# Patient Record
Sex: FEMALE | Race: BLACK OR AFRICAN AMERICAN | Employment: FULL TIME | URBAN - METROPOLITAN AREA
[De-identification: names, ages, dates, MRNs, and addresses within clinical notes are randomized per-mention and may not be internally consistent; named-entity substitution may affect disease eponyms.]

---

## 2017-05-08 ENCOUNTER — ANESTHESIA (OUTPATIENT)
Dept: ENDOSCOPY | Age: 32
End: 2017-05-08
Payer: COMMERCIAL

## 2017-05-08 ENCOUNTER — HOSPITAL ENCOUNTER (OUTPATIENT)
Age: 32
Setting detail: OUTPATIENT SURGERY
Discharge: HOME | End: 2017-05-08
Attending: SURGERY | Admitting: SURGERY
Payer: COMMERCIAL

## 2017-05-08 ENCOUNTER — ANESTHESIA EVENT (OUTPATIENT)
Dept: ENDOSCOPY | Age: 32
End: 2017-05-08
Payer: COMMERCIAL

## 2017-05-08 VITALS
HEART RATE: 78 BPM | TEMPERATURE: 97 F | RESPIRATION RATE: 14 BRPM | WEIGHT: 193 LBS | SYSTOLIC BLOOD PRESSURE: 124 MMHG | DIASTOLIC BLOOD PRESSURE: 70 MMHG | BODY MASS INDEX: 30.29 KG/M2 | HEIGHT: 67 IN | OXYGEN SATURATION: 99 %

## 2017-05-08 PROCEDURE — 88305 TISSUE EXAM BY PATHOLOGIST: CPT | Performed by: SURGERY

## 2017-05-08 PROCEDURE — 74011250636 HC RX REV CODE- 250/636

## 2017-05-08 PROCEDURE — 76060000032 HC ANESTHESIA 0.5 TO 1 HR: Performed by: SURGERY

## 2017-05-08 PROCEDURE — 74011000250 HC RX REV CODE- 250

## 2017-05-08 PROCEDURE — 74011250636 HC RX REV CODE- 250/636: Performed by: ANESTHESIOLOGY

## 2017-05-08 PROCEDURE — 77030009426 HC FCPS BIOP ENDOSC BSC -B: Performed by: SURGERY

## 2017-05-08 PROCEDURE — 76040000026: Performed by: SURGERY

## 2017-05-08 RX ORDER — HYDROMORPHONE HYDROCHLORIDE 2 MG/ML
0.5 INJECTION, SOLUTION INTRAMUSCULAR; INTRAVENOUS; SUBCUTANEOUS
Status: CANCELLED | OUTPATIENT
Start: 2017-05-08

## 2017-05-08 RX ORDER — ESMOLOL HYDROCHLORIDE 10 MG/ML
INJECTION INTRAVENOUS AS NEEDED
Status: DISCONTINUED | OUTPATIENT
Start: 2017-05-08 | End: 2017-05-08 | Stop reason: HOSPADM

## 2017-05-08 RX ORDER — PROPOFOL 10 MG/ML
INJECTION, EMULSION INTRAVENOUS
Status: DISCONTINUED | OUTPATIENT
Start: 2017-05-08 | End: 2017-05-08 | Stop reason: HOSPADM

## 2017-05-08 RX ORDER — LIDOCAINE HYDROCHLORIDE 20 MG/ML
INJECTION, SOLUTION EPIDURAL; INFILTRATION; INTRACAUDAL; PERINEURAL AS NEEDED
Status: DISCONTINUED | OUTPATIENT
Start: 2017-05-08 | End: 2017-05-08 | Stop reason: HOSPADM

## 2017-05-08 RX ORDER — MIDAZOLAM HYDROCHLORIDE 1 MG/ML
2 INJECTION, SOLUTION INTRAMUSCULAR; INTRAVENOUS ONCE
Status: COMPLETED | OUTPATIENT
Start: 2017-05-08 | End: 2017-05-08

## 2017-05-08 RX ORDER — OXYCODONE HYDROCHLORIDE 5 MG/1
5 TABLET ORAL
Status: CANCELLED | OUTPATIENT
Start: 2017-05-08

## 2017-05-08 RX ORDER — SODIUM CHLORIDE, SODIUM LACTATE, POTASSIUM CHLORIDE, CALCIUM CHLORIDE 600; 310; 30; 20 MG/100ML; MG/100ML; MG/100ML; MG/100ML
75 INJECTION, SOLUTION INTRAVENOUS CONTINUOUS
Status: DISCONTINUED | OUTPATIENT
Start: 2017-05-08 | End: 2017-05-08 | Stop reason: HOSPADM

## 2017-05-08 RX ORDER — SODIUM CHLORIDE 0.9 % (FLUSH) 0.9 %
5-10 SYRINGE (ML) INJECTION AS NEEDED
Status: CANCELLED | OUTPATIENT
Start: 2017-05-08

## 2017-05-08 RX ORDER — PROPOFOL 10 MG/ML
INJECTION, EMULSION INTRAVENOUS AS NEEDED
Status: DISCONTINUED | OUTPATIENT
Start: 2017-05-08 | End: 2017-05-08 | Stop reason: HOSPADM

## 2017-05-08 RX ORDER — OXYCODONE AND ACETAMINOPHEN 10; 325 MG/1; MG/1
1 TABLET ORAL AS NEEDED
Status: CANCELLED | OUTPATIENT
Start: 2017-05-08

## 2017-05-08 RX ADMIN — PROPOFOL 160 MCG/KG/MIN: 10 INJECTION, EMULSION INTRAVENOUS at 12:29

## 2017-05-08 RX ADMIN — PROPOFOL 50 MG: 10 INJECTION, EMULSION INTRAVENOUS at 12:29

## 2017-05-08 RX ADMIN — SODIUM CHLORIDE, SODIUM LACTATE, POTASSIUM CHLORIDE, AND CALCIUM CHLORIDE 75 ML/HR: 600; 310; 30; 20 INJECTION, SOLUTION INTRAVENOUS at 12:00

## 2017-05-08 RX ADMIN — PROPOFOL 50 MG: 10 INJECTION, EMULSION INTRAVENOUS at 12:35

## 2017-05-08 RX ADMIN — ESMOLOL HYDROCHLORIDE 30 MG: 10 INJECTION INTRAVENOUS at 12:44

## 2017-05-08 RX ADMIN — PROPOFOL 50 MG: 10 INJECTION, EMULSION INTRAVENOUS at 12:42

## 2017-05-08 RX ADMIN — LIDOCAINE HYDROCHLORIDE 50 MG: 20 INJECTION, SOLUTION EPIDURAL; INFILTRATION; INTRACAUDAL; PERINEURAL at 12:24

## 2017-05-08 RX ADMIN — MIDAZOLAM HYDROCHLORIDE 2 MG: 1 INJECTION, SOLUTION INTRAMUSCULAR; INTRAVENOUS at 12:06

## 2017-05-08 NOTE — ROUTINE PROCESS
Patient discharged via wheelchair. VSS on room air. Spoke with Dr. Job Brittle at bedside. Anesthesia aware of discharge. Discharge instructions given to patient and family.

## 2017-05-08 NOTE — PROGRESS NOTES
Pt reports 9/10 abdominal and rectal pain. Pt reports abd pain as cramping and rectal pain as a rip. Dr Potts Done contacted and made aware.  No orders received

## 2017-05-08 NOTE — DISCHARGE INSTRUCTIONS
Gastrointestinal Colonoscopy/Flexible Sigmoidoscopy - Lower Exam Discharge Instructions  1. Call Dr. Adriel Hernandez for any problems or questions. 2. Contact the doctors office for follow up appointment as directed  3. Medication may cause drowsiness for several hours, therefore, do not drive or operate machinery for remainder of the day. 4. No alcohol today. 5. Ordinarily, you may resume regular diet and activity after exam unless otherwise specified by your physician. 6. Because of air put into your colon during exam, you may experience some abdominal distension, relieved by the passage of gas, for several hours. 7. Contact your physician if you have any of the following:  a. Excessive amount of bleeding - large amount when having a bowel movement. Occasional specks of blood with bowel movement would not be unusual.  b. Severe abdominal pain  c. Fever or Chills    Any additional instructions:    Awaiting pathology report      Instructions given to Tamara Karime and other family members.   Instructions given by:  Luis Naidu

## 2017-05-08 NOTE — ANESTHESIA PREPROCEDURE EVALUATION
Anesthetic History   No history of anesthetic complications            Review of Systems / Medical History  Patient summary reviewed and pertinent labs reviewed    Pulmonary  Within defined limits                 Neuro/Psych   Within defined limits           Cardiovascular    Hypertension        Dysrhythmias : SVT      Exercise tolerance[de-identified] Relates marginal 4 mets only  Comments: S/p SVT ablation   GI/Hepatic/Renal  Within defined limits              Endo/Other  Within defined limits           Other Findings              Physical Exam    Airway  Mallampati: II  TM Distance: > 6 cm    Mouth opening: Normal     Cardiovascular    Rhythm: regular           Dental    Dentition: Lower braces and Upper braces     Pulmonary                 Abdominal  GI exam deferred       Other Findings            Anesthetic Plan    ASA: 2  Anesthesia type: total IV anesthesia          Induction: Intravenous  Anesthetic plan and risks discussed with: Patient

## 2017-05-08 NOTE — H&P (VIEW-ONLY)
Brionna Gonzalez MD, [facility], [address] [place] [address]  Cait Coats  Phone [phone]   Fax [phone]      Date of visit: 5/5/2017     Primary/Requesting provider: Xin Mc DO    Chief Complaint   Patient presents with    Rectal Bleeding          HISTORY OF PRESENT ILLNESS  Bridget Nielsen is a 27 y.o. female. HPI  Patient is a 27 y.o. female who presents for consideration of colonoscopy. She reports 1 month of symptoms including BRBPR, with antecedent lower abdominal/pelvic cramping which is not alleviated by the BM, typically lasting a few hours. She also noted \"ripping\" pain at the anus with bowel movements. She is not having diarrhea. Over this same time she has had significant anorexia, although symptoms are not directly exacerbated by eating, and has lost 17 pounds. She has been treated with antibiotics x 2 for presumed UTI due to symptom of urinary frequency; this seems to be controlled. She has not had fever, chills, nausea, vomiting. She has a strong family history of IBD. She reports rectal exam by PCP was (-), thus the concern about IBD. Medications:   Current Outpatient Prescriptions   Medication Sig    cyclobenzaprine (FLEXERIL) 10 mg tablet Take 1 Tab by mouth nightly. (Patient taking differently: Take 10 mg by mouth nightly as needed.)    losartan-hydroCHLOROthiazide (HYZAAR) 50-12.5 mg per tablet Take 1 Tab by mouth daily.  acetaminophen (TYLENOL EXTRA STRENGTH) 500 mg tablet Take 1,000 mg by mouth every eight (8) hours as needed for Pain.  ondansetron (ZOFRAN ODT) 4 mg disintegrating tablet Take 4 mg by mouth. No current facility-administered medications for this visit.          Allergies: No Known Allergies     Past History:  Past Medical History:   Diagnosis Date    Anemia     Chronic pain     back pain-- prn meds    Hypertension     manged with meds    Migraines     Palpitations     SV[facility]ra[facility] ([facility])      [facility] / ablation 2016      Past Surgical History:   Procedure Laterality Date    HX  SECTION      x 2    HX HERNIA REPAIR  10/2016    HX HYSTERECTOMY  10/2016    HX SVT ABLATION          Family and Social History:  Family History   Problem Relation Age of Onset    Ulcerative Colitis Mother      also Castleman's disease    No Known Problems Father     Crohn's Disease Sister      pt reports Crohn's and UC (?)    Crohn's Disease Maternal Grandmother     Colon Cancer Maternal Grandfather      Social History     Social History    Marital status:      Spouse name: N/A    Number of children: N/A    Years of education: N/A     Occupational History    Not on file. Social History Main Topics    Smoking status: Never Smoker    Smokeless tobacco: Never Used    Alcohol use Yes      Comment: Occ    Drug use: No    Sexual activity: Yes     Partners: Male     Birth control/ protection: None     Other Topics Concern    Not on file     Social History Narrative       Review of Systems   Constitutional: Positive for malaise/fatigue and weight loss. HENT: Negative. Eyes: Negative. Respiratory: Negative. Negative for cough, hemoptysis, sputum production and shortness of breath. Cardiovascular: Negative. Negative for chest pain, palpitations, orthopnea, claudication, leg swelling and PND. Gastrointestinal: Positive for abdominal pain, blood in stool, constipation, diarrhea, heartburn, nausea and vomiting. Genitourinary: Negative. Musculoskeletal: Negative. Skin: Negative. Neurological: Negative. Negative for headaches. Endo/Heme/Allergies: Negative. Psychiatric/Behavioral: Negative. Negative for depression, hallucinations, substance abuse and suicidal ideas. The patient is not nervous/anxious.         Physical Exam  Visit Vitals    /82    Pulse 81    Ht 5' 7\" (1.702 m)    Wt 193 lb (87.5 kg)    LMP 2013    BMI 30.23 kg/m2 General Appearance: In no acute distress   Ears/Nose/Mouth/Throat:   Hearing grossly normal.         Neck: Supple. Chest:   Lungs clear to auscultation bilaterally. Cardiovascular:  Regular rate and rhythm, S1, S2 normal, no murmur. Abdomen:   Soft. Rectal-negative per PCP; repeat deferred to indicated colonoscopy   Extremities: No gross deformity    Neuro: alert and oriented x 3              ASSESSMENT and PLAN  Encounter Diagnoses   Name Primary?  Rectal bleed Yes    Family history of Crohn's disease      Given symptoms and family history, and prior (-) anoscopy by PCP, colonoscopy is warranted. She is very concerned about a diagnosis of IBD. Will proceed expeditiously with Colonoscopy. Procedure and preparation reviewed. Risks reviewed including sedation risks, bleeding, perforation, incomplete exam, and missed pathology. Take prep the day prior to the procedure, as reviewed by the nurse. Call if any questions regarding the prep.

## 2017-05-08 NOTE — IP AVS SNAPSHOT
Ramses Quinn 
 
 
 [address] 
[phone] Patient: Nolvia Hamilton MRN: [id number] :1985 You are allergic to the following No active allergies Recent Documentation Height Weight Breastfeeding? BMI OB Status Smoking Status 1.702 m 87.5 kg No 30.23 kg/m2 Hysterectomy Never Smoker Emergency Contacts Name Discharge Info Relation Home Work Mobile Ariel Song DISCHARGE CAREGIVER [3] Spouse [3] [phone] CodyDeborah DISCHARGE CAREGIVER [3] Mother [14] [phone] About your hospitalization You were admitted on: May 8, 2017 You last received care in the:  [facility] You were discharged on: May 8, 2017 Unit phone number:  [phone] Why you were hospitalized Your primary diagnosis was:  Not on File Providers Seen During Your Hospitalizations Provider Role Specialty Primary office phone Janina Christensen MD Attending Provider General Surgery [phone] Your Primary Care Physician (PCP) Primary Care Physician Office Phone Office Fax Curt Davey [phone] Follow-up Information None Current Discharge Medication List  
  
ASK your doctor about these medications Dose & Instructions Dispensing Information Comments Morning Noon Evening Bedtime  
 cyclobenzaprine 10 mg tablet Commonly known as:  FLEXERIL Your last dose was: Your next dose is:    
   
   
 Dose:  10 mg Take 1 Tab by mouth nightly. Quantity:  30 Tab Refills:  0  
     
   
   
   
  
 losartan-hydroCHLOROthiazide 50-12.5 mg per tablet Commonly known as:  HYZAAR Your last dose was: Your next dose is:    
   
   
 Dose:  1 Tab Take 1 Tab by mouth daily. Quantity:  30 Tab Refills:  11  
     
   
   
   
  
 ondansetron 4 mg disintegrating tablet Commonly known as:  ZOFRAN ODT Your last dose was: Your next dose is:    
   
   
 Dose:  4 mg Take 4 mg by mouth. Refills:  0  
     
   
   
   
  
 TYLENOL EXTRA STRENGTH 500 mg tablet Generic drug:  acetaminophen Your last dose was: Your next dose is:    
   
   
 Dose:  1000 mg Take 1,000 mg by mouth every eight (8) hours as needed for Pain. Refills:  0 Discharge Instructions Gastrointestinal Colonoscopy/Flexible Sigmoidoscopy - Lower Exam Discharge Instructions 1. Call Dr. Padgett Monday for any problems or questions. 2. Contact the doctors office for follow up appointment as directed 3. Medication may cause drowsiness for several hours, therefore, do not drive or operate machinery for remainder of the day. 4. No alcohol today. 5. Ordinarily, you may resume regular diet and activity after exam unless otherwise specified by your physician. 6. Because of air put into your colon during exam, you may experience some abdominal distension, relieved by the passage of gas, for several hours. 7. Contact your physician if you have any of the following: 
a. Excessive amount of bleeding  large amount when having a bowel movement. Occasional specks of blood with bowel movement would not be unusual. 
b. Severe abdominal pain 
c. Fever or Chills Any additional instructions:   
Awaiting pathology report Instructions given to Ayanna Hall and other family members. Instructions given by:  Mora Graham Discharge Orders None Introducing [facility]! Dear Theresa Auguste: Thank you for requesting a [employer] account. Our records indicate that you already have an active [employer] account. You can access your account anytime at https://[employer]). [employer] Did you know that you can access your hospital and ER discharge instructions at any time in [employer]?   You can also review all of your test results from your hospital stay or ER visit. Additional Information If you have questions, please visit the Frequently Asked Questions section of the [employer] website at [email or web address] Remember, MyChart is NOT to be used for urgent needs. For medical emergencies, dial 911. Now available from your iPhone and Android! General Information Please provide this summary of care documentation to your next provider. Patient Signature:  ____________________________________________________________ Date:  ____________________________________________________________  
  
[facility] Provider Signature:  ____________________________________________________________ Date:  ____________________________________________________________

## 2017-05-08 NOTE — PROCEDURES
Procedure in Detail:  Informed consent was obtained for the procedure. The patient was placed in the left lateral decubitus position and sedation was induced by anesthesia. The [id number] was inserted into the rectum and advanced under direct vision to the terminal ileum. The quality of the colonic preparation was good. A careful inspection was made as the colonoscope was withdrawn, including a retroflexed view of the rectum; findings and interventions are described below. Appropriate photodocumentation was obtained. Findings:   Rectum:   - no fissue noted. minimal external hemorrhoids noted. normal anorectal mucosa  Sigmoid: Normal  Descending Colon: Normal  Transverse Colon: Normal  Ascending Colon: Normal  Cecum: Normal  Terminal Ileum: Normal    Specimens: random colon/TI biopsies obtained       Complications: None; patient tolerated the procedure well. \    EBL - minimal    Recommendations:   - Await pathology. - Follow up with primary care physician.      Signed By: Izzy Gracia MD                        May 8, 2017

## 2017-05-08 NOTE — INTERVAL H&P NOTE
H&P Update:  Elle Lockwood was seen and examined. History and physical has been reviewed. The patient has been examined.  There have been no significant clinical changes since the completion of the originally dated History and Physical.    Signed By: Radha Alba MD     May 8, 2017 12:07 PM

## 2017-05-08 NOTE — ANESTHESIA POSTPROCEDURE EVALUATION
Post-Anesthesia Evaluation and Assessment    Patient: Cody Garcia MRN: [id number]  SSN: [id number]    YOB: 1985  Age: 27 y.o. Sex: female       Cardiovascular Function/Vital Signs  Visit Vitals    BP (!) 161/92    Pulse 97    Temp 36.1 °C (97 °F)    Resp 12    Ht 5' 7\" (1.702 m)    Wt 87.5 kg (193 lb)    SpO2 96%    Breastfeeding No    BMI 30.23 kg/m2       Patient is status post total IV anesthesia anesthesia for Procedure(s):  COLONOSCOPY / POSS HEMORRHOID BANDING / BMI 30  COLON BIOPSY. Nausea/Vomiting: None    Postoperative hydration reviewed and adequate. Pain:  Pain Scale 1: Numeric (0 - 10) (05/08/17 1155)  Pain Intensity 1: 0 (05/08/17 1155)   Managed    Neurological Status: At baseline    Mental Status and Level of Consciousness: Arousable    Pulmonary Status:   O2 Device: Nasal cannula (05/08/17 1314)   Adequate oxygenation and airway patent    Complications related to anesthesia: None    Post-anesthesia assessment completed.  No concerns    Signed By: Vanessa Haji MD     May 8, 2017

## 2017-06-23 ENCOUNTER — HOSPITAL ENCOUNTER (OUTPATIENT)
Dept: LAB | Age: 32
Discharge: HOME | End: 2017-06-23
Payer: COMMERCIAL

## 2017-06-23 ENCOUNTER — HOSPITAL ENCOUNTER (OUTPATIENT)
Dept: GENERAL RADIOLOGY | Age: 32
Discharge: HOME | End: 2017-06-23
Payer: COMMERCIAL

## 2017-06-23 DIAGNOSIS — K58.2 MIXED IRRITABLE BOWEL SYNDROME: ICD-10-CM

## 2017-06-23 LAB
ALBUMIN SERPL BCP-MCNC: 3.8 G/DL (ref 3.5–5)
ALBUMIN/GLOB SERPL: 1 {RATIO} (ref 1.2–3.5)
ALP SERPL-CCNC: 86 U/L (ref 50–136)
ALT SERPL-CCNC: 17 U/L (ref 12–65)
AST SERPL W P-5'-P-CCNC: 9 U/L (ref 15–37)
BASOPHILS # BLD AUTO: 0.1 K/UL (ref 0–0.2)
BASOPHILS # BLD: 1 % (ref 0–2)
BILIRUB DIRECT SERPL-MCNC: 0.1 MG/DL
BILIRUB SERPL-MCNC: 0.4 MG/DL (ref 0.2–1.1)
CHOLEST SERPL-MCNC: 145 MG/DL
CRP SERPL-MCNC: 0.4 MG/DL (ref 0–0.9)
DIFFERENTIAL METHOD BLD: ABNORMAL
EOSINOPHIL # BLD: 0.2 K/UL (ref 0–0.8)
EOSINOPHIL NFR BLD: 4 % (ref 0.5–7.8)
ERYTHROCYTE [DISTWIDTH] IN BLOOD BY AUTOMATED COUNT: 13.3 % (ref 11.9–14.6)
GLOBULIN SER CALC-MCNC: 4 G/DL (ref 2.3–3.5)
HCT VFR BLD AUTO: 39.5 % (ref 35.8–46.3)
HDLC SERPL-MCNC: 43 MG/DL (ref 40–60)
HDLC SERPL: 3.4 {RATIO}
HGB BLD-MCNC: 13.7 G/DL (ref 11.7–15.4)
IMM GRANULOCYTES # BLD: 0 K/UL (ref 0–0.5)
IMM GRANULOCYTES NFR BLD AUTO: 0.2 % (ref 0–5)
LDLC SERPL CALC-MCNC: 89.6 MG/DL
LIPID PROFILE,FLP: NORMAL
LYMPHOCYTES # BLD AUTO: 51 % (ref 13–44)
LYMPHOCYTES # BLD: 2.6 K/UL (ref 0.5–4.6)
MCH RBC QN AUTO: 28.3 PG (ref 26.1–32.9)
MCHC RBC AUTO-ENTMCNC: 34.7 G/DL (ref 31.4–35)
MCV RBC AUTO: 81.6 FL (ref 79.6–97.8)
MONOCYTES # BLD: 0.4 K/UL (ref 0.1–1.3)
MONOCYTES NFR BLD AUTO: 8 % (ref 4–12)
NEUTS SEG # BLD: 1.9 K/UL (ref 1.7–8.2)
NEUTS SEG NFR BLD AUTO: 36 % (ref 43–78)
PLATELET # BLD AUTO: 269 K/UL (ref 150–450)
PMV BLD AUTO: 9.6 FL (ref 10.8–14.1)
PROT SERPL-MCNC: 7.8 G/DL (ref 6.3–8.2)
RBC # BLD AUTO: 4.84 M/UL (ref 4.05–5.25)
TRIGL SERPL-MCNC: 62 MG/DL (ref 35–150)
TSH SERPL DL<=0.005 MIU/L-ACNC: 0.65 UIU/ML (ref 0.36–3.74)
VLDLC SERPL CALC-MCNC: 12.4 MG/DL (ref 6–23)
WBC # BLD AUTO: 5.1 K/UL (ref 4.3–11.1)

## 2017-06-23 PROCEDURE — 80076 HEPATIC FUNCTION PANEL: CPT | Performed by: NURSE PRACTITIONER

## 2017-06-23 PROCEDURE — 74000 XR ABD (KUB): CPT

## 2017-06-23 PROCEDURE — 84443 ASSAY THYROID STIM HORMONE: CPT | Performed by: NURSE PRACTITIONER

## 2017-06-23 PROCEDURE — 80061 LIPID PANEL: CPT | Performed by: NURSE PRACTITIONER

## 2017-06-23 PROCEDURE — 85025 COMPLETE CBC W/AUTO DIFF WBC: CPT | Performed by: NURSE PRACTITIONER

## 2017-06-23 PROCEDURE — 36415 COLL VENOUS BLD VENIPUNCTURE: CPT | Performed by: NURSE PRACTITIONER

## 2017-06-23 PROCEDURE — 86140 C-REACTIVE PROTEIN: CPT | Performed by: NURSE PRACTITIONER

## 2017-09-19 ENCOUNTER — HOSPITAL ENCOUNTER (OUTPATIENT)
Dept: GENERAL RADIOLOGY | Age: 32
Discharge: HOME | End: 2017-09-19
Attending: NURSE PRACTITIONER
Payer: COMMERCIAL

## 2017-09-19 DIAGNOSIS — R19.8 ALTERNATING CONSTIPATION AND DIARRHEA: ICD-10-CM

## 2017-09-19 DIAGNOSIS — R10.31 RLQ ABDOMINAL PAIN: ICD-10-CM

## 2017-09-19 PROCEDURE — 74011000255 HC RX REV CODE- 255: Performed by: NURSE PRACTITIONER

## 2017-09-19 PROCEDURE — 74250 X-RAY XM SM INT 1CNTRST STD: CPT

## 2017-09-19 RX ADMIN — BARIUM SULFATE 600 ML: 240 SUSPENSION ORAL at 08:37

## 2017-10-24 ENCOUNTER — HOSPITAL ENCOUNTER (OUTPATIENT)
Dept: LAB | Age: 32
Discharge: HOME | End: 2017-10-24

## 2017-10-24 PROCEDURE — 88312 SPECIAL STAINS GROUP 1: CPT | Performed by: INTERNAL MEDICINE

## 2017-10-24 PROCEDURE — 88305 TISSUE EXAM BY PATHOLOGIST: CPT | Performed by: INTERNAL MEDICINE

## 2017-12-16 ENCOUNTER — HOSPITAL ENCOUNTER (EMERGENCY)
Age: 32
Discharge: HOME | End: 2017-12-16
Attending: EMERGENCY MEDICINE
Payer: COMMERCIAL

## 2017-12-16 ENCOUNTER — APPOINTMENT (OUTPATIENT)
Dept: CT IMAGING | Age: 32
End: 2017-12-16
Attending: EMERGENCY MEDICINE
Payer: COMMERCIAL

## 2017-12-16 VITALS
BODY MASS INDEX: 31.39 KG/M2 | HEART RATE: 73 BPM | WEIGHT: 200 LBS | DIASTOLIC BLOOD PRESSURE: 91 MMHG | RESPIRATION RATE: 16 BRPM | HEIGHT: 67 IN | TEMPERATURE: 98.4 F | OXYGEN SATURATION: 100 % | SYSTOLIC BLOOD PRESSURE: 176 MMHG

## 2017-12-16 DIAGNOSIS — R51.9 NONINTRACTABLE HEADACHE, UNSPECIFIED CHRONICITY PATTERN, UNSPECIFIED HEADACHE TYPE: Primary | ICD-10-CM

## 2017-12-16 LAB
ANION GAP SERPL CALC-SCNC: 7 MMOL/L (ref 7–16)
BASOPHILS # BLD: 0.1 K/UL (ref 0–0.2)
BASOPHILS NFR BLD: 1 % (ref 0–2)
BUN SERPL-MCNC: 11 MG/DL (ref 6–23)
CALCIUM SERPL-MCNC: 9 MG/DL (ref 8.3–10.4)
CHLORIDE SERPL-SCNC: 103 MMOL/L (ref 98–107)
CO2 SERPL-SCNC: 29 MMOL/L (ref 21–32)
CREAT SERPL-MCNC: 0.82 MG/DL (ref 0.6–1)
DIFFERENTIAL METHOD BLD: ABNORMAL
EOSINOPHIL # BLD: 0.1 K/UL (ref 0–0.8)
EOSINOPHIL NFR BLD: 1 % (ref 0.5–7.8)
ERYTHROCYTE [DISTWIDTH] IN BLOOD BY AUTOMATED COUNT: 12.4 % (ref 11.9–14.6)
GLUCOSE SERPL-MCNC: 97 MG/DL (ref 65–100)
HCT VFR BLD AUTO: 41.4 % (ref 35.8–46.3)
HGB BLD-MCNC: 14.1 G/DL (ref 11.7–15.4)
IMM GRANULOCYTES # BLD: 0 K/UL (ref 0–0.5)
IMM GRANULOCYTES NFR BLD AUTO: 0 % (ref 0–5)
LYMPHOCYTES # BLD: 3.2 K/UL (ref 0.5–4.6)
LYMPHOCYTES NFR BLD: 41 % (ref 13–44)
MCH RBC QN AUTO: 29.1 PG (ref 26.1–32.9)
MCHC RBC AUTO-ENTMCNC: 34.1 G/DL (ref 31.4–35)
MCV RBC AUTO: 85.5 FL (ref 79.6–97.8)
MONOCYTES # BLD: 0.6 K/UL (ref 0.1–1.3)
MONOCYTES NFR BLD: 7 % (ref 4–12)
NEUTS SEG # BLD: 3.9 K/UL (ref 1.7–8.2)
NEUTS SEG NFR BLD: 50 % (ref 43–78)
PLATELET # BLD AUTO: 302 K/UL (ref 150–450)
PMV BLD AUTO: 10.6 FL (ref 10.8–14.1)
POTASSIUM SERPL-SCNC: 4.2 MMOL/L (ref 3.5–5.1)
RBC # BLD AUTO: 4.84 M/UL (ref 4.05–5.25)
SODIUM SERPL-SCNC: 139 MMOL/L (ref 136–145)
WBC # BLD AUTO: 7.9 K/UL (ref 4.3–11.1)

## 2017-12-16 PROCEDURE — 85025 COMPLETE CBC W/AUTO DIFF WBC: CPT | Performed by: EMERGENCY MEDICINE

## 2017-12-16 PROCEDURE — 96361 HYDRATE IV INFUSION ADD-ON: CPT | Performed by: EMERGENCY MEDICINE

## 2017-12-16 PROCEDURE — 70450 CT HEAD/BRAIN W/O DYE: CPT

## 2017-12-16 PROCEDURE — 96374 THER/PROPH/DIAG INJ IV PUSH: CPT | Performed by: EMERGENCY MEDICINE

## 2017-12-16 PROCEDURE — 99283 EMERGENCY DEPT VISIT LOW MDM: CPT | Performed by: EMERGENCY MEDICINE

## 2017-12-16 PROCEDURE — 74011250636 HC RX REV CODE- 250/636: Performed by: EMERGENCY MEDICINE

## 2017-12-16 PROCEDURE — 80048 BASIC METABOLIC PNL TOTAL CA: CPT | Performed by: EMERGENCY MEDICINE

## 2017-12-16 PROCEDURE — 96375 TX/PRO/DX INJ NEW DRUG ADDON: CPT | Performed by: EMERGENCY MEDICINE

## 2017-12-16 RX ORDER — LINACLOTIDE 290 UG/1
290 CAPSULE, GELATIN COATED ORAL
COMMUNITY

## 2017-12-16 RX ORDER — PROMETHAZINE HYDROCHLORIDE 25 MG/1
25 SUPPOSITORY RECTAL
Qty: 12 SUPPOSITORY | Refills: 0 | Status: SHIPPED | OUTPATIENT
Start: 2017-12-16 | End: 2017-12-23

## 2017-12-16 RX ORDER — PROCHLORPERAZINE EDISYLATE 5 MG/ML
10 INJECTION INTRAMUSCULAR; INTRAVENOUS
Status: COMPLETED | OUTPATIENT
Start: 2017-12-16 | End: 2017-12-16

## 2017-12-16 RX ORDER — KETOROLAC TROMETHAMINE 30 MG/ML
30 INJECTION, SOLUTION INTRAMUSCULAR; INTRAVENOUS
Status: COMPLETED | OUTPATIENT
Start: 2017-12-16 | End: 2017-12-16

## 2017-12-16 RX ORDER — DIPHENHYDRAMINE HYDROCHLORIDE 50 MG/ML
12.5 INJECTION, SOLUTION INTRAMUSCULAR; INTRAVENOUS
Status: COMPLETED | OUTPATIENT
Start: 2017-12-16 | End: 2017-12-16

## 2017-12-16 RX ORDER — DEXAMETHASONE SODIUM PHOSPHATE 100 MG/10ML
10 INJECTION INTRAMUSCULAR; INTRAVENOUS
Status: COMPLETED | OUTPATIENT
Start: 2017-12-16 | End: 2017-12-16

## 2017-12-16 RX ADMIN — SODIUM CHLORIDE 1000 ML: 900 INJECTION, SOLUTION INTRAVENOUS at 20:26

## 2017-12-16 RX ADMIN — DEXAMETHASONE SODIUM PHOSPHATE 10 MG: 10 INJECTION INTRAMUSCULAR; INTRAVENOUS at 20:30

## 2017-12-16 RX ADMIN — DIPHENHYDRAMINE HYDROCHLORIDE 12.5 MG: 50 INJECTION, SOLUTION INTRAMUSCULAR; INTRAVENOUS at 20:30

## 2017-12-16 RX ADMIN — PROCHLORPERAZINE EDISYLATE 10 MG: 5 INJECTION INTRAMUSCULAR; INTRAVENOUS at 20:30

## 2017-12-16 RX ADMIN — KETOROLAC TROMETHAMINE 30 MG: 30 INJECTION, SOLUTION INTRAMUSCULAR at 20:29

## 2017-12-17 NOTE — DISCHARGE INSTRUCTIONS

## 2017-12-17 NOTE — ED PROVIDER NOTES
HPI Comments: Since Monday has headache mainly on right side. No fever or stiff neck. No focal motor or sensory changes. Denied migraines (flow chart has this is a baseline diagnosis). Denies any vision change. Denies any speech change. No weakness or numbness or balance/coordination issues. She has not had headaches similar to this in the past.  Has had nausea with vomiting with this. Patient is a 34 y.o. female presenting with headaches. The history is provided by the patient. Headache    This is a new problem. Episode onset: monday. The problem occurs constantly. The problem has been gradually worsening. The headache is aggravated by an unknown factor. The pain is located in the right unilateral region. The pain is at a severity of 10/10. The pain is severe. Associated symptoms include nausea. Pertinent negatives include no fever, no near-syncope, no weakness, no visual change and no vomiting.         Past Medical History:   Diagnosis Date    Anemia     Arrhythmia 10/2015    Supraventricular tachycardia    Chronic pain     back pain-- prn meds    Hypertension     manged with meds    IBS (irritable bowel syndrome)     Kidney stones     Meniere's disease     Migraine     Migraines     Palpitations     PONV (postoperative nausea and vomiting)     Pulmonary embolus (HCC)     [facility])     Dr Jose Javier Cardiology / ablation 2016    Varicella        Past Surgical History:   Procedure Laterality Date    COLONOSCOPY N/A 5/8/2017    COLONOSCOPY BMI 30 performed by Shlomo Duffy MD at [address]      x 2    HX HERNIA REPAIR  10/2016    HX HERNIA REPAIR  2009    Abdominal    HX HYSTERECTOMY  10/2016    HX MYOMECTOMY      HX ROTATOR CUFF REPAIR Right 2014    HX SVT ABLATION  2016    HX SVT ABLATION  02/15/2016    HX TUBAL LIGATION  2015    HI COLONOSCOPY W/BIOPSY SINGLE/MULTIPLE  5/8/2017              Family History: Problem Relation Age of Onset    Ulcerative Colitis Mother      also Castleman's disease    No Known Problems Father     Crohn's Disease Sister      pt reports Crohn's and UC (?)    Crohn's Disease Maternal Grandmother     Colon Cancer Maternal Grandfather        Social History     Social History    Marital status:      Spouse name: N/A    Number of children: N/A    Years of education: N/A     Occupational History    Not on file. Social History Main Topics    Smoking status: Never Smoker    Smokeless tobacco: Never Used    Alcohol use Yes      Comment: Occ    Drug use: No    Sexual activity: Yes     Partners: Male     Birth control/ protection: None     Other Topics Concern    Not on file     Social History Narrative         ALLERGIES: Review of patient's allergies indicates no known allergies. Review of Systems   Constitutional: Negative for fever. HENT: Negative for congestion, dental problem, ear discharge, ear pain, facial swelling, sinus pain and sinus pressure. Cardiovascular: Negative. Negative for near-syncope. Gastrointestinal: Positive for nausea. Negative for vomiting. Neurological: Positive for headaches. Negative for facial asymmetry, weakness and numbness. Vitals:    12/16/17 1857   BP: 183/82   Pulse: 78   Resp: 18   Temp: 98.4 °F (36.9 °C)   SpO2: 100%   Weight: 90.7 kg (200 lb)   Height: 5' 7\" (1.702 m)            Physical Exam   Constitutional: She appears well-developed and well-nourished. No distress. HENT:   Head: Atraumatic. Right Ear: External ear normal.   Left Ear: External ear normal.   Mouth/Throat: Oropharynx is clear and moist.   Eyes: Conjunctivae and EOM are normal. Pupils are equal, round, and reactive to light. No scleral icterus. Neck: Neck supple. No spinous process tenderness and no muscular tenderness present. Cardiovascular: Normal rate and intact distal pulses. Exam reveals no friction rub.     No murmur heard.  Pulmonary/Chest: Effort normal. No respiratory distress. Abdominal: Soft. Musculoskeletal: Normal range of motion. She exhibits no edema or deformity. Neurological: She is alert. Coordination normal.   Skin: Skin is warm and dry. Psychiatric: Her behavior is normal. Thought content normal.   Nursing note and vitals reviewed. MDM  Number of Diagnoses or Management Options  Nonintractable headache, unspecified chronicity pattern, unspecified headache type:   Diagnosis management comments: Persistent headache with right sided pain. No deficits with this. No fever or infectious changes. Really does not seem too volume depleted .        Amount and/or Complexity of Data Reviewed  Tests in the radiology section of CPT®: reviewed and ordered  Independent visualization of images, tracings, or specimens: yes    Risk of Complications, Morbidity, and/or Mortality  Presenting problems: high  Diagnostic procedures: low  Management options: moderate    Patient Progress  Patient progress: improved (Left before all meds given)    ED Course       Procedures

## 2017-12-17 NOTE — ED NOTES
I have reviewed discharge instructions with the patient. The patient verbalized understanding. Patient left ED via Discharge Method: ambulatory to Home with ( family, self). Opportunity for questions and clarification provided. Patient given 1 scripts. To continue your aftercare when you leave the hospital, you may receive an automated call from our care team to check in on how you are doing. This is a free service and part of our promise to provide the best care and service to meet your aftercare needs.  If you have questions, or wish to unsubscribe from this service please call [phone]. Thank you for Choosing our [facility] [employer] [facility] Emergency Department.

## 2018-01-31 PROCEDURE — 99284 EMERGENCY DEPT VISIT MOD MDM: CPT | Performed by: EMERGENCY MEDICINE

## 2018-01-31 PROCEDURE — 51701 INSERT BLADDER CATHETER: CPT | Performed by: EMERGENCY MEDICINE

## 2018-01-31 PROCEDURE — 96374 THER/PROPH/DIAG INJ IV PUSH: CPT | Performed by: EMERGENCY MEDICINE

## 2018-01-31 RX ORDER — HYOSCYAMINE SULFATE 0.12 MG/1
0.25 TABLET SUBLINGUAL
COMMUNITY
End: 2018-04-13 | Stop reason: SDUPTHER

## 2018-02-01 ENCOUNTER — HOSPITAL ENCOUNTER (EMERGENCY)
Age: 33
Discharge: HOME | End: 2018-02-01
Attending: EMERGENCY MEDICINE
Payer: COMMERCIAL

## 2018-02-01 VITALS
WEIGHT: 198 LBS | BODY MASS INDEX: 31.08 KG/M2 | HEIGHT: 67 IN | RESPIRATION RATE: 16 BRPM | OXYGEN SATURATION: 100 % | DIASTOLIC BLOOD PRESSURE: 89 MMHG | HEART RATE: 103 BPM | TEMPERATURE: 98.1 F | SYSTOLIC BLOOD PRESSURE: 154 MMHG

## 2018-02-01 DIAGNOSIS — R10.9 ACUTE ABDOMINAL PAIN: ICD-10-CM

## 2018-02-01 DIAGNOSIS — N93.9 VAGINAL BLEEDING: Primary | ICD-10-CM

## 2018-02-01 LAB
ANION GAP SERPL CALC-SCNC: 11 MMOL/L (ref 7–16)
BASOPHILS # BLD: 0.1 K/UL (ref 0–0.2)
BASOPHILS NFR BLD: 1 % (ref 0–2)
BUN SERPL-MCNC: 14 MG/DL (ref 6–23)
CALCIUM SERPL-MCNC: 9.2 MG/DL (ref 8.3–10.4)
CHLORIDE SERPL-SCNC: 102 MMOL/L (ref 98–107)
CO2 SERPL-SCNC: 29 MMOL/L (ref 21–32)
CREAT SERPL-MCNC: 0.9 MG/DL (ref 0.6–1)
DIFFERENTIAL METHOD BLD: ABNORMAL
EOSINOPHIL # BLD: 0.2 K/UL (ref 0–0.8)
EOSINOPHIL NFR BLD: 2 % (ref 0.5–7.8)
ERYTHROCYTE [DISTWIDTH] IN BLOOD BY AUTOMATED COUNT: 12.6 % (ref 11.9–14.6)
GLUCOSE SERPL-MCNC: 83 MG/DL (ref 65–100)
HCT VFR BLD AUTO: 40 % (ref 35.8–46.3)
HGB BLD-MCNC: 13.3 G/DL (ref 11.7–15.4)
IMM GRANULOCYTES # BLD: 0 K/UL (ref 0–0.5)
IMM GRANULOCYTES NFR BLD AUTO: 0 % (ref 0–5)
LYMPHOCYTES # BLD: 3.6 K/UL (ref 0.5–4.6)
LYMPHOCYTES NFR BLD: 44 % (ref 13–44)
MCH RBC QN AUTO: 28.4 PG (ref 26.1–32.9)
MCHC RBC AUTO-ENTMCNC: 33.3 G/DL (ref 31.4–35)
MCV RBC AUTO: 85.3 FL (ref 79.6–97.8)
MONOCYTES # BLD: 0.7 K/UL (ref 0.1–1.3)
MONOCYTES NFR BLD: 9 % (ref 4–12)
NEUTS SEG # BLD: 3.6 K/UL (ref 1.7–8.2)
NEUTS SEG NFR BLD: 44 % (ref 43–78)
PLATELET # BLD AUTO: 279 K/UL (ref 150–450)
PMV BLD AUTO: 9.9 FL (ref 10.8–14.1)
POTASSIUM SERPL-SCNC: 3.2 MMOL/L (ref 3.5–5.1)
RBC # BLD AUTO: 4.69 M/UL (ref 4.05–5.25)
SODIUM SERPL-SCNC: 142 MMOL/L (ref 136–145)
WBC # BLD AUTO: 8.1 K/UL (ref 4.3–11.1)

## 2018-02-01 PROCEDURE — 80048 BASIC METABOLIC PNL TOTAL CA: CPT | Performed by: EMERGENCY MEDICINE

## 2018-02-01 PROCEDURE — 74011250636 HC RX REV CODE- 250/636: Performed by: EMERGENCY MEDICINE

## 2018-02-01 PROCEDURE — 85025 COMPLETE CBC W/AUTO DIFF WBC: CPT | Performed by: EMERGENCY MEDICINE

## 2018-02-01 PROCEDURE — 77030011943

## 2018-02-01 RX ORDER — TRAMADOL HYDROCHLORIDE 50 MG/1
50 TABLET ORAL
Qty: 15 TAB | Refills: 0 | Status: SHIPPED | OUTPATIENT
Start: 2018-02-01 | End: 2018-05-08 | Stop reason: ALTCHOICE

## 2018-02-01 RX ORDER — SODIUM CHLORIDE 0.9 % (FLUSH) 0.9 %
5-10 SYRINGE (ML) INJECTION EVERY 8 HOURS
Status: DISCONTINUED | OUTPATIENT
Start: 2018-02-01 | End: 2018-02-01 | Stop reason: HOSPADM

## 2018-02-01 RX ORDER — KETOROLAC TROMETHAMINE 30 MG/ML
30 INJECTION, SOLUTION INTRAMUSCULAR; INTRAVENOUS
Status: COMPLETED | OUTPATIENT
Start: 2018-02-01 | End: 2018-02-01

## 2018-02-01 RX ORDER — SODIUM CHLORIDE 0.9 % (FLUSH) 0.9 %
5-10 SYRINGE (ML) INJECTION AS NEEDED
Status: DISCONTINUED | OUTPATIENT
Start: 2018-02-01 | End: 2018-02-01 | Stop reason: HOSPADM

## 2018-02-01 RX ADMIN — KETOROLAC TROMETHAMINE 30 MG: 30 INJECTION, SOLUTION INTRAMUSCULAR at 03:21

## 2018-02-01 NOTE — DISCHARGE INSTRUCTIONS
Abdominal Pain: Care Instructions  Your Care Instructions    Abdominal pain has many possible causes. Some aren't serious and get better on their own in a few days. Others need more testing and treatment. If your pain continues or gets worse, you need to be rechecked and may need more tests to find out what is wrong. You may need surgery to correct the problem. Don't ignore new symptoms, such as fever, nausea and vomiting, urination problems, pain that gets worse, and dizziness. These may be signs of a more serious problem. Your doctor may have recommended a follow-up visit in the next 8 to 12 hours. If you are not getting better, you may need more tests or treatment. The doctor has checked you carefully, but problems can develop later. If you notice any problems or new symptoms, get medical treatment right away. Follow-up care is a key part of your treatment and safety. Be sure to make and go to all appointments, and call your doctor if you are having problems. It's also a good idea to know your test results and keep a list of the medicines you take. How can you care for yourself at home? · Rest until you feel better. · To prevent dehydration, drink plenty of fluids, enough so that your urine is light yellow or clear like water. Choose water and other caffeine-free clear liquids until you feel better. If you have kidney, heart, or liver disease and have to limit fluids, talk with your doctor before you increase the amount of fluids you drink. · If your stomach is upset, eat mild foods, such as rice, dry toast or crackers, bananas, and applesauce. Try eating several small meals instead of two or three large ones. · Wait until 48 hours after all symptoms have gone away before you have spicy foods, alcohol, and drinks that contain caffeine. · Do not eat foods that are high in fat. · Avoid anti-inflammatory medicines such as aspirin, ibuprofen (Advil, Motrin), and naproxen (Aleve).  These can cause stomach upset. Talk to your doctor if you take daily aspirin for another health problem. When should you call for help? Call 911 anytime you think you may need emergency care. For example, call if:  ? · You passed out (lost consciousness). ? · You pass maroon or very bloody stools. ? · You vomit blood or what looks like coffee grounds. ? · You have new, severe belly pain. ?Call your doctor now or seek immediate medical care if:  ? · Your pain gets worse, especially if it becomes focused in one area of your belly. ? · You have a new or higher fever. ? · Your stools are black and look like tar, or they have streaks of blood. ? · You have unexpected vaginal bleeding. ? · You have symptoms of a urinary tract infection. These may include:  ¨ Pain when you urinate. ¨ Urinating more often than usual.  ¨ Blood in your urine. ? · You are dizzy or lightheaded, or you feel like you may faint. ? Watch closely for changes in your health, and be sure to contact your doctor if:  ? · You are not getting better after 1 day (24 hours). Where can you learn more? Go to http://joeTruly Wirelessaneesh.info/. Enter O107 in the search box to learn more about \"Abdominal Pain: Care Instructions. \"  Current as of: March 20, 2017  Content Version: 11.4  © 8188-8949 [employer]. Care instructions adapted under license by [employer] (which disclaims liability or warranty for this information). If you have questions about a medical condition or this instruction, always ask your healthcare professional. [place] [address] any warranty or liability for your use of this information. Vaginal Bleeding in Nonpregnant Women: Care Instructions  Your Care Instructions    Many women have bleeding or spotting between periods. Lots of things can cause it. You may bleed because of hormone problems, stress, or ovulation.  Fibroids and IUDs (intrauterine devices) can also cause bleeding. If your bleeding or spotting is caused by one of these things and is not heavy or doesn't happen often, you probably don't need to worry. But in rare cases, infection, cancer, or other serious conditions can cause bleeding. So you may need more tests to find the cause of your bleeding. The doctor has checked you carefully, but problems can develop later. If you notice any problems or new symptoms, get medical treatment right away. Follow-up care is a key part of your treatment and safety. Be sure to make and go to all appointments, and call your doctor if you are having problems. It's also a good idea to know your test results and keep a list of the medicines you take. How can you care for yourself at home? · Take pain medicines exactly as directed. ¨ If the doctor gave you a prescription medicine for pain, take it as prescribed. ¨ If you are not taking a prescription pain medicine, ask your doctor if you can take an over-the-counter medicine. Do not take aspirin, which may make bleeding worse. · If your doctor prescribed birth control pills for your bleeding, take them as directed. · Eat foods that are high in iron and vitamin C. Foods high in iron include red meat, shellfish, eggs, beans, and leafy green vegetables. Foods high in vitamin C include citrus fruits, tomatoes, and broccoli. Ask your doctor if you need to take iron pills or a multivitamin. · Ask your doctor when it is okay to have sex. When should you call for help? Call 911 anytime you think you may need emergency care. For example, call if:  ? · You passed out (lost consciousness). ?Call your doctor now or seek immediate medical care if:  ? · You have severe vaginal bleeding. ? · You are dizzy or lightheaded, or you feel like you may faint. ? · You have new or worse belly or pelvic pain. ? Watch closely for changes in your health, and be sure to contact your doctor if:  ? · Your bleeding gets worse.    ? · You think you might be pregnant. ? · You do not get better as expected. Where can you learn more? Go to http://joe-aneesh.info/. Enter R804 in the search box to learn more about \"Vaginal Bleeding in Nonpregnant Women: Care Instructions. \"  Current as of: October 13, 2016  Content Version: 11.4  © 5336-2671 [employer]. Care instructions adapted under license by [employer] (which disclaims liability or warranty for this information). If you have questions about a medical condition or this instruction, always ask your healthcare professional. [address] any warranty or liability for your use of this information.

## 2018-02-01 NOTE — ED NOTES
I have reviewed discharge instructions with the patient. The patient verbalized understanding. Patient left ED via Discharge Method: ambulatory to Home with self. Opportunity for questions and clarification provided. Patient given 1 script. To continue your aftercare when you leave the hospital, you may receive an automated call from our care team to check in on how you are doing. This is a free service and part of our promise to provide the best care and service to meet your aftercare needs.  If you have questions, or wish to unsubscribe from this service please call [phone]. Thank you for Choosing our [facility] Emergency Department.

## 2018-02-01 NOTE — ED PROVIDER NOTES
Patient is a 34 y.o. female presenting with vaginal bleeding. The history is provided by the patient. Vaginal Bleeding   This is a new problem. The current episode started yesterday. The problem occurs daily. The problem has not changed since onset. Associated symptoms include abdominal pain (chronic suprapubic over the last couple of months, given Hycotuss and mean by her gynecologist.  Patient had a hysterectomy approximately a year ago for fibroids. ). Pertinent negatives include no chest pain, no headaches and no shortness of breath. Nothing aggravates the symptoms. Nothing relieves the symptoms. She has tried nothing for the symptoms. The treatment provided no relief.         Past Medical History:   Diagnosis Date    Anemia     Arrhythmia 10/2015    Supraventricular tachycardia    Chronic pain     back pain-- prn meds    Hypertension     manged with meds    IBS (irritable bowel syndrome)     Kidney stones     Meniere's disease     Migraine     Migraines     Palpitations     PONV (postoperative nausea and vomiting)     Pulmonary embolus (HCC)     SVT (supraventricular tachycardia) [facility])     Dr Consuelo Mendez Cardiology / ablation 2016    Varicella        Past Surgical History:   Procedure Laterality Date    COLONOSCOPY N/A 5/8/2017    COLONOSCOPY BMI 30 performed by Destinee Daniel MD at [address]      x 2    HX HERNIA REPAIR  10/2016    HX HERNIA REPAIR  2009    Abdominal    HX HYSTERECTOMY  10/2016    HX MYOMECTOMY      HX ROTATOR CUFF REPAIR Right 2014    HX SVT ABLATION  2016    HX SVT ABLATION  02/15/2016    HX TUBAL LIGATION  2015    TN COLONOSCOPY W/BIOPSY SINGLE/MULTIPLE  5/8/2017              Family History:   Problem Relation Age of Onset    Ulcerative Colitis Mother      also Castleman's disease    No Known Problems Father     Crohn's Disease Sister      pt reports Crohn's and UC (?)    Crohn's Disease Maternal Grandmother     Colon Cancer Maternal Grandfather        Social History     Social History    Marital status:      Spouse name: N/A    Number of children: N/A    Years of education: N/A     Occupational History    Not on file. Social History Main Topics    Smoking status: Never Smoker    Smokeless tobacco: Never Used    Alcohol use Yes      Comment: Occ    Drug use: No    Sexual activity: Yes     Partners: Male     Birth control/ protection: None     Other Topics Concern    Not on file     Social History Narrative         ALLERGIES: Review of patient's allergies indicates no known allergies. Review of Systems   Constitutional: Negative for chills and fever. Respiratory: Negative for shortness of breath. Cardiovascular: Negative for chest pain. Gastrointestinal: Positive for abdominal pain (chronic suprapubic over the last couple of months, given Hycotuss and mean by her gynecologist.  Patient had a hysterectomy approximately a year ago for fibroids. ). Negative for constipation, diarrhea, nausea and vomiting. Genitourinary: Positive for vaginal bleeding. Neurological: Negative for headaches. All other systems reviewed and are negative. Vitals:    01/31/18 2259 01/31/18 2301   BP:  142/80   Pulse: (!) 103    Resp: 16    Temp: 98.1 °F (36.7 °C)    SpO2: 99%    Weight: 89.8 kg (198 lb)    Height: 5' 7\" (1.702 m)             Physical Exam   Constitutional: She is oriented to person, place, and time. She appears well-developed and well-nourished. No distress. HENT:   Head: Normocephalic and atraumatic. Right Ear: Tympanic membrane and external ear normal.   Left Ear: Tympanic membrane and external ear normal.   Mouth/Throat: Oropharynx is clear and moist.   Eyes: Conjunctivae and EOM are normal. Pupils are equal, round, and reactive to light. Neck: Normal range of motion. Neck supple. No tracheal deviation present.    Cardiovascular: Normal rate, regular rhythm, normal heart sounds and intact distal pulses. Exam reveals no gallop and no friction rub. No murmur heard. Pulmonary/Chest: Effort normal and breath sounds normal. No respiratory distress. She has no wheezes. Abdominal: Soft. Bowel sounds are normal. She exhibits no shifting dullness, no distension, no pulsatile liver, no fluid wave, no abdominal bruit, no pulsatile midline mass and no mass. There is no hepatosplenomegaly. There is tenderness in the suprapubic area. There is no rigidity, no rebound, no guarding, no CVA tenderness, no tenderness at McBurney's point and negative Barragan's sign. No hernia. Genitourinary: There is tenderness and bleeding (small amount of dark red blood in the vaginal vault, no active bleeding noted. Patient is very tender) in the vagina. No erythema in the vagina. No foreign body in the vagina. No signs of injury around the vagina. No vaginal discharge found. Musculoskeletal: Normal range of motion. She exhibits no edema. Lymphadenopathy:     She has no cervical adenopathy. Neurological: She is alert and oriented to person, place, and time. She displays normal reflexes. No cranial nerve deficit. Skin: Skin is warm and dry. No rash noted. She is not diaphoretic. No erythema. Psychiatric: She has a normal mood and affect. Nursing note and vitals reviewed. MDM  Number of Diagnoses or Management Options  Acute abdominal pain: new and requires workup  Vaginal bleeding: new and requires workup     Amount and/or Complexity of Data Reviewed  Clinical lab tests: ordered and reviewed  Review and summarize past medical records: yes    Risk of Complications, Morbidity, and/or Mortality  Presenting problems: moderate  Diagnostic procedures: minimal  Management options: moderate    Patient Progress  Patient progress: improved        ED Course       Procedures    The patient was observed in the ED.     Results Reviewed:      Recent Results (from the past 24 hour(s))   CBC WITH AUTOMATED DIFF    Collection Time: 02/01/18  3:15 AM   Result Value Ref Range    WBC 8.1 4.3 - 11.1 K/uL    RBC 4.69 4.05 - 5.25 M/uL    HGB 13.3 11.7 - 15.4 g/dL    HCT 40.0 35.8 - 46.3 %    MCV 85.3 79.6 - 97.8 FL    MCH 28.4 26.1 - 32.9 PG    MCHC 33.3 31.4 - 35.0 g/dL    RDW 12.6 11.9 - 14.6 %    PLATELET 086 756 - 694 K/uL    MPV 9.9 (L) 10.8 - 14.1 FL    DF AUTOMATED      NEUTROPHILS 44 43 - 78 %    LYMPHOCYTES 44 13 - 44 %    MONOCYTES 9 4.0 - 12.0 %    EOSINOPHILS 2 0.5 - 7.8 %    BASOPHILS 1 0.0 - 2.0 %    IMMATURE GRANULOCYTES 0 0.0 - 5.0 %    ABS. NEUTROPHILS 3.6 1.7 - 8.2 K/UL    ABS. LYMPHOCYTES 3.6 0.5 - 4.6 K/UL    ABS. MONOCYTES 0.7 0.1 - 1.3 K/UL    ABS. EOSINOPHILS 0.2 0.0 - 0.8 K/UL    ABS. BASOPHILS 0.1 0.0 - 0.2 K/UL    ABS. IMM. GRANS. 0.0 0.0 - 0.5 K/UL   METABOLIC PANEL, BASIC    Collection Time: 02/01/18  3:15 AM   Result Value Ref Range    Sodium 142 136 - 145 mmol/L    Potassium 3.2 (L) 3.5 - 5.1 mmol/L    Chloride 102 98 - 107 mmol/L    CO2 29 21 - 32 mmol/L    Anion gap 11 7 - 16 mmol/L    Glucose 83 65 - 100 mg/dL    BUN 14 6 - 23 MG/DL    Creatinine 0.90 0.6 - 1.0 MG/DL    GFR est AA >60 >60 ml/min/1.73m2    GFR est non-AA >60 >60 ml/min/1.73m2    Calcium 9.2 8.3 - 10.4 MG/DL       I discussed the results of all labs, procedures, radiographs, and treatments with the patient and available family. Treatment plan is agreed upon and the patient is ready for discharge. All voiced understanding of the discharge plan and medication instructions or changes as appropriate. Questions about treatment in the ED were answered. All were encouraged to return should symptoms worsen or new problems develop.

## 2018-02-01 NOTE — ED TRIAGE NOTES
Pt presents to ER for vaginal bleeding since 1800 that has increased since onset.   Pt has had a DAJUAN, denies that bleeding is rectal.

## 2018-06-12 PROBLEM — R20.2 PARESTHESIA AND PAIN OF RIGHT EXTREMITY: Status: ACTIVE | Noted: 2018-06-12

## 2018-06-12 PROBLEM — M79.609 PARESTHESIA AND PAIN OF RIGHT EXTREMITY: Status: ACTIVE | Noted: 2018-06-12

## 2018-06-26 ENCOUNTER — HOSPITAL ENCOUNTER (OUTPATIENT)
Dept: MRI IMAGING | Age: 33
Discharge: HOME | End: 2018-06-26
Attending: PSYCHIATRY & NEUROLOGY
Payer: COMMERCIAL

## 2018-06-26 DIAGNOSIS — R20.2 PARESTHESIA AND PAIN OF RIGHT EXTREMITY: ICD-10-CM

## 2018-06-26 DIAGNOSIS — M79.609 PARESTHESIA AND PAIN OF RIGHT EXTREMITY: ICD-10-CM

## 2018-06-26 DIAGNOSIS — G43.009 MIGRAINE WITHOUT AURA AND WITHOUT STATUS MIGRAINOSUS, NOT INTRACTABLE: ICD-10-CM

## 2018-06-26 PROCEDURE — 70551 MRI BRAIN STEM W/O DYE: CPT

## 2018-07-12 PROBLEM — G56.03 CARPAL TUNNEL SYNDROME, BILATERAL: Status: ACTIVE | Noted: 2018-07-12

## 2018-12-17 ENCOUNTER — ANESTHESIA EVENT (OUTPATIENT)
Dept: SURGERY | Age: 33
End: 2018-12-17
Payer: COMMERCIAL

## 2018-12-17 ENCOUNTER — HOSPITAL ENCOUNTER (OUTPATIENT)
Age: 33
Setting detail: OUTPATIENT SURGERY
Discharge: HOME | End: 2018-12-17
Attending: UROLOGY | Admitting: UROLOGY
Payer: COMMERCIAL

## 2018-12-17 ENCOUNTER — ANESTHESIA (OUTPATIENT)
Dept: SURGERY | Age: 33
End: 2018-12-17
Payer: COMMERCIAL

## 2018-12-17 VITALS
SYSTOLIC BLOOD PRESSURE: 138 MMHG | TEMPERATURE: 98.1 F | RESPIRATION RATE: 16 BRPM | HEART RATE: 77 BPM | OXYGEN SATURATION: 98 % | DIASTOLIC BLOOD PRESSURE: 82 MMHG

## 2018-12-17 DIAGNOSIS — N30.10 INTERSTITIAL CYSTITIS: Primary | ICD-10-CM

## 2018-12-17 LAB — POTASSIUM BLD-SCNC: 3.3 MMOL/L (ref 3.5–5.1)

## 2018-12-17 PROCEDURE — 77030032490 HC SLV COMPR SCD KNE COVD -B: Performed by: UROLOGY

## 2018-12-17 PROCEDURE — 74011250636 HC RX REV CODE- 250/636

## 2018-12-17 PROCEDURE — 74011250636 HC RX REV CODE- 250/636: Performed by: ANESTHESIOLOGY

## 2018-12-17 PROCEDURE — 77030018832 HC SOL IRR H20 ICUM -A: Performed by: UROLOGY

## 2018-12-17 PROCEDURE — 76060000032 HC ANESTHESIA 0.5 TO 1 HR: Performed by: UROLOGY

## 2018-12-17 PROCEDURE — 77030019927 HC TBNG IRR CYSTO BAXT -A: Performed by: UROLOGY

## 2018-12-17 PROCEDURE — 77030010509 HC AIRWY LMA MSK TELE -A: Performed by: ANESTHESIOLOGY

## 2018-12-17 PROCEDURE — 74011000250 HC RX REV CODE- 250: Performed by: UROLOGY

## 2018-12-17 PROCEDURE — 76210000006 HC OR PH I REC 0.5 TO 1 HR: Performed by: UROLOGY

## 2018-12-17 PROCEDURE — 76010000138 HC OR TIME 0.5 TO 1 HR: Performed by: UROLOGY

## 2018-12-17 PROCEDURE — 76210000021 HC REC RM PH II 0.5 TO 1 HR: Performed by: UROLOGY

## 2018-12-17 PROCEDURE — 84132 ASSAY OF SERUM POTASSIUM: CPT

## 2018-12-17 RX ORDER — SODIUM CHLORIDE 0.9 % (FLUSH) 0.9 %
5-10 SYRINGE (ML) INJECTION AS NEEDED
Status: DISCONTINUED | OUTPATIENT
Start: 2018-12-17 | End: 2018-12-17 | Stop reason: HOSPADM

## 2018-12-17 RX ORDER — BUPIVACAINE HYDROCHLORIDE 5 MG/ML
INJECTION, SOLUTION EPIDURAL; INTRACAUDAL AS NEEDED
Status: DISCONTINUED | OUTPATIENT
Start: 2018-12-17 | End: 2018-12-17 | Stop reason: HOSPADM

## 2018-12-17 RX ORDER — CEFAZOLIN SODIUM 1 G/3ML
INJECTION, POWDER, FOR SOLUTION INTRAMUSCULAR; INTRAVENOUS AS NEEDED
Status: DISCONTINUED | OUTPATIENT
Start: 2018-12-17 | End: 2018-12-17 | Stop reason: HOSPADM

## 2018-12-17 RX ORDER — SODIUM CHLORIDE, SODIUM LACTATE, POTASSIUM CHLORIDE, CALCIUM CHLORIDE 600; 310; 30; 20 MG/100ML; MG/100ML; MG/100ML; MG/100ML
75 INJECTION, SOLUTION INTRAVENOUS CONTINUOUS
Status: DISCONTINUED | OUTPATIENT
Start: 2018-12-17 | End: 2018-12-17 | Stop reason: HOSPADM

## 2018-12-17 RX ORDER — LIDOCAINE HYDROCHLORIDE 20 MG/ML
INJECTION, SOLUTION EPIDURAL; INFILTRATION; INTRACAUDAL; PERINEURAL AS NEEDED
Status: DISCONTINUED | OUTPATIENT
Start: 2018-12-17 | End: 2018-12-17 | Stop reason: HOSPADM

## 2018-12-17 RX ORDER — MIDAZOLAM HYDROCHLORIDE 1 MG/ML
2 INJECTION, SOLUTION INTRAMUSCULAR; INTRAVENOUS
Status: DISCONTINUED | OUTPATIENT
Start: 2018-12-17 | End: 2018-12-17 | Stop reason: HOSPADM

## 2018-12-17 RX ORDER — LIDOCAINE HYDROCHLORIDE 10 MG/ML
0.1 INJECTION INFILTRATION; PERINEURAL AS NEEDED
Status: DISCONTINUED | OUTPATIENT
Start: 2018-12-17 | End: 2018-12-17 | Stop reason: HOSPADM

## 2018-12-17 RX ORDER — ONDANSETRON 2 MG/ML
INJECTION INTRAMUSCULAR; INTRAVENOUS AS NEEDED
Status: DISCONTINUED | OUTPATIENT
Start: 2018-12-17 | End: 2018-12-17 | Stop reason: HOSPADM

## 2018-12-17 RX ORDER — SODIUM CHLORIDE, SODIUM LACTATE, POTASSIUM CHLORIDE, CALCIUM CHLORIDE 600; 310; 30; 20 MG/100ML; MG/100ML; MG/100ML; MG/100ML
INJECTION, SOLUTION INTRAVENOUS
Status: DISCONTINUED | OUTPATIENT
Start: 2018-12-17 | End: 2018-12-17 | Stop reason: HOSPADM

## 2018-12-17 RX ORDER — HYDROCODONE BITARTRATE AND ACETAMINOPHEN 5; 325 MG/1; MG/1
1 TABLET ORAL
Qty: 20 TAB | Refills: 0 | Status: SHIPPED | OUTPATIENT
Start: 2018-12-17 | End: 2019-02-04

## 2018-12-17 RX ORDER — CEFAZOLIN SODIUM/WATER 2 G/20 ML
2 SYRINGE (ML) INTRAVENOUS
Status: DISCONTINUED | OUTPATIENT
Start: 2018-12-17 | End: 2018-12-17 | Stop reason: HOSPADM

## 2018-12-17 RX ORDER — NALOXONE HYDROCHLORIDE 0.4 MG/ML
0.2 INJECTION, SOLUTION INTRAMUSCULAR; INTRAVENOUS; SUBCUTANEOUS AS NEEDED
Status: DISCONTINUED | OUTPATIENT
Start: 2018-12-17 | End: 2018-12-17 | Stop reason: HOSPADM

## 2018-12-17 RX ORDER — PROPOFOL 10 MG/ML
INJECTION, EMULSION INTRAVENOUS AS NEEDED
Status: DISCONTINUED | OUTPATIENT
Start: 2018-12-17 | End: 2018-12-17 | Stop reason: HOSPADM

## 2018-12-17 RX ORDER — IMIPRAMINE HYDROCHLORIDE 25 MG/1
25 TABLET ORAL
Qty: 30 TAB | Refills: 12 | Status: SHIPPED | OUTPATIENT
Start: 2018-12-17 | End: 2020-06-11 | Stop reason: CLARIF

## 2018-12-17 RX ORDER — OXYCODONE AND ACETAMINOPHEN 5; 325 MG/1; MG/1
1 TABLET ORAL AS NEEDED
Status: DISCONTINUED | OUTPATIENT
Start: 2018-12-17 | End: 2018-12-17 | Stop reason: HOSPADM

## 2018-12-17 RX ORDER — DEXAMETHASONE SODIUM PHOSPHATE 4 MG/ML
INJECTION, SOLUTION INTRA-ARTICULAR; INTRALESIONAL; INTRAMUSCULAR; INTRAVENOUS; SOFT TISSUE AS NEEDED
Status: DISCONTINUED | OUTPATIENT
Start: 2018-12-17 | End: 2018-12-17 | Stop reason: HOSPADM

## 2018-12-17 RX ORDER — OXYBUTYNIN CHLORIDE 15 MG/1
10 TABLET, EXTENDED RELEASE ORAL DAILY
Qty: 30 TAB | Refills: 12 | Status: SHIPPED | OUTPATIENT
Start: 2018-12-17 | End: 2019-02-06

## 2018-12-17 RX ORDER — SODIUM CHLORIDE 0.9 % (FLUSH) 0.9 %
5-10 SYRINGE (ML) INJECTION EVERY 8 HOURS
Status: DISCONTINUED | OUTPATIENT
Start: 2018-12-17 | End: 2018-12-17 | Stop reason: HOSPADM

## 2018-12-17 RX ORDER — HYDROMORPHONE HYDROCHLORIDE 2 MG/ML
0.5 INJECTION, SOLUTION INTRAMUSCULAR; INTRAVENOUS; SUBCUTANEOUS
Status: DISCONTINUED | OUTPATIENT
Start: 2018-12-17 | End: 2018-12-17 | Stop reason: HOSPADM

## 2018-12-17 RX ORDER — FENTANYL CITRATE 50 UG/ML
INJECTION, SOLUTION INTRAMUSCULAR; INTRAVENOUS AS NEEDED
Status: DISCONTINUED | OUTPATIENT
Start: 2018-12-17 | End: 2018-12-17 | Stop reason: HOSPADM

## 2018-12-17 RX ADMIN — SODIUM CHLORIDE, SODIUM LACTATE, POTASSIUM CHLORIDE, CALCIUM CHLORIDE: 600; 310; 30; 20 INJECTION, SOLUTION INTRAVENOUS at 08:05

## 2018-12-17 RX ADMIN — CEFAZOLIN SODIUM 2 G: 1 INJECTION, POWDER, FOR SOLUTION INTRAMUSCULAR; INTRAVENOUS at 07:50

## 2018-12-17 RX ADMIN — PROPOFOL 200 MG: 10 INJECTION, EMULSION INTRAVENOUS at 08:11

## 2018-12-17 RX ADMIN — FENTANYL CITRATE 50 MCG: 50 INJECTION, SOLUTION INTRAMUSCULAR; INTRAVENOUS at 08:20

## 2018-12-17 RX ADMIN — FENTANYL CITRATE 50 MCG: 50 INJECTION, SOLUTION INTRAMUSCULAR; INTRAVENOUS at 08:11

## 2018-12-17 RX ADMIN — SODIUM CHLORIDE, SODIUM LACTATE, POTASSIUM CHLORIDE, AND CALCIUM CHLORIDE 75 ML/HR: 600; 310; 30; 20 INJECTION, SOLUTION INTRAVENOUS at 06:40

## 2018-12-17 RX ADMIN — DEXAMETHASONE SODIUM PHOSPHATE 4 MG: 4 INJECTION, SOLUTION INTRA-ARTICULAR; INTRALESIONAL; INTRAMUSCULAR; INTRAVENOUS; SOFT TISSUE at 08:06

## 2018-12-17 RX ADMIN — CEFAZOLIN SODIUM 2 G: 1 INJECTION, POWDER, FOR SOLUTION INTRAMUSCULAR; INTRAVENOUS at 08:05

## 2018-12-17 RX ADMIN — LIDOCAINE HYDROCHLORIDE 60 MG: 20 INJECTION, SOLUTION EPIDURAL; INFILTRATION; INTRACAUDAL; PERINEURAL at 08:11

## 2018-12-17 RX ADMIN — ONDANSETRON 4 MG: 2 INJECTION INTRAMUSCULAR; INTRAVENOUS at 08:11

## 2018-12-17 NOTE — DISCHARGE INSTRUCTIONS
CYSTOSCOPY    ACTIVITY   · take it easy today . Resume normal activity tomorrow   · May shower tomorrow    DIET  · Clear liquids until no nausea or vomiting; then light diet for the first day. · Drink plenty of liquids. At least 8 glasses of water to help flush out bladder. Limit amount of caffeine. · Advance to regular diet on second day, unless your doctor orders otherwise. · If nausea and vomiting continues, call your doctor. PAIN  · Take pain medication as directed by your doctor. · Call your doctor if pain is NOT relieved by medication. CALL YOUR DOCTOR IF  · Expect blood-tinged urine. Call your doctor if it lasts more than 72 hours or if you cannot see through the urine. · Temperature of 101 degrees or above. · Unable to empty bladder. AFTER ANESTHESIA  · For the next 24 hours: DO NOT Drive, drink alcoholic beverages, or Make important decisions. · Be aware of dizziness following anesthesia and while taking pain medications    APPOINTMENT DATE/ TIME  4 to 6 weeks his office will call you    YOUR DOCTOR'S PHONE NUMBER [phone]      DISCHARGE SUMMARY from Nurse    PATIENT INSTRUCTIONS:    After general anesthesia or intravenous sedation, for 24 hours or while taking prescription Narcotics:  · Limit your activities  · Do not drive and operate hazardous machinery  · Do not make important personal or business decisions  · Do  not drink alcoholic beverages  · If you have not urinated within 8 hours after discharge, please contact your surgeon on call. *  Please give a list of your current medications to your Primary Care Provider. *  Please update this list whenever your medications are discontinued, doses are      changed, or new medications (including over-the-counter products) are added. *  Please carry medication information at all times in case of emergency situations.       These are general instructions for a healthy lifestyle:    No smoking/ No tobacco products/ Avoid exposure to second hand smoke    Surgeon General's Warning:  Quitting smoking now greatly reduces serious risk to your health. Obesity, smoking, and sedentary lifestyle greatly increases your risk for illness    A healthy diet, regular physical exercise & weight monitoring are important for maintaining a healthy lifestyle    You may be retaining fluid if you have a history of heart failure or if you experience any of the following symptoms:  Weight gain of 3 pounds or more overnight or 5 pounds in a week, increased swelling in our hands or feet or shortness of breath while lying flat in bed. Please call your doctor as soon as you notice any of these symptoms; do not wait until your next office visit. Recognize signs and symptoms of STROKE:    F-face looks uneven    A-arms unable to move or move unevenly    S-speech slurred or non-existent    T-time-call 911 as soon as signs and symptoms begin-DO NOT go       Back to bed or wait to see if you get better-TIME IS BRAIN.

## 2018-12-17 NOTE — ANESTHESIA PREPROCEDURE EVALUATION
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

## 2018-12-17 NOTE — H&P
Jayne Cruz  : 1985          Chief Complaint   Patient presents with    Interstitial Cystitis       new patient   Patient referred by Ms. Stephenie Mckoy NP. She is been evaluated for recurrent UTIs and pelvic pain she does have some degenerative disease of the spine. Comes in today for evaluation for possible hydrodistention of the bladder. She been evaluated in the past by [address] [place] [address] urology group        HPI      Jayne Cruz is a 28 y.o. female     Patient had a previous partial hysterectomy. Has some degenerative disease of the spine is had evaluation and is treated for that. She also has irritable bowels been on Linzess. Patient has urgency and frequency and nocturia. She is tried Elmiron she is tried Myrbetriq none of these of helped she has frequency and urgency and does not feel like she can empty completely she gets dysuria and she thinks she has an infection looked at the urine today there was a few red cells but no obvious infection. She had a CT scan in 2017 did not show any  pathology. Serum creatinine is 0.9 as of the beginning of this year.   Patient's on Linzess, Flexeril, Levsin sublingual, and Diovan/HCTZ.     Postvoid residual today shows that her bladder is empty.             Past Medical History:   Diagnosis Date    Anemia      Arrhythmia 10/2015     Supraventricular tachycardia    Chronic pain       back pain-- prn meds    Hypertension       manged with meds    IBS (irritable bowel syndrome)      Kidney stones      Meniere's disease      Migraine      Migraines      Palpitations      Paresthesia and pain of right extremity 2018    PONV (postoperative nausea and vomiting)      Pulmonary embolus (HCC)      SVT (supraventricular tachycardia) [facility])       Dr Girma Gallardo Cardiology / ablation 2016    Varicella              Past Surgical History:   Procedure Laterality Date    COLONOSCOPY N/A 2017     COLONOSCOPY BMI 30 performed by Luis Campos MD at [address]         x 2    HX HERNIA REPAIR   10/2016    HX HERNIA REPAIR   2009     Abdominal    HX HYSTERECTOMY   10/2016    HX MYOMECTOMY        HX ROTATOR CUFF REPAIR Right 2014    HX SVT ABLATION   2016    HX SVT ABLATION   02/15/2016    HX TUBAL LIGATION   2015    NM COLONOSCOPY W/BIOPSY SINGLE/MULTIPLE   5/8/2017                   Current Outpatient Medications   Medication Sig Dispense Refill    valsartan-hydroCHLOROthiazide (DIOVAN-HCT) 320-25 mg per tablet Take 1 Tab by mouth.        linaclotide (LINZESS) 290 mcg cap capsule Take 145 mcg by mouth Daily (before breakfast).        cyclobenzaprine (FLEXERIL) 10 mg tablet TAKE 1 TABLET BY MOUTH 3 TIMES A DAY AS NEEDED FOR SPASM   0    hyoscyamine SL (LEVSIN/SL) 0.125 mg SL tablet 2 Tabs by SubLINGual route every six (6) hours as needed for Cramping. 60 Tab 1      No Known Allergies  Social History            Socioeconomic History    Marital status:        Spouse name: Not on file    Number of children: Not on file    Years of education: Not on file    Highest education level: Not on file   Social Needs    Financial resource strain: Not on file    Food insecurity - worry: Not on file    Food insecurity - inability: Not on file    Transportation needs - medical: Not on file   [place] needs - non-medical: Not on file   Occupational History    Not on file   Tobacco Use    Smoking status: Never Smoker    Smokeless tobacco: Never Used   Substance and Sexual Activity    Alcohol use: Yes       Comment:  Occ    Drug use: No    Sexual activity: Yes       Partners: Male       Birth control/protection: None   Other Topics Concern    Not on file   Social History Narrative    Not on file            Family History   Problem Relation Age of Onset    Ulcerative Colitis Mother           also Castleman's disease    No Known Problems Father      Crohn's Disease Sister           pt reports Crohn's and UC (?)    Crohn's Disease Maternal Grandmother      Colon Cancer Maternal Grandfather           Review of Systems  Constitutional: Positive for fever, chills, appetite change, malaise/fatigue and headaches. Skin: Positive for rash. Eyes: Eyes negative  ENT: Positive for dry mouth. Respiratory: Respiratory negative  Cardiovascular: Positive for hypertension, irregular heartbeat, leg swelling and regular rate and rhythm. GI: Positive for nausea, vomiting, abdominal pain, blood in stool, constipation, diarrhea, indigestion and heartburn. Genitourinary: Positive for urinary burning, flank pain, history of urolithiasis, nocturia, slower stream, straining, urgency, frequent urination, incomplete emptying, menstrual problem, endometriosis, vaginal pain and hysterectomy. Number of pregnancies: 3. Number of births: 3. Musculoskeletal: Positive for back pain, bone pain, arthralgias, tenderness, muscle weakness and neck pain. Neurological: Positive for dizziness. Psychological: Neg psych ROS  Endocrine: Positive for thirst, excessive urination and fatigue.     Physical Exam   Constitutional: She appears well-developed and well-nourished. Cardiovascular: Normal rate. Pulmonary/Chest: Effort normal.   Abdominal: Soft. Skin: Skin is warm and dry. Psychiatric: She has a normal mood and affect.        Assessment and Plan      ICD-10-CM ICD-9-CM     1. IC (interstitial cystitis) N30.10 595.1 AMB POC URINALYSIS DIP STICK AUTO W/ MICRO (PGU)                Patient could possibly have interstitial cystitis. I recommend we do a cystoscopy and hydrodistention of the bladder.     Patient has a lot of issues. Patient may even have celiac disease [facility]. Has not responded anticholinergics are Elmiron. Elmiron made her lose her hair for a while.   We will do the hydrodistention procedure discussed the procedure with her including risk complications and will set this up sometime in the near future.     Patient may even have some problem with celiac disease and may need to be evaluated for that.

## 2018-12-17 NOTE — BRIEF OP NOTE
BRIEF OPERATIVE NOTE    Date of Procedure: 12/17/2018   Preoperative Diagnosis: IC (interstitial cystitis) [N30.10]  Postoperative Diagnosis: IC (interstitial cystitis) [N30.10]    Procedure(s):  CYSTOSCOPY HYDRODEITENTION OF BLADDER, urethral dilitaion   Surgeon(s) and Role:     Maranda Navarro MD - Primary         Surgical Assistant:     Surgical Staff:  Circ-1: Javier Topete RN  Scrub Tech-1: Ailyn Carr  Event Time In Time Out   Incision Start 0820    Incision Close 0828      Anesthesia: General   Estimated Blood Loss:   Specimens: * No specimens in log *   Findings:    Complications:   Implants: * No implants in log *

## 2018-12-17 NOTE — PROGRESS NOTES
's Pre-op visit requested by patient. Conveyed care and concern for patient and family. Offered prayer as requested for patient, family, and staff.     Dina Ahmadi MDiv, BS  Board Certified

## 2019-02-06 ENCOUNTER — HOSPITAL ENCOUNTER (OUTPATIENT)
Dept: LAB | Age: 34
Discharge: HOME | End: 2019-02-06
Payer: COMMERCIAL

## 2019-02-06 DIAGNOSIS — R06.02 SHORTNESS OF BREATH: ICD-10-CM

## 2019-02-06 PROBLEM — R53.82 CHRONIC FATIGUE: Status: ACTIVE | Noted: 2019-02-06

## 2019-02-06 PROBLEM — R07.2 PRECORDIAL PAIN: Status: ACTIVE | Noted: 2019-02-06

## 2019-02-06 LAB
ALBUMIN SERPL-MCNC: 3.5 G/DL (ref 3.5–5)
ALBUMIN/GLOB SERPL: 1 {RATIO}
ALP SERPL-CCNC: 78 U/L (ref 50–136)
ALT SERPL-CCNC: 20 U/L (ref 12–65)
ANION GAP SERPL CALC-SCNC: 6 MMOL/L
AST SERPL-CCNC: 11 U/L (ref 15–37)
BASOPHILS # BLD: 0.1 K/UL (ref 0–0.2)
BASOPHILS NFR BLD: 1 % (ref 0–2)
BILIRUB SERPL-MCNC: 0.2 MG/DL (ref 0.2–1.1)
BNP SERPL-MCNC: 79 PG/ML
BUN SERPL-MCNC: 16 MG/DL (ref 6–23)
CALCIUM SERPL-MCNC: 8.2 MG/DL (ref 8.3–10.4)
CHLORIDE SERPL-SCNC: 111 MMOL/L (ref 98–107)
CO2 SERPL-SCNC: 25 MMOL/L (ref 21–32)
CREAT SERPL-MCNC: 0.8 MG/DL (ref 0.6–1)
DIFFERENTIAL METHOD BLD: ABNORMAL
EOSINOPHIL # BLD: 0.1 K/UL (ref 0–0.8)
EOSINOPHIL NFR BLD: 3 % (ref 0.5–7.8)
ERYTHROCYTE [DISTWIDTH] IN BLOOD BY AUTOMATED COUNT: 12.9 % (ref 11.9–14.6)
GLOBULIN SER CALC-MCNC: 3.6 G/DL
GLUCOSE SERPL-MCNC: 87 MG/DL (ref 65–100)
HCT VFR BLD AUTO: 36.8 % (ref 35.8–46.3)
HGB BLD-MCNC: 12.2 G/DL (ref 11.7–15.4)
IMM GRANULOCYTES # BLD AUTO: 0 K/UL (ref 0–0.5)
IMM GRANULOCYTES NFR BLD AUTO: 0 % (ref 0–5)
LYMPHOCYTES # BLD: 2.5 K/UL (ref 0.5–4.6)
LYMPHOCYTES NFR BLD: 51 % (ref 13–44)
MAGNESIUM SERPL-MCNC: 2.2 MG/DL (ref 1.8–2.4)
MCH RBC QN AUTO: 29.2 PG (ref 26.1–32.9)
MCHC RBC AUTO-ENTMCNC: 33.2 G/DL (ref 31.4–35)
MCV RBC AUTO: 88 FL (ref 79.6–97.8)
MONOCYTES # BLD: 0.4 K/UL (ref 0.1–1.3)
MONOCYTES NFR BLD: 8 % (ref 4–12)
NEUTS SEG # BLD: 1.9 K/UL (ref 1.7–8.2)
NEUTS SEG NFR BLD: 37 % (ref 43–78)
NRBC # BLD: 0 K/UL (ref 0–0.2)
PLATELET # BLD AUTO: 263 K/UL (ref 150–450)
PMV BLD AUTO: 9.3 FL (ref 9.4–12.3)
POTASSIUM SERPL-SCNC: 3.6 MMOL/L (ref 3.5–5.1)
PROT SERPL-MCNC: 7.1 G/DL (ref 6.3–8.2)
RBC # BLD AUTO: 4.18 M/UL (ref 4.05–5.2)
SODIUM SERPL-SCNC: 142 MMOL/L (ref 136–145)
TSH SERPL DL<=0.005 MIU/L-ACNC: 0.91 UIU/ML (ref 0.36–3.74)
WBC # BLD AUTO: 5 K/UL (ref 4.3–11.1)

## 2019-02-06 PROCEDURE — 82306 VITAMIN D 25 HYDROXY: CPT

## 2019-02-06 PROCEDURE — 84443 ASSAY THYROID STIM HORMONE: CPT

## 2019-02-06 PROCEDURE — 36415 COLL VENOUS BLD VENIPUNCTURE: CPT

## 2019-02-06 PROCEDURE — 83735 ASSAY OF MAGNESIUM: CPT

## 2019-02-06 PROCEDURE — 80053 COMPREHEN METABOLIC PANEL: CPT

## 2019-02-06 PROCEDURE — 83880 ASSAY OF NATRIURETIC PEPTIDE: CPT

## 2019-02-06 PROCEDURE — 85025 COMPLETE CBC W/AUTO DIFF WBC: CPT

## 2019-02-06 NOTE — PROGRESS NOTES
Please call her, labs were very good. Normal TSH. Very mild anemia, we will need to follow, nothing severe. Still waiting on Vit D. Normal liver, kidneys. Continue with plan, see me after.   
Thanks Applied

## 2019-02-07 LAB — 25(OH)D3+25(OH)D2 SERPL-MCNC: 15.1 NG/ML (ref 30–100)

## 2019-02-07 NOTE — PROGRESS NOTES
Please call her, labs were good but Vit D was very low. This could cause some of her symptoms. So, we need to do the Vit D 50,000 PO per week for 8 weeks, then Vit D 3 2000 per day (this is the OTC Vit D). Can we move up her echo, stress echo and see me after as well.   
Thanks

## 2019-05-01 PROBLEM — N94.19 DYSPAREUNIA DUE TO MEDICAL CONDITION IN FEMALE PATIENT: Status: ACTIVE | Noted: 2019-05-01

## 2019-05-01 PROBLEM — Z87.42 HISTORY OF ENDOMETRIOSIS: Status: ACTIVE | Noted: 2019-05-01

## 2019-05-01 PROBLEM — M41.57 SCOLIOSIS OF LUMBOSACRAL REGION DUE TO DEGENERATIVE DISEASE OF SPINE IN ADULT: Status: ACTIVE | Noted: 2019-05-01

## 2019-05-01 PROBLEM — E28.39 ESTROGEN DEFICIENCY: Status: ACTIVE | Noted: 2019-05-01

## 2019-05-01 PROBLEM — K59.09 CHRONIC CONSTIPATION: Status: ACTIVE | Noted: 2019-05-01

## 2019-05-01 PROBLEM — N76.0: Status: ACTIVE | Noted: 2019-05-01

## 2019-10-22 ENCOUNTER — HOSPITAL ENCOUNTER (OUTPATIENT)
Dept: LAB | Age: 34
Discharge: HOME | End: 2019-10-22

## 2019-10-22 PROCEDURE — 88312 SPECIAL STAINS GROUP 1: CPT

## 2019-10-22 PROCEDURE — 88305 TISSUE EXAM BY PATHOLOGIST: CPT

## 2020-01-07 ENCOUNTER — HOSPITAL ENCOUNTER (OUTPATIENT)
Dept: GENERAL RADIOLOGY | Age: 35
Discharge: HOME | End: 2020-01-07
Payer: COMMERCIAL

## 2020-01-07 DIAGNOSIS — K59.00 CONSTIPATION: ICD-10-CM

## 2020-01-07 PROCEDURE — 74022 RADEX COMPL AQT ABD SERIES: CPT

## 2020-06-12 NOTE — PROGRESS NOTES
Confirmed arrival time with patient. No concerns noted at this time.  will be present. COVID precautions will be taken.

## 2020-06-14 ENCOUNTER — ANESTHESIA EVENT (OUTPATIENT)
Dept: ENDOSCOPY | Age: 35
End: 2020-06-14
Payer: COMMERCIAL

## 2020-06-15 ENCOUNTER — HOSPITAL ENCOUNTER (OUTPATIENT)
Age: 35
Setting detail: OUTPATIENT SURGERY
Discharge: HOME | End: 2020-06-15
Attending: INTERNAL MEDICINE | Admitting: INTERNAL MEDICINE
Payer: COMMERCIAL

## 2020-06-15 ENCOUNTER — ANESTHESIA (OUTPATIENT)
Dept: ENDOSCOPY | Age: 35
End: 2020-06-15
Payer: COMMERCIAL

## 2020-06-15 VITALS
RESPIRATION RATE: 16 BRPM | HEART RATE: 76 BPM | TEMPERATURE: 98.5 F | SYSTOLIC BLOOD PRESSURE: 121 MMHG | OXYGEN SATURATION: 99 % | DIASTOLIC BLOOD PRESSURE: 62 MMHG

## 2020-06-15 PROCEDURE — 74011250636 HC RX REV CODE- 250/636: Performed by: NURSE ANESTHETIST, CERTIFIED REGISTERED

## 2020-06-15 PROCEDURE — 74011250636 HC RX REV CODE- 250/636: Performed by: ANESTHESIOLOGY

## 2020-06-15 PROCEDURE — 76060000031 HC ANESTHESIA FIRST 0.5 HR: Performed by: INTERNAL MEDICINE

## 2020-06-15 PROCEDURE — 77030021593 HC FCPS BIOP ENDOSC BSC -A: Performed by: INTERNAL MEDICINE

## 2020-06-15 PROCEDURE — 74011250637 HC RX REV CODE- 250/637: Performed by: ANESTHESIOLOGY

## 2020-06-15 PROCEDURE — 74011000250 HC RX REV CODE- 250: Performed by: NURSE ANESTHETIST, CERTIFIED REGISTERED

## 2020-06-15 PROCEDURE — 88305 TISSUE EXAM BY PATHOLOGIST: CPT

## 2020-06-15 PROCEDURE — 76040000025: Performed by: INTERNAL MEDICINE

## 2020-06-15 RX ORDER — PROPOFOL 10 MG/ML
INJECTION, EMULSION INTRAVENOUS AS NEEDED
Status: DISCONTINUED | OUTPATIENT
Start: 2020-06-15 | End: 2020-06-15 | Stop reason: HOSPADM

## 2020-06-15 RX ORDER — SODIUM CHLORIDE, SODIUM LACTATE, POTASSIUM CHLORIDE, CALCIUM CHLORIDE 600; 310; 30; 20 MG/100ML; MG/100ML; MG/100ML; MG/100ML
100 INJECTION, SOLUTION INTRAVENOUS CONTINUOUS
Status: DISCONTINUED | OUTPATIENT
Start: 2020-06-15 | End: 2020-06-15 | Stop reason: HOSPADM

## 2020-06-15 RX ORDER — PROPOFOL 10 MG/ML
INJECTION, EMULSION INTRAVENOUS
Status: DISCONTINUED | OUTPATIENT
Start: 2020-06-15 | End: 2020-06-15 | Stop reason: HOSPADM

## 2020-06-15 RX ORDER — FAMOTIDINE 20 MG/1
20 TABLET, FILM COATED ORAL AS NEEDED
Status: COMPLETED | OUTPATIENT
Start: 2020-06-15 | End: 2020-06-15

## 2020-06-15 RX ORDER — LIDOCAINE HYDROCHLORIDE 20 MG/ML
INJECTION, SOLUTION EPIDURAL; INFILTRATION; INTRACAUDAL; PERINEURAL AS NEEDED
Status: DISCONTINUED | OUTPATIENT
Start: 2020-06-15 | End: 2020-06-15 | Stop reason: HOSPADM

## 2020-06-15 RX ADMIN — PROPOFOL 140 MCG/KG/MIN: 10 INJECTION, EMULSION INTRAVENOUS at 09:07

## 2020-06-15 RX ADMIN — PROPOFOL 50 MG: 10 INJECTION, EMULSION INTRAVENOUS at 09:07

## 2020-06-15 RX ADMIN — FAMOTIDINE 20 MG: 20 TABLET, FILM COATED ORAL at 08:37

## 2020-06-15 RX ADMIN — LIDOCAINE HYDROCHLORIDE 60 MG: 20 INJECTION, SOLUTION EPIDURAL; INFILTRATION; INTRACAUDAL; PERINEURAL at 09:07

## 2020-06-15 RX ADMIN — SODIUM CHLORIDE, SODIUM LACTATE, POTASSIUM CHLORIDE, AND CALCIUM CHLORIDE 100 ML/HR: 600; 310; 30; 20 INJECTION, SOLUTION INTRAVENOUS at 08:37

## 2020-06-15 NOTE — PROCEDURES
COLONOSCOPY    DATE of PROCEDURE: 6/15/2020    INDICATIONS:  1. Constipation  2. Diarrhea  3. Rectal bleed    MEDICATION:  MAC      INSTRUMENT: PCF    PROCEDURE: After obtaining informed consent, the patient was placed in the left lateral position and sedated. The endoscope was advanced to the cecum where the appendiceal orifice and ileocecal valve were identified. On withdrawal, the colon was carefully visualized in a 360 degree fashion. Retroflexion was performed in the rectum. The patient was taken to the recovery area in stable condition. FINDINGS:  Poor prep with retained thick liquid stool. Views limited. Other findings noted below. Random biopsies were taken of the right and left colon. Four biopsies were taken from each area. Anus: external hemorrhoids  Rectum: internal hemorrhoids  Sigmoid: normal  Descending Colon: normal  Transverse Colon: normal  Ascending Colon: normal.  Retroflexion performed with good visualization behind the colonic folds. Cecum: normal  Terminal ileum: not entered    Estimated blood loss: 0-minimal     ASSESSMENT/PLAN:  1. Continue Linzess, Miralax and Benefiber  2. Dulcolax PRN  3.  Follow with me in the office      Margarito Kim MD  [facility] [place]

## 2020-06-15 NOTE — H&P
[facility] H&P (Admission)        Date:  6/15/2020    Chief Complaint:  Chronic constipation    Subjective:     History of Present Illness:  Patient is a 27 y.o. being admitted for colonoscopy. PMH:  Past Medical History:   Diagnosis Date    Anemia     no complications after childbirth    Bladder pain     Chronic lower back pain     Chronic pain     back pain-- prn meds    History of kidney stones     with pregnancy- naturally pass    Hypertension     manged with meds    IBS (irritable bowel syndrome)     Meniere's disease     Migraines     pt denies    Palpitations     Paresthesia and pain of right extremity 2018    PONV (postoperative nausea and vomiting)     small amount, does well with antiemetic    Pulmonary embolus ([address]     3 days post-op -- delivery     SVT (supraventricular tachycardia) ([address]      ablation Dr Hasmukh Mart-- recent holter monitor results states no significant arrhythmia 10/2019 signed by Dr Sia Orta [facility]iology    Varicella        PSH:  Past Surgical History:   Procedure Laterality Date    COLONOSCOPY N/A 2017    COLONOSCOPY BMI 30 performed [facility] at [facility]COPY    HX [id number] [facility]   and     umbilical hernia X2    HX HYSTERECTOMY  10/2016    unilateral salping-ooph    HX MYOMECTOMY      HX ROTATOR CUFF REPAIR Right 2014    HX SVT ABLATION  2016    HX SVT ABLATION  02/15/2016    HX TUBAL LIGATION      HX UROLOGICAL  2018    CYSTOSCOPY HYDRODEITENTION OF BLADDER, urethral dilitaion    UT COLONOSCOPY W/BIOPSY SINGLE/MULTIPLE  2017            Allergies: Allergies   Allergen Reactions    Shellfish Derived Hives and Angioedema     Has Epi pen   Allergy began 2020-    Crab legs and shrimp       Home Medications:  Prior to Admission medications    Medication Sig Start Date End Date Taking?  Authorizing Provider   HYDROcodone-acetaminophen (NORCO) 7.5-325 mg per tablet Take 1 Tab by mouth every twelve (12) hours as needed. 5/7/20  Yes Provider, Historical   linaclotide (LINZESS) 290 mcg cap capsule Take 290 mcg by mouth Daily (before breakfast). Yes Other, MD Leandra   ondansetron hcl (ZOFRAN) 8 mg tablet Take 1 Tab by mouth every eight (8) hours as needed. 6/1/20   Provider, Historical   ergocalciferol (ERGOCALCIFEROL) 50,000 unit capsule Take 1 Cap by mouth every seven (7) days. 2/7/19   Erroll Moder, DO   OLMESARTAN-HYDROCHLOROTHIAZIDE PO Take 1 Tab by mouth daily. Unable to provide dosage- will bring on DOS    Provider, Historical   cyclobenzaprine (FLEXERIL) 10 mg tablet TAKE 1 TABLET BY MOUTH 3 TIMES A DAY AS NEEDED FOR SPASM 5/4/18   Joyce Romano, [facility] Medications:  Current Facility-Administered Medications   Medication Dose Route Frequency    lactated Ringers infusion  100 mL/hr IntraVENous CONTINUOUS    famotidine (PF) (PEPCID) 20 mg in 0.9% sodium chloride 10 mL injection  20 mg IntraVENous ONCE PRN    lactated Ringers infusion  100 mL/hr IntraVENous CONTINUOUS       Social History:  Social History     Tobacco Use    Smoking status: Never Smoker    Smokeless tobacco: Never Used   Substance Use Topics    Alcohol use: Yes     Comment: Occ         Family History:  Family History   Problem Relation Age of Onset    Ulcerative Colitis Mother         also Castleman's disease    No Known Problems Father     Crohn's Disease Sister         pt reports Crohn's and UC (?)    Crohn's Disease Maternal Grandmother     Colon Cancer Maternal Grandfather        Review of Systems:  A detailed 10 system ROS is obtained, with pertinent positives as listed above. All others are negative.     Objective:     Physical Exam:  Vitals:  Visit Vitals  /65   Pulse 75   Temp 98.5 °F (36.9 °C)   Resp 18   LMP 09/04/2013   SpO2 100%     Gen:  Pt is alert, cooperative, no acute distress  Skin: no large lesions  HEENT: MMM  Cardiovascular: Regular rate and rhythm. No murmurs, gallops, or rubs. Respiratory:  Comfortable breathing with no accessory muscle use. Clear breath sounds with no wheezes, rales, or rhonchi. GI:  Abdomen nondistended, soft, and nontender. Normal active bowel sounds. Neurological:  Gross memory appears intact. Patient is alert and oriented. Psychiatric:  Mood appears appropriate with judgement intact. Laboratory:    No results for input(s): WBC, HGB, HCT, PLT, MCV, NA, K, CL, CO2, BUN, CREA, CA, MG, GLU, AP, TBIL, CBIL, ALB, TP, AML, LPSE, PTP, INR, APTT, HGBEXT, HCTEXT, PLTEXT, INREXT in the last 72 hours. No lab exists for component: SGOT, GPT, DBIL    Assessment:       Active Problems:    * No active hospital problems. *      Plan:     Endoscopy and risks explained to the patient. Risks including reaction to sedation, cardiopulmonary events, infection, bleeding, perforation, requirement for surgery if complications should occur, death were explained to patient who expressed understanding and agreed to proceed with endoscopy.         Ronen Page MD  [facility], [place]

## 2020-06-15 NOTE — DISCHARGE INSTRUCTIONS
Gastrointestinal Colonoscopy/Flexible Sigmoidoscopy - Lower Exam Discharge Instructions  1. Call Dr. [facility] at [facility] for any problems or questions. 2. Contact the doctors office for follow up appointment as directed  3. Medication may cause drowsiness for several hours, therefore:  · Do not drive or operate machinery for reminder of the day. · No alcohol today. · Do not make any important or legal decisions for 24 hours. · Do not sign any legal documents for 24 hours. 4. Ordinarily, you may resume regular diet and activity after exam unless otherwise specified by your physician. 5. Because of air put into your colon during exam, you may experience some abdominal distension, relieved by the passage of gas, for several hours. 6. Contact your physician if you have any of the following:  · Excessive amount of bleeding - large amount when having a bowel movement. Occasional specks of blood with bowel movement would not be unusual.  · Severe abdominal pain  · Fever or Chills    Any additional instructions:  ASSESSMENT/PLAN:  1. Continue Linzess, Miralax and Benefiber  2. Dulcolax PRN  3. Follow with me in the office      Instructions given to Migueon Eric and other family members.   Instructions given by:

## 2020-06-15 NOTE — ANESTHESIA PREPROCEDURE EVALUATION
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)   GI/Hepatic/Renal         Renal disease: stones      Comments: IBS Endo/Other  Within defined limits           Other Findings              Physical Exam    Airway  Mallampati: II  TM Distance: 4 - 6 cm  Neck ROM: normal range of motion   Mouth opening: Normal     Cardiovascular  Regular rate and rhythm,  S1 and S2 normal,  no murmur, click, rub, or gallop  Rhythm: regular  Rate: normal         Dental  No notable dental hx       Pulmonary  Breath sounds clear to auscultation               Abdominal         Other Findings            Anesthetic Plan    ASA: 2  Anesthesia type: total IV anesthesia          Induction: Intravenous  Anesthetic plan and risks discussed with: Patient

## 2020-06-15 NOTE — ANESTHESIA POSTPROCEDURE EVALUATION
Procedure(s):  COLONOSCOPY/BMI 33  COLON BIOPSY. total IV anesthesia    Anesthesia Post Evaluation      Multimodal analgesia: multimodal analgesia not used between 6 hours prior to anesthesia start to PACU discharge  Patient location during evaluation: bedside  Patient participation: complete - patient participated  Level of consciousness: awake and alert  Pain management: adequate  Airway patency: patent  Anesthetic complications: no  Cardiovascular status: hemodynamically stable  Respiratory status: spontaneous ventilation  Hydration status: euvolemic  Comments: Patient stable and may discharge at this time. Post anesthesia nausea and vomiting:  none  Final Post Anesthesia Temperature Assessment:  Normothermia (36.0-37.5 degrees C)      INITIAL Post-op Vital signs:   Vitals Value Taken Time   /81 6/15/2020  9:49 AM   Temp     Pulse 74 6/15/2020  9:52 AM   Resp 16 6/15/2020  9:40 AM   SpO2 98 % 6/15/2020  9:52 AM   Vitals shown include unvalidated device data.

## 2020-11-10 ENCOUNTER — HOSPITAL ENCOUNTER (OUTPATIENT)
Dept: LAB | Age: 35
Discharge: HOME | End: 2020-11-10
Payer: COMMERCIAL

## 2020-11-10 DIAGNOSIS — R53.82 CHRONIC FATIGUE: ICD-10-CM

## 2020-11-10 LAB
ALBUMIN SERPL-MCNC: 3.8 G/DL (ref 3.5–5)
ALBUMIN/GLOB SERPL: 1 {RATIO} (ref 1.2–3.5)
ALP SERPL-CCNC: 88 U/L (ref 50–136)
ALT SERPL-CCNC: 18 U/L (ref 12–65)
ANION GAP SERPL CALC-SCNC: 2 MMOL/L (ref 7–16)
AST SERPL-CCNC: 8 U/L (ref 15–37)
BASOPHILS # BLD: 0 K/UL (ref 0–0.2)
BASOPHILS NFR BLD: 1 % (ref 0–2)
BILIRUB SERPL-MCNC: 0.2 MG/DL (ref 0.2–1.1)
BUN SERPL-MCNC: 14 MG/DL (ref 6–23)
CALCIUM SERPL-MCNC: 8.6 MG/DL (ref 8.3–10.4)
CHLORIDE SERPL-SCNC: 108 MMOL/L (ref 98–107)
CO2 SERPL-SCNC: 29 MMOL/L (ref 21–32)
CREAT SERPL-MCNC: 0.91 MG/DL (ref 0.6–1)
DIFFERENTIAL METHOD BLD: ABNORMAL
EOSINOPHIL # BLD: 0.1 K/UL (ref 0–0.8)
EOSINOPHIL NFR BLD: 2 % (ref 0.5–7.8)
ERYTHROCYTE [DISTWIDTH] IN BLOOD BY AUTOMATED COUNT: 12.5 % (ref 11.9–14.6)
GLOBULIN SER CALC-MCNC: 3.7 G/DL (ref 2.3–3.5)
GLUCOSE SERPL-MCNC: 83 MG/DL (ref 65–100)
HCT VFR BLD AUTO: 36.9 % (ref 35.8–46.3)
HGB BLD-MCNC: 12.5 G/DL (ref 11.7–15.4)
IMM GRANULOCYTES # BLD AUTO: 0 K/UL (ref 0–0.5)
IMM GRANULOCYTES NFR BLD AUTO: 0 % (ref 0–5)
LYMPHOCYTES # BLD: 3 K/UL (ref 0.5–4.6)
LYMPHOCYTES NFR BLD: 48 % (ref 13–44)
MAGNESIUM SERPL-MCNC: 2.3 MG/DL (ref 1.8–2.4)
MCH RBC QN AUTO: 30 PG (ref 26.1–32.9)
MCHC RBC AUTO-ENTMCNC: 33.9 G/DL (ref 31.4–35)
MCV RBC AUTO: 88.7 FL (ref 79.6–97.8)
MONOCYTES # BLD: 0.6 K/UL (ref 0.1–1.3)
MONOCYTES NFR BLD: 9 % (ref 4–12)
NEUTS SEG # BLD: 2.6 K/UL (ref 1.7–8.2)
NEUTS SEG NFR BLD: 41 % (ref 43–78)
NRBC # BLD: 0 K/UL (ref 0–0.2)
PLATELET # BLD AUTO: 263 K/UL (ref 150–450)
PMV BLD AUTO: 10.2 FL (ref 9.4–12.3)
POTASSIUM SERPL-SCNC: 3.6 MMOL/L (ref 3.5–5.1)
PROT SERPL-MCNC: 7.5 G/DL (ref 6.3–8.2)
RBC # BLD AUTO: 4.16 M/UL (ref 4.05–5.2)
SODIUM SERPL-SCNC: 139 MMOL/L (ref 138–145)
TSH SERPL DL<=0.005 MIU/L-ACNC: 1.18 UIU/ML (ref 0.36–3.74)
WBC # BLD AUTO: 6.2 K/UL (ref 4.3–11.1)

## 2020-11-10 PROCEDURE — 36415 COLL VENOUS BLD VENIPUNCTURE: CPT

## 2020-11-10 PROCEDURE — 80053 COMPREHEN METABOLIC PANEL: CPT

## 2020-11-10 PROCEDURE — 85025 COMPLETE CBC W/AUTO DIFF WBC: CPT

## 2020-11-10 PROCEDURE — 83735 ASSAY OF MAGNESIUM: CPT

## 2020-11-10 PROCEDURE — 84443 ASSAY THYROID STIM HORMONE: CPT

## 2020-11-11 NOTE — PROGRESS NOTES
Please call her, labs are very good. TSH normal, good news. Let's see how she feels on HCTZ. Need to add KCL 10 BID to the HCTZ as well. Call for issues on new med, see me as planned.   Thanks

## 2020-12-09 PROBLEM — Z86.711 HISTORY OF PULMONARY EMBOLUS (PE): Status: ACTIVE | Noted: 2020-12-09

## 2020-12-11 PROBLEM — R07.2 PRECORDIAL PAIN: Chronic | Status: ACTIVE | Noted: 2019-02-06

## 2020-12-11 PROBLEM — R06.02 SHORTNESS OF BREATH: Chronic | Status: ACTIVE | Noted: 2019-02-06

## 2020-12-18 ENCOUNTER — HOSPITAL ENCOUNTER (OUTPATIENT)
Dept: ULTRASOUND IMAGING | Age: 35
Discharge: HOME | End: 2020-12-18
Attending: INTERNAL MEDICINE
Payer: COMMERCIAL

## 2020-12-18 ENCOUNTER — HOSPITAL ENCOUNTER (OUTPATIENT)
Dept: CT IMAGING | Age: 35
Discharge: HOME | End: 2020-12-18
Attending: INTERNAL MEDICINE
Payer: COMMERCIAL

## 2020-12-18 VITALS — DIASTOLIC BLOOD PRESSURE: 79 MMHG | RESPIRATION RATE: 14 BRPM | SYSTOLIC BLOOD PRESSURE: 141 MMHG | HEART RATE: 81 BPM

## 2020-12-18 DIAGNOSIS — R07.2 PRECORDIAL PAIN: ICD-10-CM

## 2020-12-18 DIAGNOSIS — Z86.711 HISTORY OF PULMONARY EMBOLUS (PE): ICD-10-CM

## 2020-12-18 DIAGNOSIS — R06.02 SHORTNESS OF BREATH: ICD-10-CM

## 2020-12-18 LAB — CREAT BLD-MCNC: 0.9 MG/DL (ref 0.8–1.5)

## 2020-12-18 PROCEDURE — 74011250637 HC RX REV CODE- 250/637: Performed by: INTERNAL MEDICINE

## 2020-12-18 PROCEDURE — 74011000258 HC RX REV CODE- 258: Performed by: INTERNAL MEDICINE

## 2020-12-18 PROCEDURE — 74011000636 HC RX REV CODE- 636: Performed by: INTERNAL MEDICINE

## 2020-12-18 PROCEDURE — 82565 ASSAY OF CREATININE: CPT

## 2020-12-18 PROCEDURE — 75574 CT ANGIO HRT W/3D IMAGE: CPT

## 2020-12-18 PROCEDURE — 93970 EXTREMITY STUDY: CPT

## 2020-12-18 RX ORDER — SODIUM CHLORIDE 0.9 % (FLUSH) 0.9 %
10 SYRINGE (ML) INJECTION
Status: COMPLETED | OUTPATIENT
Start: 2020-12-18 | End: 2020-12-18

## 2020-12-18 RX ORDER — NITROGLYCERIN 0.4 MG/1
0.4 TABLET SUBLINGUAL ONCE
Status: COMPLETED | OUTPATIENT
Start: 2020-12-18 | End: 2020-12-18

## 2020-12-18 RX ADMIN — Medication 10 ML: at 08:06

## 2020-12-18 RX ADMIN — IOPAMIDOL 75 ML: 755 INJECTION, SOLUTION INTRAVENOUS at 08:06

## 2020-12-18 RX ADMIN — SODIUM CHLORIDE 100 ML: 900 INJECTION, SOLUTION INTRAVENOUS at 08:06

## 2020-12-18 RX ADMIN — NITROGLYCERIN 0.4 MG: 0.4 TABLET SUBLINGUAL at 08:16

## 2021-07-20 ENCOUNTER — HOSPITAL ENCOUNTER (OUTPATIENT)
Dept: CT IMAGING | Age: 36
Discharge: HOME | End: 2021-07-20
Attending: NURSE PRACTITIONER

## 2021-07-20 DIAGNOSIS — R31.0 GROSS HEMATURIA: ICD-10-CM

## 2021-07-20 DIAGNOSIS — N20.0 RENAL STONE: ICD-10-CM

## 2021-07-20 DIAGNOSIS — R39.89 BLADDER PAIN: ICD-10-CM

## 2021-07-20 RX ORDER — SODIUM CHLORIDE 0.9 % (FLUSH) 0.9 %
10 SYRINGE (ML) INJECTION
Status: COMPLETED | OUTPATIENT
Start: 2021-07-20 | End: 2021-07-20

## 2021-07-20 RX ADMIN — Medication 10 ML: at 13:59

## 2021-07-26 ENCOUNTER — ANESTHESIA EVENT (OUTPATIENT)
Dept: SURGERY | Age: 36
End: 2021-07-26
Payer: COMMERCIAL

## 2021-07-27 ENCOUNTER — HOSPITAL ENCOUNTER (OUTPATIENT)
Age: 36
Setting detail: OUTPATIENT SURGERY
Discharge: HOME | End: 2021-07-27
Attending: UROLOGY | Admitting: UROLOGY
Payer: COMMERCIAL

## 2021-07-27 ENCOUNTER — ANESTHESIA (OUTPATIENT)
Dept: SURGERY | Age: 36
End: 2021-07-27
Payer: COMMERCIAL

## 2021-07-27 VITALS
TEMPERATURE: 98 F | DIASTOLIC BLOOD PRESSURE: 69 MMHG | RESPIRATION RATE: 18 BRPM | HEART RATE: 85 BPM | SYSTOLIC BLOOD PRESSURE: 141 MMHG | OXYGEN SATURATION: 98 %

## 2021-07-27 DIAGNOSIS — N30.10 INTERSTITIAL CYSTITIS: ICD-10-CM

## 2021-07-27 LAB
ANION GAP SERPL CALC-SCNC: 6 MMOL/L (ref 7–16)
BUN SERPL-MCNC: 10 MG/DL (ref 6–23)
CALCIUM SERPL-MCNC: 9 MG/DL (ref 8.3–10.4)
CHLORIDE SERPL-SCNC: 109 MMOL/L (ref 98–107)
CO2 SERPL-SCNC: 27 MMOL/L (ref 21–32)
COVID-19 RAPID TEST, COVR: NOT DETECTED
CREAT SERPL-MCNC: 0.74 MG/DL (ref 0.6–1)
ERYTHROCYTE [DISTWIDTH] IN BLOOD BY AUTOMATED COUNT: 12.1 % (ref 11.9–14.6)
GLUCOSE SERPL-MCNC: 81 MG/DL (ref 65–100)
HCT VFR BLD AUTO: 40.9 % (ref 35.8–46.3)
HGB BLD-MCNC: 13.8 G/DL (ref 11.7–15.4)
MCH RBC QN AUTO: 28.8 PG (ref 26.1–32.9)
MCHC RBC AUTO-ENTMCNC: 33.7 G/DL (ref 31.4–35)
MCV RBC AUTO: 85.2 FL (ref 79.6–97.8)
NRBC # BLD: 0 K/UL (ref 0–0.2)
PLATELET # BLD AUTO: 274 K/UL (ref 150–450)
PMV BLD AUTO: 9.7 FL (ref 9.4–12.3)
POTASSIUM BLD-SCNC: 3.3 MMOL/L (ref 3.5–5.1)
POTASSIUM SERPL-SCNC: 3.2 MMOL/L (ref 3.5–5.1)
RBC # BLD AUTO: 4.8 M/UL (ref 4.05–5.2)
SODIUM SERPL-SCNC: 142 MMOL/L (ref 136–145)
SOURCE, COVRS: NORMAL
WBC # BLD AUTO: 4.9 K/UL (ref 4.3–11.1)

## 2021-07-27 PROCEDURE — 74011250636 HC RX REV CODE- 250/636: Performed by: REGISTERED NURSE

## 2021-07-27 PROCEDURE — 52260 CYSTOSCOPY AND TREATMENT: CPT | Performed by: UROLOGY

## 2021-07-27 PROCEDURE — 77030019927 HC TBNG IRR CYSTO BAXT -A: Performed by: UROLOGY

## 2021-07-27 PROCEDURE — 84132 ASSAY OF SERUM POTASSIUM: CPT

## 2021-07-27 PROCEDURE — 76010000138 HC OR TIME 0.5 TO 1 HR: Performed by: UROLOGY

## 2021-07-27 PROCEDURE — 2709999900 HC NON-CHARGEABLE SUPPLY: Performed by: UROLOGY

## 2021-07-27 PROCEDURE — 77030040361 HC SLV COMPR DVT MDII -B: Performed by: UROLOGY

## 2021-07-27 PROCEDURE — 74011250636 HC RX REV CODE- 250/636: Performed by: UROLOGY

## 2021-07-27 PROCEDURE — 74011250637 HC RX REV CODE- 250/637: Performed by: UROLOGY

## 2021-07-27 PROCEDURE — 74011000250 HC RX REV CODE- 250: Performed by: REGISTERED NURSE

## 2021-07-27 PROCEDURE — 74011000250 HC RX REV CODE- 250: Performed by: UROLOGY

## 2021-07-27 PROCEDURE — 76210000016 HC OR PH I REC 1 TO 1.5 HR: Performed by: UROLOGY

## 2021-07-27 PROCEDURE — 76060000032 HC ANESTHESIA 0.5 TO 1 HR: Performed by: UROLOGY

## 2021-07-27 PROCEDURE — 76210000021 HC REC RM PH II 0.5 TO 1 HR: Performed by: UROLOGY

## 2021-07-27 PROCEDURE — 80048 BASIC METABOLIC PNL TOTAL CA: CPT

## 2021-07-27 PROCEDURE — 77030019908 HC STETH ESOPH SIMS -A: Performed by: ANESTHESIOLOGY

## 2021-07-27 PROCEDURE — 87635 SARS-COV-2 COVID-19 AMP PRB: CPT

## 2021-07-27 PROCEDURE — 77030010509 HC AIRWY LMA MSK TELE -A: Performed by: ANESTHESIOLOGY

## 2021-07-27 PROCEDURE — 88112 CYTOPATH CELL ENHANCE TECH: CPT

## 2021-07-27 PROCEDURE — 74011250636 HC RX REV CODE- 250/636: Performed by: ANESTHESIOLOGY

## 2021-07-27 PROCEDURE — 85027 COMPLETE CBC AUTOMATED: CPT

## 2021-07-27 PROCEDURE — 74011000250 HC RX REV CODE- 250

## 2021-07-27 PROCEDURE — 77030040922 HC BLNKT HYPOTHRM STRY -A: Performed by: ANESTHESIOLOGY

## 2021-07-27 PROCEDURE — 74011250637 HC RX REV CODE- 250/637: Performed by: ANESTHESIOLOGY

## 2021-07-27 RX ORDER — LORAZEPAM 2 MG/ML
0.5 INJECTION INTRAMUSCULAR
Status: COMPLETED | OUTPATIENT
Start: 2021-07-27 | End: 2021-07-27

## 2021-07-27 RX ORDER — LIDOCAINE HYDROCHLORIDE 20 MG/ML
INJECTION, SOLUTION EPIDURAL; INFILTRATION; INTRACAUDAL; PERINEURAL AS NEEDED
Status: DISCONTINUED | OUTPATIENT
Start: 2021-07-27 | End: 2021-07-27 | Stop reason: HOSPADM

## 2021-07-27 RX ORDER — SODIUM CHLORIDE, SODIUM LACTATE, POTASSIUM CHLORIDE, CALCIUM CHLORIDE 600; 310; 30; 20 MG/100ML; MG/100ML; MG/100ML; MG/100ML
75 INJECTION, SOLUTION INTRAVENOUS CONTINUOUS
Status: DISCONTINUED | OUTPATIENT
Start: 2021-07-27 | End: 2021-07-27 | Stop reason: HOSPADM

## 2021-07-27 RX ORDER — MIDAZOLAM HYDROCHLORIDE 1 MG/ML
2 INJECTION, SOLUTION INTRAMUSCULAR; INTRAVENOUS
Status: COMPLETED | OUTPATIENT
Start: 2021-07-27 | End: 2021-07-27

## 2021-07-27 RX ORDER — ONDANSETRON 2 MG/ML
INJECTION INTRAMUSCULAR; INTRAVENOUS AS NEEDED
Status: DISCONTINUED | OUTPATIENT
Start: 2021-07-27 | End: 2021-07-27 | Stop reason: HOSPADM

## 2021-07-27 RX ORDER — FENTANYL CITRATE 50 UG/ML
INJECTION, SOLUTION INTRAMUSCULAR; INTRAVENOUS AS NEEDED
Status: DISCONTINUED | OUTPATIENT
Start: 2021-07-27 | End: 2021-07-27 | Stop reason: HOSPADM

## 2021-07-27 RX ORDER — HYDROCODONE BITARTRATE AND ACETAMINOPHEN 10; 325 MG/1; MG/1
1 TABLET ORAL
Qty: 15 TABLET | Refills: 0 | Status: SHIPPED | OUTPATIENT
Start: 2021-07-27 | End: 2021-08-01

## 2021-07-27 RX ORDER — PHENAZOPYRIDINE HYDROCHLORIDE 95 MG/1
190 TABLET ORAL
Status: COMPLETED | OUTPATIENT
Start: 2021-07-27 | End: 2021-07-27

## 2021-07-27 RX ORDER — PHENAZOPYRIDINE HYDROCHLORIDE 200 MG/1
200 TABLET, FILM COATED ORAL
Qty: 9 TABLET | Refills: 1 | Status: SHIPPED | OUTPATIENT
Start: 2021-07-27 | End: 2021-07-30

## 2021-07-27 RX ORDER — OXYCODONE HYDROCHLORIDE 5 MG/1
5 TABLET ORAL
Status: DISCONTINUED | OUTPATIENT
Start: 2021-07-27 | End: 2021-07-27 | Stop reason: HOSPADM

## 2021-07-27 RX ORDER — CEFAZOLIN SODIUM/WATER 2 G/20 ML
2 SYRINGE (ML) INTRAVENOUS ONCE
Status: COMPLETED | OUTPATIENT
Start: 2021-07-27 | End: 2021-07-27

## 2021-07-27 RX ORDER — HYDROMORPHONE HYDROCHLORIDE 2 MG/ML
0.5 INJECTION, SOLUTION INTRAMUSCULAR; INTRAVENOUS; SUBCUTANEOUS
Status: DISCONTINUED | OUTPATIENT
Start: 2021-07-27 | End: 2021-07-27 | Stop reason: HOSPADM

## 2021-07-27 RX ORDER — ACETAMINOPHEN 500 MG
1000 TABLET ORAL ONCE
Status: COMPLETED | OUTPATIENT
Start: 2021-07-27 | End: 2021-07-27

## 2021-07-27 RX ORDER — BUPIVACAINE HYDROCHLORIDE 2.5 MG/ML
INJECTION, SOLUTION EPIDURAL; INFILTRATION; INTRACAUDAL AS NEEDED
Status: DISCONTINUED | OUTPATIENT
Start: 2021-07-27 | End: 2021-07-27 | Stop reason: HOSPADM

## 2021-07-27 RX ORDER — ATROPA BELLADONNA AND OPIUM 16.2; 6 MG/1; MG/1
SUPPOSITORY RECTAL AS NEEDED
Status: DISCONTINUED | OUTPATIENT
Start: 2021-07-27 | End: 2021-07-27 | Stop reason: HOSPADM

## 2021-07-27 RX ORDER — SODIUM CHLORIDE 0.9 % (FLUSH) 0.9 %
5-40 SYRINGE (ML) INJECTION AS NEEDED
Status: DISCONTINUED | OUTPATIENT
Start: 2021-07-27 | End: 2021-07-27 | Stop reason: HOSPADM

## 2021-07-27 RX ORDER — DEXAMETHASONE SODIUM PHOSPHATE 4 MG/ML
INJECTION, SOLUTION INTRA-ARTICULAR; INTRALESIONAL; INTRAMUSCULAR; INTRAVENOUS; SOFT TISSUE AS NEEDED
Status: DISCONTINUED | OUTPATIENT
Start: 2021-07-27 | End: 2021-07-27 | Stop reason: HOSPADM

## 2021-07-27 RX ORDER — SODIUM CHLORIDE 0.9 % (FLUSH) 0.9 %
5-40 SYRINGE (ML) INJECTION EVERY 8 HOURS
Status: DISCONTINUED | OUTPATIENT
Start: 2021-07-27 | End: 2021-07-27 | Stop reason: HOSPADM

## 2021-07-27 RX ORDER — OXYCODONE AND ACETAMINOPHEN 10; 325 MG/1; MG/1
1 TABLET ORAL AS NEEDED
Status: DISCONTINUED | OUTPATIENT
Start: 2021-07-27 | End: 2021-07-27 | Stop reason: HOSPADM

## 2021-07-27 RX ORDER — HYOSCYAMINE SULFATE 0.12 MG/1
0.12 TABLET SUBLINGUAL
Qty: 30 TABLET | Refills: 1 | Status: SHIPPED | OUTPATIENT
Start: 2021-07-27

## 2021-07-27 RX ORDER — LIDOCAINE HYDROCHLORIDE 20 MG/ML
JELLY TOPICAL AS NEEDED
Status: DISCONTINUED | OUTPATIENT
Start: 2021-07-27 | End: 2021-07-27 | Stop reason: HOSPADM

## 2021-07-27 RX ORDER — PROPOFOL 10 MG/ML
INJECTION, EMULSION INTRAVENOUS AS NEEDED
Status: DISCONTINUED | OUTPATIENT
Start: 2021-07-27 | End: 2021-07-27 | Stop reason: HOSPADM

## 2021-07-27 RX ADMIN — ACETAMINOPHEN 1000 MG: 500 TABLET ORAL at 13:33

## 2021-07-27 RX ADMIN — OXYCODONE HYDROCHLORIDE AND ACETAMINOPHEN 1 TABLET: 10; 325 TABLET ORAL at 17:20

## 2021-07-27 RX ADMIN — FENTANYL CITRATE 50 MCG: 50 INJECTION INTRAMUSCULAR; INTRAVENOUS at 15:46

## 2021-07-27 RX ADMIN — ONDANSETRON 4 MG: 2 INJECTION INTRAMUSCULAR; INTRAVENOUS at 16:08

## 2021-07-27 RX ADMIN — LORAZEPAM 0.5 MG: 2 INJECTION INTRAMUSCULAR; INTRAVENOUS at 17:02

## 2021-07-27 RX ADMIN — FENTANYL CITRATE 50 MCG: 50 INJECTION INTRAMUSCULAR; INTRAVENOUS at 16:01

## 2021-07-27 RX ADMIN — LIDOCAINE HYDROCHLORIDE 100 MG: 20 INJECTION, SOLUTION EPIDURAL; INFILTRATION; INTRACAUDAL; PERINEURAL at 15:47

## 2021-07-27 RX ADMIN — HYDROMORPHONE HYDROCHLORIDE 0.5 MG: 2 INJECTION INTRAMUSCULAR; INTRAVENOUS; SUBCUTANEOUS at 16:22

## 2021-07-27 RX ADMIN — MIDAZOLAM 2 MG: 1 INJECTION INTRAMUSCULAR; INTRAVENOUS at 13:50

## 2021-07-27 RX ADMIN — CEFAZOLIN 2 G: 1 INJECTION, POWDER, FOR SOLUTION INTRAVENOUS at 15:55

## 2021-07-27 RX ADMIN — LORAZEPAM 0.5 MG: 2 INJECTION INTRAMUSCULAR; INTRAVENOUS at 16:46

## 2021-07-27 RX ADMIN — DEXAMETHASONE SODIUM PHOSPHATE 10 MG: 4 INJECTION, SOLUTION INTRAMUSCULAR; INTRAVENOUS at 15:54

## 2021-07-27 RX ADMIN — HYDROMORPHONE HYDROCHLORIDE 0.5 MG: 2 INJECTION INTRAMUSCULAR; INTRAVENOUS; SUBCUTANEOUS at 16:40

## 2021-07-27 RX ADMIN — URINARY PAIN RELIEF 190 MG: 95 TABLET ORAL at 18:03

## 2021-07-27 RX ADMIN — PROPOFOL 200 MG: 10 INJECTION, EMULSION INTRAVENOUS at 15:47

## 2021-07-27 RX ADMIN — SODIUM CHLORIDE, SODIUM LACTATE, POTASSIUM CHLORIDE, AND CALCIUM CHLORIDE 75 ML/HR: 600; 310; 30; 20 INJECTION, SOLUTION INTRAVENOUS at 13:29

## 2021-07-27 NOTE — ANESTHESIA PREPROCEDURE EVALUATION
Relevant Problems   ANESTHESIA   (+) PONV (postoperative nausea and vomiting)      RESPIRATORY SYSTEM   (+) Shortness of breath      CARDIOVASCULAR   (+) Arrhythmia   (+) Hypertension   (+) SVT (supraventricular tachycardia) (HCC)      RENAL FAILURE   (+) Kidney stones      HEMATOLOGY   (+) Anemia       Anesthetic History     PONV          Review of Systems / Medical History  Patient summary reviewed and pertinent labs reviewed    Pulmonary                Comments: Hx of PE after C/S   Neuro/Psych              Cardiovascular    Hypertension        Dysrhythmias : SVT      Exercise tolerance: >4 METS  Comments: S/p ablation 2019   GI/Hepatic/Renal         Renal disease: stones      Comments: IBS Endo/Other        Obesity     Other Findings              Physical Exam    Airway  Mallampati: II  TM Distance: 4 - 6 cm  Neck ROM: normal range of motion   Mouth opening: Normal     Cardiovascular    Rhythm: regular           Dental      Comments: retainer   Pulmonary                 Abdominal  GI exam deferred       Other Findings            Anesthetic Plan    ASA: 3  Anesthesia type: general          Induction: Intravenous  Anesthetic plan and risks discussed with: Patient

## 2021-07-27 NOTE — PERIOP NOTES
[id number]: Patient tearful, anxious, restless legs. States, \"I'm scared, but I don't know why. My chest feels tight. \" VSS. Lung fields clear, no EKG changes. Kandace Auguste MD called. New order received.

## 2021-07-27 NOTE — PERIOP NOTES
Discharge instructions reviewed with patient and her spouse, Huy Hansen. Both verbalized understanding. Questions answered, concerns addressed. Papers with her . Prescriptions send electronically to preferred pharmacy. Unable to use e-signature pad d/t malfunction.

## 2021-07-27 NOTE — PERIOP NOTES
Patient getting dressed with assistance from her , Ryan Hu. Transferred to wheelchair and taken to bathroom for urge to void.

## 2021-07-27 NOTE — PERIOP NOTES
2131-3327: Patient has calmed down with her  at bedside. Tears and restless leg movements have stopped. Verbalizes improvement with fear and anxiety.

## 2021-07-27 NOTE — PERIOP NOTES
Patient requesting her  at bedside. States, \"I still don't know why I'm scared. \" Tearful. Appears to be slightly less anxious after initial Ativan dose.

## 2021-07-27 NOTE — ANESTHESIA POSTPROCEDURE EVALUATION
Procedure(s):  CYSTOSCOPY W/ HYDRODISTENSION. general    Anesthesia Post Evaluation      Multimodal analgesia: multimodal analgesia used between 6 hours prior to anesthesia start to PACU discharge  Patient location during evaluation: PACU  Patient participation: complete - patient participated  Level of consciousness: awake  Pain management: adequate  Airway patency: patent  Anesthetic complications: no  Cardiovascular status: acceptable  Respiratory status: acceptable  Hydration status: acceptable  Post anesthesia nausea and vomiting:  none  Final Post Anesthesia Temperature Assessment:  Normothermia (36.0-37.5 degrees C)      INITIAL Post-op Vital signs:   Vitals Value Taken Time   /77 07/27/21 1703   Temp 36.2 °C (97.2 °F) 07/27/21 1620   Pulse 90 07/27/21 1725   Resp 20 07/27/21 1635   SpO2 99 % 07/27/21 1710   Vitals shown include unvalidated device data.

## 2021-07-27 NOTE — DISCHARGE INSTRUCTIONS
You will be called to schedule follow up in about 6 weeks. Please call my office at [phone] if you develop fever > 100.5, uncontrolled pain, nausea, vomiting, inability to urinate, other symptoms/concerns. ------------------------------------------------------    Bladder Augmentation: What to Expect at Home  Your Recovery  Bladder augmentation is surgery to make the bladder larger and improve its ability to stretch. After surgery, your bladder should be able to hold more urine. After surgery, you may feel weak and tired at first. You will probably feel some pain or cramping in your lower belly and need pain medicine for a week or two. Try to avoid heavy lifting and strenuous activities that might put extra pressure on your bladder while you recover. This care sheet gives you a general idea about how long it will take for you to recover. But each person recovers at a different pace. Follow the steps below to get better as quickly as possible. How can you care for yourself at home? Activity  · Rest when you feel tired. Getting enough sleep will help you recover. · Try to walk each day. Start by walking a little more than you did the day before. Bit by bit, increase the amount you walk. Walking boosts blood flow and helps prevent pneumonia and constipation. · Avoid strenuous activities, such as bicycle riding, jogging, weight lifting, or aerobic exercise, for 6 to 8 weeks or until your doctor says it is okay. · For 6 to 8 weeks or until your doctor says it is okay, avoid lifting anything that would make you strain. This may include a child, heavy grocery bags and milk containers, a heavy briefcase or backpack, cat litter or dog food bags, or a vacuum . · Ask your doctor when you can drive again. · You will probably need to take 72 hours off from work. It depends on the type of work you do and how you feel. · You may shower as usual. Pat the cut (incision) dry.  Do not take a bath until your doctor tells you it is okay. · Ask your doctor when it is okay for you to have sex. Diet  1. You can eat your normal diet. If your stomach is upset, try bland, low-fat foods like plain rice, broiled chicken, toast, and yogurt. 2. Drink plenty of fluids (unless your doctor tells you not to). 3. You may notice that your bowel movements are not regular right after your surgery. This is common. Try to avoid constipation and straining with bowel movements. You may want to take a fiber supplement every day. If you have not had a bowel movement after a couple of days, ask your doctor about taking a mild laxative. Medicines  1. Your doctor will tell you if and when you can restart your medicines. He or she will also give you instructions about taking any new medicines. 2. If you take blood thinners, such as warfarin (Coumadin), clopidogrel (Plavix), or aspirin, be sure to talk to your doctor. He or she will tell you if and when to start taking those medicines again. Make sure that you understand exactly what your doctor wants you to do. 3. Be safe with medicines. Take pain medicines exactly as directed. 1. If the doctor gave you a prescription medicine for pain, take it as prescribed. 2. If you are not taking a prescription pain medicine, ask your doctor if you can take an over-the-counter medicine. 4. If you think your pain medicine is making you sick to your stomach:  1. Take your medicine after meals (unless your doctor has told you not to). 2. Ask your doctor for a different pain medicine. 5. If your doctor prescribed antibiotics, take them as directed. Do not stop taking them just because you feel better. You need to take the full course of antibiotics. Medication Interaction:  During your procedure you potentially received a medication or medications which may reduce the effectiveness of oral contraceptives.  Please consider other forms of contraception for 1 month following your procedure if you are currently using oral contraceptives as your primary form of birth control. In addition to this, we recommend continuing your oral contraceptive as prescribed, unless otherwise instructed by your physician, during this time. Other instructions  · If your doctor has told you to flush your bladder, follow his or her instructions on how to do this. Follow-up care is a key part of your treatment and safety. Be sure to make and go to all appointments, and call your doctor if you are having problems. It's also a good idea to know your test results and keep a list of the medicines you take. When should you call for help? Call 911 anytime you think you may need emergency care. For example, call if:  · You passed out (lost consciousness). · You have severe trouble breathing. · You have sudden chest pain and shortness of breath, or you cough up blood. · You have severe pain in your belly. Call your doctor now or seek immediate medical care if:  · You have bright red vaginal bleeding that soaks one or more pads in an hour, or you have large clots. · You are sick to your stomach or cannot keep fluids down. · You have pain that does not get better after you take pain medicine. · You have loose stitches, or your incision comes open. · Your incision is bleeding. · You have vaginal discharge that has increased in amount or smells bad. · Your catheters come out. · Your catheters are not draining urine, even after you flush your bladder. · You have signs of infection, such as:  ¨ Increased pain, swelling, warmth, or redness. ¨ Red streaks leading from the incision. ¨ Pus draining from the incision. ¨ A fever. · You have clots of blood in your urine. · You have trouble passing urine or stool, especially if you have pain or swelling in your lower belly. · You have new or worse pain when you urinate. · You have pain in your back just below your rib cage. This is called flank pain.   Watch closely for changes in your health, and be sure to contact your doctor if:  You do not have a bowel movement after taking a laxative. After general anesthesia or intravenous sedation, for 24 hours or while taking prescription Narcotics:  · Limit your activities  · A responsible adult needs to be with you for the next 24 hours  · Do not drive and operate hazardous machinery  · Do not make important personal or business decisions  · Do not drink alcoholic beverages  · If you have not urinated within 8 hours after discharge, and you are experiencing discomfort from urinary retention, please go to the nearest Emergency Dept. · If you have sleep apnea and have a CPAP machine, please use it for all naps and sleeping. · Please use caution when taking narcotics and any of your home medications that may cause drowsiness. *  Please give a list of your current medications to your Primary Care Provider. *  Please update this list whenever your medications are discontinued, doses are      changed, or new medications (including over-the-counter products) are added. *  Please carry medication information at all times in case of emergency situations. These are general instructions for a healthy lifestyle:  No smoking/ No tobacco products/ Avoid exposure to second hand smoke  Surgeon General's Warning:  Quitting smoking now greatly reduces serious risk to your health. Obesity, smoking, and sedentary lifestyle greatly increases your risk for illness  A healthy diet, regular physical exercise & weight monitoring are important for maintaining a healthy lifestyle    You may be retaining fluid if you have a history of heart failure or if you experience any of the following symptoms:  Weight gain of 3 pounds or more overnight or 5 pounds in a week, increased swelling in our hands or feet or shortness of breath while lying flat in bed. Please call your doctor as soon as you notice any of these symptoms; do not wait until your next office visit.

## 2021-07-27 NOTE — H&P
Update History & Physical    The Patient's History and Physical was reviewed with the patient and I examined the patient. There was no change. The surgical site was confirmed by the patient and me. Plan:  The risk, benefits, expected outcome, and alternative to the recommended procedure have been discussed with the patient. Patient understands and wants to proceed with the procedure. Electronically signed by Julia Delgado MD on 2021 at 1:29 PM    [facility], [address]  [phone]              Eloisa Valera  : 1985         Chief Complaint   Patient presents with   Candace Binu Hematuria            HPI      Eloisa Valera is a 38 y.o. female     previously followed by Dr Judi Ibarra. Prior to this, she was followed by Shan[facility] Jimenez [facility]. H/O IC, recurrent UTI, and renal stones.     H/O partial hysterectomy.  Has some degenerative disease of the spine. She also has irritable bowels been on Linzess.  She previously tried Elmiron and Myrbetriq wo change in LUTS. She had a CT scan in 2017 did not show any  pathology.       S/P cystoscopy, hydrodistention of the bladder and urethral dilatation (). This helped her sx mildly. She admits to have spaghetti and spicy chicken which have worsened her pain.     She has previously had some relief w instills of the bladder. Gilbert Short has been most helpful in the past. She is out of this medication.     Today she returns with complaints of gross hematuria, rare frequency nocturia and feeling of incomplete emptying. Patient reports she saw gross blood on Monday. Since that time she has been voiding about twice an hour and she is getting up anywhere from 6-8 times a night to void. She only has a small amount of urine coming out. She was evaluated back in May by JESUS Hardy for pelvic/bladder pain. She says that since that appointment her symptoms have been on and off.  She is frustrating and wanting to proceed with further testing.             Past Medical History:   Diagnosis Date    Anemia       no complications after childbirth    Bladder pain      Chronic lower back pain      Chronic pain       back pain-- prn meds    History of kidney stones       with pregnancy- naturally pass    Hypertension       manged with meds    IBS (irritable bowel syndrome)      Meniere's disease      Migraines       pt denies    Palpitations      Paresthesia and pain of right extremity 2018    PONV (postoperative nausea and vomiting)       small amount, does well with antiemetic    Pulmonary embolus ([address]      3 days post-op -- delivery     SVT (supraventricular tachycardia) [facility])       2017 ablation Dr Jesse Schmid-- recent holter monitor results states no significant arrhythmia 10/2019 signed by Dr Wild [facility]    Varicella              Past Surgical History:   Procedure Laterality Date    COLONOSCOPY N/A 2017     COLONOSCOPY BMI 30 performed by Castillo Frederick MD at [facility] ENDOSCOPY    COLONOSCOPY N/A 6/15/2020     COLONOSCOPY/BMI 33 performed by Ale Basilio MD at [facility] ENDOSCOPY    HX  SECTION         X3    [address]    and      umbilical hernia X2    HX HYSTERECTOMY   10/2016     unilateral salping-ooph    HX MYOMECTOMY        HX ROTATOR CUFF REPAIR Right 2014    HX SVT ABLATION   2016    HX SVT ABLATION   02/15/2016    HX TUBAL LIGATION   2015    HX UROLOGICAL   2018     CYSTOSCOPY HYDRODEITENTION OF BLADDER, urethral dilitaion    [place] COLONOSCOPY W/BIOPSY SINGLE/MULTIPLE   2017                   Current Outpatient Medications   Medication Sig Dispense Refill    HYDROcodone-acetaminophen (NORCO)  mg tablet TAKE 1 TABLET BY MOUTH EVERY 8 HOURS        [facility] blue-sod phos-phsal-hyo (UribeL) 118-10-40.8-36 mg cap capsule Take 1 Cap by mouth four (4) times daily as needed for Pain.  4 Cap 5    hydrOXYzine pamoate (VISTARIL) 25 mg capsule Take 1 Cap by mouth nightly as needed (bladder pain). 90 Cap 1    phenazopyridine (Pyridium) 200 mg tablet Take 1 Tab by mouth three (3) times daily as needed for Pain. 30 Tab 0    amLODIPine (NORVASC) 5 mg tablet Take 1 Tab by mouth daily. 30 Tab 5    potassium chloride (KLOR-CON) 10 mEq tablet Take 1 Tab by mouth two (2) times a day. 180 Tab 0    hydroCHLOROthiazide (HYDRODIURIL) 25 mg tablet Take 1 Tab by mouth daily. 90 Tab 3    fluticasone propionate (FLOVENT HFA) 220 mcg/actuation inhaler Take 2 Puffs by inhalation two (2) times a day. 1 Inhaler 3    fluticasone propionate (Flonase Allergy Relief) 50 mcg/actuation nasal spray 2 sprays in each nostril once a day 1 Bottle 6    albuterol sulfate (PROAIR RESPICLICK) 90 mcg/actuation breath activated inhaler Take 2 Puffs by inhalation four (4) times daily. 2 puffs 4 times a day 1 Inhaler 6    ergocalciferol (ERGOCALCIFEROL) 50,000 unit capsule Take 1 Cap by mouth every seven (7) days. 10 Cap 0    linaclotide (LINZESS) 290 mcg cap capsule Take 290 mcg by mouth Daily (before breakfast).                Allergies   Allergen Reactions    Shellfish Derived Hives and Angioedema       Has Epi pen   Allergy began 4/2020-     Crab legs and shrimp      Social History            Socioeconomic History    Marital status:        Spouse name: Not on file    Number of children: Not on file    Years of education: Not on file    Highest education level: Not on file   Occupational History    Not on file   Tobacco Use    Smoking status: Never Smoker    Smokeless tobacco: Never Used   Vaping Use    Vaping Use: Never used   Substance and Sexual Activity    Alcohol use: Yes       Comment:  Occ    Drug use: No    Sexual activity: Yes       Partners: Male       Birth control/protection: None   Other Topics Concern    Not on file   Social History Narrative    Not on file      Social Determinants of Health     Financial Resource Strain:     Difficulty of Paying Living Expenses:    Food Insecurity:     Worried About Running Out of Food in the Last Year:     [address] in the Last Year:    Transportation Needs:     Lack of Transportation (Medical):  Lack of Transportation (Non-Medical):    Physical Activity:     Days of Exercise per Week:     Minutes of Exercise per Session:    Stress:     Feeling of Stress :    Social Connections:     Frequency of Communication with Friends and Family:     Frequency of Social Gatherings with Friends and Family:     Attends Yazidism Services:     Active Member of Clubs or Organizations:     Attends Club or Organization Meetings:     Marital Status:    Intimate Partner Violence:     Fear of Current or Ex-Partner:     Emotionally Abused:     Physically Abused:     Sexually Abused:             Family History   Problem Relation Age of Onset    Ulcerative Colitis Mother           also Castleman's disease    No Known Problems Father      Crohn's Disease Sister           pt reports Crohn's and UC (?)    Crohn's Disease Maternal Grandmother      Colon Cancer Maternal Grandfather           Review of Systems  Constitutional:   Negative for fever. GI:  Negative for nausea. Genitourinary: Positive for hematuria.   Musculoskeletal:  Negative for back pain.        Urinalysis  UA - Dipstick         Results for orders placed or performed in visit on 07/15/21   AMB POC URINALYSIS DIP STICK AUTO W/O MICRO (PGU)     Status: None   Result Value Ref Range Status     Glucose (UA POC) Negative Negative mg/dL Final     Bilirubin (UA POC) Negative Negative Final     Ketones (UA POC) Negative Negative Final     Specific gravity (UA POC) 1.025 1.001 - 1.035 Final     Blood (UA POC) Moderate Negative Final     pH (UA POC) 6.0 4.6 - 8.0 Final     Protein (UA POC) Negative Negative Final     Urobilinogen (POC) 0.2 mg/dL   Final     Nitrites (UA POC) Negative Negative Final     Leukocyte esterase (UA POC) Negative Negative Final   Results for orders placed or performed in visit on 05/01/19   AMB POC URINALYSIS DIP STICK AUTO W/ MICRO (PGU)     Status: None   Result Value Ref Range Status     Glucose (UA POC) Negative Negative mg/dL Final     Bilirubin (UA POC) Negative Negative Final     Ketones (UA POC) Negative Negative Final     Specific gravity (UA POC) 1.020 1.001 - 1.035 Final     Blood (UA POC) Trace Negative Final     pH (UA POC) 7.5 4.6 - 8.0 Final     Protein (UA POC) Negative Negative Final     Urobilinogen (POC) 0.2 mg/dL   Final     Nitrites (UA POC) Negative Negative Final     Leukocyte esterase (UA POC) Negative Negative Final         UA - Micro  WBC - 0  RBC - 0  Bacteria - 0  Epith - 0     PHYSICAL EXAM        GENERAL: No acute distress, Awake, Alert, Oriented X 3, Gait normal  CHEST AND LUNG: Easy work of breathing, clear to auscultation bilaterally, no cyanosis  CARDIAC: regular rate and rhythm  ABDOMEN: soft, non tender, non-distended, positive bowel sounds, no organomegaly, no palpable masses, no guarding, no rebound tenderness  SKIN: No rash, no erythema, no lacerations or abrasions, no ecchymosis  MUSCULOSKELETAL- normal gait and station.         Assessment and Plan      ICD-10-CM ICD-9-CM     1. Gross hematuria  R31.0 599.71 AMB POC URINALYSIS DIP STICK AUTO W/O MICRO (PGU)   2. Interstitial cystitis  N30.10 595. 1     3. Bladder pain  R39.89 788.99     4. Renal stone  N20.0 592.0        PLAN:  Due to the gross hematuria we will proceed with work-up. We'll do a CT abdomen pelvis with and without contrast first. We will then proceed with cystoscopy/bladder hydrodistention. I will call her CT results.     SANDOVAL Ashley Dr. is supervising physician today and he approves plan of care.     Follow-up and Dispositions    · Return for after testing.         Elements of this note have been dictated using speech recognition software.   Although reviewed, errors of speech recognition may have occurred.      ADDENDUM:  CT is normal  Patient would like to proceed with cystoscopy hydrodistention. Surgery scheduler will call to arrange time.     CYSTOSCOPY  With bladder hydrodistention. INFORMED CONSENT: The nature of the cystoscopy and its purpose were discussed with the patient. Alternative testing (or no testing) was reviewed. Risks include, but are not limited to: bleeding, infection, perforation or tear (urethra, bladder, or ureter), difficulty voiding and urinary retention requiring catheterization and side-effects from anesthesia. The benefits are judged to outweigh the risks and no guarantees of success or outcome were given or implied.   No further questions

## 2021-07-27 NOTE — BRIEF OP NOTE
Brief Postoperative Note    Patient: Candie Suazo  YOB: 1985  MRN: [id number]    Date of Procedure: 7/27/2021     Pre-Op Diagnosis: Interstitial cystitis [N30.10]    Post-Op Diagnosis: Same as preoperative diagnosis. Procedure(s):  CYSTOSCOPY W/ HYDRODISTENSION    Surgeon(s): Elaine Granados MD    Surgical Assistant: None    Anesthesia: General     Estimated Blood Loss (mL): Minimal    Complications: None    Specimens:   ID Type Source Tests Collected by Time Destination   1 : urine Fresh Urine, Cystoscopy  Elaine Granados MD 7/27/2021 1559 Cytology        Implants: * No implants in log *    Drains: * No LDAs found *    Findings: Multiple petechial hemorrhages throughout bladder consistent with IC diagnosis. Bladder capacities 500, 600, 600 respectively on hydro-distensions. Otherwise normal cystoscopy.      Electronically Signed by Ebonie Solomon MD on 7/27/2021 at 4:15 PM

## 2021-07-28 NOTE — PROGRESS NOTES
Isela Micaelatiffani, your urine cytology from your procedure shows no abnormal or cancer cells in it. This is good news.

## 2021-07-28 NOTE — OP NOTES
[facility]  OPERATIVE REPORT    Name:  Berkley Singh  MR#:  [id number]  :  1985  ACCOUNT #:  [de-identified]  DATE OF SERVICE:  2021    PREOPERATIVE DIAGNOSIS:  Interstitial cystitis. POSTOPERATIVE DIAGNOSIS:  Interstitial cystitis. PROCEDURE PERFORMED:  Cystoscopy with hydrodistention of the bladder. SURGEON:  Michelle Saini MD    ASSISTANT:  None. ANESTHESIA:  General.    COMPLICATIONS:  None. SPECIMENS REMOVED:  Urine for cytology. IMPLANTS:  None. ESTIMATED BLOOD LOSS:  Minimal.    DRAINS:  None. FINDINGS:  Multiple petechial hemorrhages throughout the bladder consistent with the diagnosis of IC. Bladder capacities of 500, 600, 600 mL respectively on hydrodistention, otherwise, normal cystoscopy. INDICATIONS FOR OPERATIVE PROCEDURE:  The patient  female with a history of IC relieved by hydrodistention. She has undergone these in the past.  She currently has an IC flare and opted to proceed with a repeat hydrodistention after risk-benefit discussion was had. PROCEDURE:  After informed consent was obtained, the correct patient was identified in the preoperative holding area. She was taken back to the operating suite and placed on the table in the supine position. Time out was performed confirming the correct patient and planned procedure. She received 2 g of IV Ancef prior to the smooth induction of general endotracheal anesthesia. She was moved into dorsal lithotomy position, prepped and draped in the usual sterile fashion. I began the case by inserting a 22-Indonesian rigid cystoscope with a 30-degree lens in the urethra and advanced in the patient's bladder. Pancystoscopy was performed which was unremarkable. The ureteral orifices were in the orthotopic positions bilaterally. I drained the bladder completely and sent the urine off for cytology.   I then inserted my first hydrodistention of the bladder under gravity fill to its maximum capacity. I held this for two minutes and then drained the bladder completely. Her bladder capacity was noted to be 500. I then performed this with additional times and obtained bladder capacities of 600 and 600 mL respectively. She had no Dominick's lesions but had multiple petechial hemorrhages throughout the bladder concerning for significant inflammation from an IC flare. After my hydrodistention were completed, I drained the bladder completely. I administered a B and O suppository. I then instilled 30 mL of Marcaine 0.25% into the bladder and allowed it to dwell for postoperative pain control. She was then awoken from anesthesia and transferred to the PACU in stable condition. She tolerated the procedure well. There were no complications. All counts were correct at the end of procedure.       Srikanth Breen MD      [id number]  D:  07/27/2021 16:23  T:  07/28/2021 2:29  JOB #:  [id number]

## 2021-11-23 ENCOUNTER — HOSPITAL ENCOUNTER (OUTPATIENT)
Dept: LAB | Age: 36
Discharge: HOME | End: 2021-11-23
Payer: COMMERCIAL

## 2021-11-23 DIAGNOSIS — I10 PRIMARY HYPERTENSION: ICD-10-CM

## 2021-11-23 LAB
ANION GAP SERPL CALC-SCNC: 5 MMOL/L (ref 7–16)
BUN SERPL-MCNC: 13 MG/DL (ref 6–23)
CALCIUM SERPL-MCNC: 8.9 MG/DL (ref 8.3–10.4)
CHLORIDE SERPL-SCNC: 110 MMOL/L (ref 98–107)
CO2 SERPL-SCNC: 24 MMOL/L (ref 21–32)
CREAT SERPL-MCNC: 0.98 MG/DL (ref 0.6–1)
GLUCOSE SERPL-MCNC: 119 MG/DL (ref 65–100)
MAGNESIUM SERPL-MCNC: 2.2 MG/DL (ref 1.8–2.4)
POTASSIUM SERPL-SCNC: 3.6 MMOL/L (ref 3.5–5.1)
SODIUM SERPL-SCNC: 139 MMOL/L (ref 136–145)
TSH SERPL DL<=0.005 MIU/L-ACNC: 1.01 UIU/ML (ref 0.36–3.74)

## 2021-11-23 PROCEDURE — 83735 ASSAY OF MAGNESIUM: CPT

## 2021-11-23 PROCEDURE — 36415 COLL VENOUS BLD VENIPUNCTURE: CPT

## 2021-11-23 PROCEDURE — 84443 ASSAY THYROID STIM HORMONE: CPT

## 2021-11-23 PROCEDURE — 80048 BASIC METABOLIC PNL TOTAL CA: CPT

## 2021-12-06 ENCOUNTER — HOSPITAL ENCOUNTER (EMERGENCY)
Age: 36
Discharge: HOME | End: 2021-12-06
Payer: COMMERCIAL

## 2021-12-06 VITALS
TEMPERATURE: 98.4 F | WEIGHT: 187 LBS | DIASTOLIC BLOOD PRESSURE: 87 MMHG | BODY MASS INDEX: 30.05 KG/M2 | SYSTOLIC BLOOD PRESSURE: 145 MMHG | RESPIRATION RATE: 18 BRPM | HEIGHT: 66 IN | OXYGEN SATURATION: 96 % | HEART RATE: 102 BPM

## 2021-12-06 DIAGNOSIS — T78.40XA ALLERGIC REACTION, INITIAL ENCOUNTER: Primary | ICD-10-CM

## 2021-12-06 PROCEDURE — 74011250636 HC RX REV CODE- 250/636

## 2021-12-06 PROCEDURE — 99283 EMERGENCY DEPT VISIT LOW MDM: CPT

## 2021-12-06 PROCEDURE — 74011250637 HC RX REV CODE- 250/637

## 2021-12-06 PROCEDURE — 96374 THER/PROPH/DIAG INJ IV PUSH: CPT

## 2021-12-06 RX ORDER — EPINEPHRINE 0.3 MG/.3ML
0.3 INJECTION SUBCUTANEOUS
Qty: 2 EACH | Refills: 0 | Status: SHIPPED | OUTPATIENT
Start: 2021-12-06 | End: 2021-12-06

## 2021-12-06 RX ORDER — HYDROXYZINE PAMOATE 25 MG/1
25 CAPSULE ORAL
Status: COMPLETED | OUTPATIENT
Start: 2021-12-06 | End: 2021-12-06

## 2021-12-06 RX ORDER — PREDNISONE 50 MG/1
50 TABLET ORAL DAILY
Qty: 3 TABLET | Refills: 0 | Status: SHIPPED | OUTPATIENT
Start: 2021-12-06 | End: 2021-12-09

## 2021-12-06 RX ORDER — FAMOTIDINE 20 MG/1
20 TABLET, FILM COATED ORAL 2 TIMES DAILY
Qty: 20 TABLET | Refills: 0 | Status: SHIPPED | OUTPATIENT
Start: 2021-12-06 | End: 2021-12-16

## 2021-12-06 RX ADMIN — HYDROXYZINE PAMOATE 25 MG: 25 CAPSULE ORAL at 03:18

## 2021-12-06 RX ADMIN — METHYLPREDNISOLONE SODIUM SUCCINATE 125 MG: 125 INJECTION, POWDER, FOR SOLUTION INTRAMUSCULAR; INTRAVENOUS at 03:19

## 2021-12-06 NOTE — ED PROVIDER NOTES
11-year-old female with anaphylactic reaction that at a restaurant. Patient has allergies to shellfish and she went to crab restaurant. She was eating nonshellfish however there may been some cross-contamination. Patient took Benadryl prednisone and give her self an epinephrine shot subcu    The history is provided by the patient. Allergic Reaction   This is a new problem. The current episode started less than 1 hour ago. The problem has been resolved. The ingested item was diphenhydramine. Pertinent negatives include no unusual behavior, no vomiting and no shortness of breath.         Past Medical History:   Diagnosis Date    Anemia     denies hx of blood transfusions    Bladder pain     Chronic lower back pain     Chronic pain     back pain-- prn meds    History of kidney stones     with pregnancy- naturally pass    Hypertension     manged with meds    IBS (irritable bowel syndrome)     Interstitial cystitis     Meniere's disease     Migraines     pt denies    Palpitations     Paresthesia and pain of right extremity 6/12/2018    PONV (postoperative nausea and vomiting)      does well with IV antiemetic    Pulmonary embolus ([address] 2015    3 days  s/p  C/S     SVT (supraventricular tachycardia) ([address]     2016 & 2017 - Now see's Dr Quinton Snyder @ [facility]    Varicella        Past Surgical History:   Procedure Laterality Date    COLONOSCOPY N/A 5/8/2017    COLONOSCOPY BMI 30 performed by Diana Young MD at [facility] N/A 6/15/2020    COLONOSCOPY/BMI 33 performed by Shaneka Lobato MD at [address]  2009 and 98/4372    umbilical hernia X2    HX HYSTERECTOMY  10/2016    unilateral salping-ooph    HX MYOMECTOMY      HX ROTATOR CUFF REPAIR Right 2014    HX SVT ABLATION  02/15/2016    HX SVT ABLATION  2017    HX TUBAL LIGATION  2015    HX UROLOGICAL  12/17/2018    CYSTOSCOPY HYDRODISTENTION OF BLADDER, urethral dilitaion    [place] COLONOSCOPY W/BIOPSY SINGLE/MULTIPLE  5/8/2017              Family History:   Problem Relation Age of Onset    Ulcerative Colitis Mother         also Castleman's disease    No Known Problems Father     Crohn's Disease Sister         pt reports Crohn's and UC (?)    Crohn's Disease Maternal Grandmother     Colon Cancer Maternal Grandfather        Social History     Socioeconomic History    Marital status:      Spouse name: Not on file    Number of children: Not on file    Years of education: Not on file    Highest education level: Not on file   Occupational History    Not on file   Tobacco Use    Smoking status: Never Smoker    Smokeless tobacco: Never Used   Vaping Use    Vaping Use: Never used   Substance and Sexual Activity    Alcohol use: Not Currently    Drug use: No    Sexual activity: Yes     Partners: Male     Birth control/protection: None   Other Topics Concern    Not on file   Social History Narrative    Not on file     Social Determinants of Health     Financial Resource Strain:     Difficulty of Paying Living Expenses: Not on file   Food Insecurity:     Worried About Running Out of Food in the Last Year: Not on file    Rony of Food in the Last Year: Not on file   Transportation Needs:     Lack of Transportation (Medical): Not on file    Lack of Transportation (Non-Medical):  Not on file   Physical Activity:     Days of Exercise per Week: Not on file    Minutes of Exercise per Session: Not on file   Stress:     Feeling of Stress : Not on file   Social Connections:     Frequency of Communication with Friends and Family: Not on file    Frequency of Social Gatherings with Friends and Family: Not on file    Attends Cheondoism Services: Not on file    Active Member of Clubs or Organizations: Not on file    Attends Club or Organization Meetings: Not on file    Marital Status: Not on file   Intimate Partner Violence:     Fear of Current or Ex-Partner: Not on file    Emotionally Abused: Not on file    Physically Abused: Not on file    Sexually Abused: Not on file   Housing Stability:     Unable to Pay for Housing in the Last Year: Not on file    Number of Places Lived in the Last Year: Not on file    Unstable Housing in the Last Year: Not on file         ALLERGIES: Shellfish derived    Review of Systems   Constitutional: Negative. Negative for activity change. HENT: Negative. Eyes: Negative. Respiratory: Negative. Negative for shortness of breath. Cardiovascular: Negative. Gastrointestinal: Negative. Negative for vomiting. Genitourinary: Negative. Musculoskeletal: Negative. Skin: Negative. Neurological: Negative. Psychiatric/Behavioral: Negative. All other systems reviewed and are negative. There were no vitals filed for this visit. Physical Exam  Vitals and nursing note reviewed. Constitutional:       General: She is not in acute distress. Appearance: She is well-developed. HENT:      Head: Normocephalic and atraumatic. Right Ear: External ear normal.      Left Ear: External ear normal.      Nose: Nose normal.   Eyes:      General: No scleral icterus. Right eye: No discharge. Left eye: No discharge. Conjunctiva/sclera: Conjunctivae normal.      Pupils: Pupils are equal, round, and reactive to light. Cardiovascular:      Rate and Rhythm: Regular rhythm. Pulmonary:      Effort: Pulmonary effort is normal. No respiratory distress. Breath sounds: Normal breath sounds. No stridor. No wheezing or rales. Abdominal:      General: Bowel sounds are normal. There is no distension. Palpations: Abdomen is soft. Tenderness: There is no abdominal tenderness. Musculoskeletal:         General: Normal range of motion. Cervical back: Normal range of motion. Skin:     General: Skin is warm and dry. Findings: No rash.    Neurological:      Mental Status: She is alert and oriented to person, place, and time. Motor: No abnormal muscle tone. Coordination: Coordination normal.   Psychiatric:         Behavior: Behavior normal.          MDM  Number of Diagnoses or Management Options  Allergic reaction, initial encounter  Diagnosis management comments: Patient on MS Contin initially evaluated. Having no distress no airway stridor no wheezing no rash. She been given Benadryl by EMS. We will observe her for several hours and this was does not rebound. Patient was watched for 3 hours did not have a rebound was asking to be discharged.        Amount and/or Complexity of Data Reviewed  Decide to obtain previous medical records or to obtain history from someone other than the patient: yes  Review and summarize past medical records: yes    Risk of Complications, Morbidity, and/or Mortality  Presenting problems: moderate  Diagnostic procedures: moderate  Management options: moderate    Patient Progress  Patient progress: stable         Procedures

## 2021-12-06 NOTE — ED NOTES
I have reviewed discharge instructions with the patient. The patient verbalized understanding. Patient left ED via Discharge Method: ambulatory to Home with visitor. Opportunity for questions and clarification provided. Patient given 3 scripts. To continue your aftercare when you leave the hospital, you may receive an automated call from our care team to check in on how you are doing. This is a free service and part of our promise to provide the best care and service to meet your aftercare needs.  If you have questions, or wish to unsubscribe from this service please call [phone]. Thank you for Choosing our [facility] Emergency Department.

## 2021-12-06 NOTE — ED TRIAGE NOTES
Patient arrives via EMS from home with an allergic reaction. Patient has shellfish allergy. States she ate something that may have had contact with shrimp around 2300. Patient took 50mg benadryl, 40mg prednisone, and used an epi pen. EMS obtained IV access. Patient complaining of mild tightness in throat and some itching.

## 2021-12-06 NOTE — Clinical Note
[facility] EMERGENCY DEPT  [address]  [place] [address] [phone]    Work/School Note    Date: 12/6/2021    To Whom It May concern:      Dat Stevens was seen and treated today in the emergency room by the following provider(s):  Attending Provider: Carmen Arechiga MD.      Dat Stevens is excused from work/school on 12/06/21. She is clear to return to work/school on 12/07/21.         Sincerely,          Jordan Lacey MD

## 2022-03-18 PROBLEM — K59.09 CHRONIC CONSTIPATION: Status: ACTIVE | Noted: 2019-05-01

## 2022-03-18 PROBLEM — N94.19 DYSPAREUNIA DUE TO MEDICAL CONDITION IN FEMALE PATIENT: Status: ACTIVE | Noted: 2019-05-01

## 2022-03-18 PROBLEM — R53.82 CHRONIC FATIGUE: Status: ACTIVE | Noted: 2019-02-06

## 2022-03-19 PROBLEM — R06.02 SHORTNESS OF BREATH: Status: ACTIVE | Noted: 2019-02-06

## 2022-03-19 PROBLEM — N30.10 IC (INTERSTITIAL CYSTITIS): Status: ACTIVE | Noted: 2018-12-17

## 2022-03-19 PROBLEM — G56.03 CARPAL TUNNEL SYNDROME, BILATERAL: Status: ACTIVE | Noted: 2018-07-12

## 2022-03-19 PROBLEM — R20.2 PARESTHESIA AND PAIN OF RIGHT EXTREMITY: Status: ACTIVE | Noted: 2018-06-12

## 2022-03-19 PROBLEM — E28.39 ESTROGEN DEFICIENCY: Status: ACTIVE | Noted: 2019-05-01

## 2022-03-19 PROBLEM — Z86.711 HISTORY OF PULMONARY EMBOLUS (PE): Status: ACTIVE | Noted: 2020-12-09

## 2022-03-19 PROBLEM — M79.609 PARESTHESIA AND PAIN OF RIGHT EXTREMITY: Status: ACTIVE | Noted: 2018-06-12

## 2022-03-19 PROBLEM — Z87.42 HISTORY OF ENDOMETRIOSIS: Status: ACTIVE | Noted: 2019-05-01

## 2022-03-19 PROBLEM — N76.0: Status: ACTIVE | Noted: 2019-05-01

## 2022-03-19 PROBLEM — R07.2 PRECORDIAL PAIN: Status: ACTIVE | Noted: 2019-02-06

## 2022-03-20 PROBLEM — M41.57 SCOLIOSIS OF LUMBOSACRAL REGION DUE TO DEGENERATIVE DISEASE OF SPINE IN ADULT: Status: ACTIVE | Noted: 2019-05-01

## 2022-04-04 ENCOUNTER — NURSE TRIAGE (OUTPATIENT)
Dept: OTHER | Facility: CLINIC | Age: 37
End: 2022-04-04

## 2022-04-04 ENCOUNTER — HOSPITAL ENCOUNTER (OUTPATIENT)
Dept: CT IMAGING | Age: 37
Discharge: HOME | End: 2022-04-04
Attending: NURSE PRACTITIONER
Payer: COMMERCIAL

## 2022-04-04 DIAGNOSIS — R40.20 LOC (LOSS OF CONSCIOUSNESS) (HCC): ICD-10-CM

## 2022-04-04 DIAGNOSIS — R51.9 HEADACHE DISORDER: ICD-10-CM

## 2022-04-04 LAB — CREAT BLD-MCNC: 0.9 MG/DL (ref 0.8–1.5)

## 2022-04-04 PROCEDURE — 74011000258 HC RX REV CODE- 258: Performed by: NURSE PRACTITIONER

## 2022-04-04 PROCEDURE — 82565 ASSAY OF CREATININE: CPT

## 2022-04-04 PROCEDURE — 74011000636 HC RX REV CODE- 636: Performed by: NURSE PRACTITIONER

## 2022-04-04 PROCEDURE — 70496 CT ANGIOGRAPHY HEAD: CPT

## 2022-04-04 RX ORDER — SODIUM CHLORIDE 0.9 % (FLUSH) 0.9 %
10 SYRINGE (ML) INJECTION
Status: COMPLETED | OUTPATIENT
Start: 2022-04-04 | End: 2022-04-04

## 2022-04-04 RX ADMIN — SODIUM CHLORIDE 100 ML: 9 INJECTION, SOLUTION INTRAVENOUS at 13:51

## 2022-04-04 RX ADMIN — Medication 10 ML: at 13:51

## 2022-04-04 RX ADMIN — IOPAMIDOL 50 ML: 755 INJECTION, SOLUTION INTRAVENOUS at 13:51

## 2022-04-04 NOTE — TELEPHONE ENCOUNTER
Received call from [facility] with Red Flag Complaint. Subjective: Caller states \"This weekend, I experienced the worst headache ever. My entire head hurt. It was worse than my normal migraines. It came on out of nowhere and felt like thunder and lightening. I had to put my head between my legs and hold my head to get relief. \"     Patient wanting to be evaluated to r/o an aneurysm. \"It just scared me so bad. \"     Pt reports lightheadedness, vision changes, and dizziness during episode. Current Symptoms: denies  Headache currently    Onset: 3 days ago; intermittent    Associated Symptoms: NA    Pain Severity: Denies   Temperature: Denies    What has been tried: migraine meds, Excedrin     LMP: NA Pregnant: No    Recommended disposition: See in Office Today. Pt advised to go to [address] [place], [place] [address]r [facility] if no available appointments. Care advice provided, patient verbalizes understanding; denies any other questions or concerns; instructed to call back for any new or worsening symptoms. Patient/Caller agrees with recommended disposition; writer provided warm transfer to Brenda  [facility] for appointment scheduling    Attention Provider: Thank you for allowing me to participate in the care of your patient. The patient was connected to triage in response to information provided to the [facility]. Please do not respond through this encounter as the response is not directed to a shared pool.       Reason for Disposition   Patient wants to be seen    Protocols used: HEADACHE-ADULT-[place]

## 2022-05-04 ENCOUNTER — HOSPITAL ENCOUNTER (EMERGENCY)
Age: 37
Discharge: HOME | End: 2022-05-04
Attending: EMERGENCY MEDICINE
Payer: COMMERCIAL

## 2022-05-04 ENCOUNTER — APPOINTMENT (OUTPATIENT)
Dept: GENERAL RADIOLOGY | Age: 37
End: 2022-05-04
Attending: EMERGENCY MEDICINE
Payer: COMMERCIAL

## 2022-05-04 VITALS
TEMPERATURE: 99.3 F | HEART RATE: 91 BPM | HEIGHT: 66 IN | RESPIRATION RATE: 16 BRPM | SYSTOLIC BLOOD PRESSURE: 148 MMHG | OXYGEN SATURATION: 99 % | BODY MASS INDEX: 31.34 KG/M2 | WEIGHT: 195 LBS | DIASTOLIC BLOOD PRESSURE: 90 MMHG

## 2022-05-04 DIAGNOSIS — R07.9 CHEST PAIN, UNSPECIFIED TYPE: ICD-10-CM

## 2022-05-04 DIAGNOSIS — R00.2 PALPITATIONS: Primary | ICD-10-CM

## 2022-05-04 LAB
ALBUMIN SERPL-MCNC: 3.8 G/DL (ref 3.5–5)
ALBUMIN/GLOB SERPL: 1 {RATIO} (ref 1.2–3.5)
ALP SERPL-CCNC: 77 U/L (ref 50–136)
ALT SERPL-CCNC: 17 U/L (ref 12–65)
ANION GAP SERPL CALC-SCNC: 9 MMOL/L (ref 7–16)
AST SERPL-CCNC: 11 U/L (ref 15–37)
ATRIAL RATE: 106 BPM
BASOPHILS # BLD: 0.1 K/UL (ref 0–0.2)
BASOPHILS NFR BLD: 1 % (ref 0–2)
BILIRUB SERPL-MCNC: 0.3 MG/DL (ref 0.2–1.1)
BUN SERPL-MCNC: 10 MG/DL (ref 6–23)
CALCIUM SERPL-MCNC: 8.8 MG/DL (ref 8.3–10.4)
CALCULATED P AXIS, ECG09: 73 DEGREES
CALCULATED R AXIS, ECG10: 7 DEGREES
CALCULATED T AXIS, ECG11: 67 DEGREES
CHLORIDE SERPL-SCNC: 109 MMOL/L (ref 98–107)
CO2 SERPL-SCNC: 23 MMOL/L (ref 21–32)
CREAT SERPL-MCNC: 1 MG/DL (ref 0.6–1)
D DIMER PPP FEU-MCNC: 0.28 UG/ML(FEU)
DIAGNOSIS, 93000: NORMAL
DIFFERENTIAL METHOD BLD: ABNORMAL
EOSINOPHIL # BLD: 0 K/UL (ref 0–0.8)
EOSINOPHIL NFR BLD: 0 % (ref 0.5–7.8)
ERYTHROCYTE [DISTWIDTH] IN BLOOD BY AUTOMATED COUNT: 12.6 % (ref 11.9–14.6)
GLOBULIN SER CALC-MCNC: 4 G/DL (ref 2.3–3.5)
GLUCOSE SERPL-MCNC: 134 MG/DL (ref 65–100)
HCT VFR BLD AUTO: 39.6 % (ref 35.8–46.3)
HGB BLD-MCNC: 13.1 G/DL (ref 11.7–15.4)
IMM GRANULOCYTES # BLD AUTO: 0 K/UL (ref 0–0.5)
IMM GRANULOCYTES NFR BLD AUTO: 0 % (ref 0–5)
LIPASE SERPL-CCNC: 48 U/L (ref 73–393)
LYMPHOCYTES # BLD: 1.8 K/UL (ref 0.5–4.6)
LYMPHOCYTES NFR BLD: 30 % (ref 13–44)
MAGNESIUM SERPL-MCNC: 2.2 MG/DL (ref 1.8–2.4)
MCH RBC QN AUTO: 28.9 PG (ref 26.1–32.9)
MCHC RBC AUTO-ENTMCNC: 33.1 G/DL (ref 31.4–35)
MCV RBC AUTO: 87.4 FL (ref 79.6–97.8)
MONOCYTES # BLD: 0.4 K/UL (ref 0.1–1.3)
MONOCYTES NFR BLD: 6 % (ref 4–12)
NEUTS SEG # BLD: 3.7 K/UL (ref 1.7–8.2)
NEUTS SEG NFR BLD: 63 % (ref 43–78)
NRBC # BLD: 0 K/UL (ref 0–0.2)
P-R INTERVAL, ECG05: 144 MS
PLATELET # BLD AUTO: 249 K/UL (ref 150–450)
PMV BLD AUTO: 9.7 FL (ref 9.4–12.3)
POTASSIUM SERPL-SCNC: 3.5 MMOL/L (ref 3.5–5.1)
PROT SERPL-MCNC: 7.8 G/DL (ref 6.3–8.2)
Q-T INTERVAL, ECG07: 358 MS
QRS DURATION, ECG06: 92 MS
QTC CALCULATION (BEZET), ECG08: 475 MS
RBC # BLD AUTO: 4.53 M/UL (ref 4.05–5.2)
SODIUM SERPL-SCNC: 141 MMOL/L (ref 136–145)
TROPONIN-HIGH SENSITIVITY: 38.6 PG/ML (ref 0–14)
TROPONIN-HIGH SENSITIVITY: 39.1 PG/ML (ref 0–14)
VENTRICULAR RATE, ECG03: 106 BPM
WBC # BLD AUTO: 5.9 K/UL (ref 4.3–11.1)

## 2022-05-04 PROCEDURE — 93005 ELECTROCARDIOGRAM TRACING: CPT | Performed by: EMERGENCY MEDICINE

## 2022-05-04 PROCEDURE — 80053 COMPREHEN METABOLIC PANEL: CPT

## 2022-05-04 PROCEDURE — 85379 FIBRIN DEGRADATION QUANT: CPT

## 2022-05-04 PROCEDURE — 71046 X-RAY EXAM CHEST 2 VIEWS: CPT

## 2022-05-04 PROCEDURE — 83690 ASSAY OF LIPASE: CPT

## 2022-05-04 PROCEDURE — 99285 EMERGENCY DEPT VISIT HI MDM: CPT

## 2022-05-04 PROCEDURE — 84484 ASSAY OF TROPONIN QUANT: CPT

## 2022-05-04 PROCEDURE — 85025 COMPLETE CBC W/AUTO DIFF WBC: CPT

## 2022-05-04 PROCEDURE — 94762 N-INVAS EAR/PLS OXIMTRY CONT: CPT

## 2022-05-04 PROCEDURE — 83735 ASSAY OF MAGNESIUM: CPT

## 2022-05-04 RX ORDER — SODIUM CHLORIDE 0.9 % (FLUSH) 0.9 %
5-10 SYRINGE (ML) INJECTION EVERY 8 HOURS
Status: DISCONTINUED | OUTPATIENT
Start: 2022-05-04 | End: 2022-05-04 | Stop reason: HOSPADM

## 2022-05-04 RX ORDER — SODIUM CHLORIDE 0.9 % (FLUSH) 0.9 %
5-10 SYRINGE (ML) INJECTION AS NEEDED
Status: DISCONTINUED | OUTPATIENT
Start: 2022-05-04 | End: 2022-05-04 | Stop reason: HOSPADM

## 2022-05-04 NOTE — DISCHARGE INSTRUCTIONS
Follow-up with Dr. Garth Valentin as scheduled. Return to the emergency department if your symptoms recur.

## 2022-05-04 NOTE — ED NOTES
I have reviewed discharge instructions with the patient. The patient verbalized understanding. Patient left ED via Discharge Method: ambulatory to Home with self. Opportunity for questions and clarification provided. Patient given 0 scripts. To continue your aftercare when you leave the hospital, you may receive an automated call from our care team to check in on how you are doing. This is a free service and part of our promise to provide the best care and service to meet your aftercare needs.  If you have questions, or wish to unsubscribe from this service please call [phone]. Thank you for Choosing our [facility] Emergency Department.

## 2022-05-04 NOTE — ED TRIAGE NOTES
Pt arrives ambulatory to triage. BIB by EMS. Chest tightness that radiates to left neck and left shoulder, burning sensation with some SOB. Pain increases with movement. Denies aggravating or alleviating factors. Given 324 asa and 3 nitro, not much decrease in pain. Hx of SVT and on metoprolol and states compliance. VS: /110 HR 130s sinus tach, initially, pt anxious with EMS. HR came down to 110 and /90. . 98.9 temp. NAD. Masked.

## 2022-05-04 NOTE — ED PROVIDER NOTES
Patient is a 62-year-old female with a history of SVT and hypertension who presents with palpitations and chest pain. She states she got up at 4 AM this morning and had very mild symptoms that resolved quickly. She went to work and then started having the chest pain with palpitations. She states it lasted for about 15 to 20 minutes. EMS was called, they gave her aspirin and nitroglycerin which immediately improved her symptoms. Per EMS documentation, her heart rate was initially 130 and BP was 220/110. She states she has taken her medications this morning for hypertension and SVT. Her last episode of SVT was 1 year ago, states her symptoms felt different. She currently sees Dr. Ayanna Roche of [facility] cardiology.            Past Medical History:   Diagnosis Date    Acute dysfunction of both eustachian tubes 03/2022    Anemia     denies hx of blood transfusions    Bladder pain     Chronic lower back pain     Chronic pain     back pain-- prn meds    History of kidney stones     with pregnancy- naturally pass    Hypertension     manged with meds    IBS (irritable bowel syndrome)     Interstitial cystitis     Meniere's disease     Migraines     pt denies    Palpitations     Paresthesia and pain of right extremity 6/12/2018    PONV (postoperative nausea and vomiting)      does well with IV antiemetic    Pulmonary embolus ([address] 2015    3 days  s/p  C/S     SVT (supraventricular tachycardia) ([address]     2016 & 2017 - Now see's Dr Ayanna Roche @ [facility]    Varicella        Past Surgical History:   Procedure Laterality Date    COLONOSCOPY N/A 5/8/2017    COLONOSCOPY BMI 30 performed by Chinedu Mills MD at [facility] N/A 6/15/2020    COLONOSCOPY/BMI 33 performed by Jennifer Salas MD at [facility]    HX HEENT Left 03/10/2022    L ear tube placement- Josiane Vicente [address] HERNIA REPAIR  2009 and 56/5492    umbilical hernia X2    HX HYSTERECTOMY 10/2016    unilateral salping-ooph    HX MYOMECTOMY      HX ROTATOR CUFF REPAIR Right 2014    HX SVT ABLATION  02/15/2016    HX SVT ABLATION  2017    HX TUBAL LIGATION  2015    HX UROLOGICAL  12/17/2018    CYSTOSCOPY HYDRODISTENTION OF BLADDER, urethral dilitaion    [place] COLONOSCOPY W/BIOPSY SINGLE/MULTIPLE  5/8/2017              Family History:   Problem Relation Age of Onset    Ulcerative Colitis Mother         also Castleman's disease    No Known Problems Father     Crohn's Disease Sister         pt reports Crohn's and UC (?)    Crohn's Disease Maternal Grandmother     Colon Cancer Maternal Grandfather        Social History     Socioeconomic History    Marital status:      Spouse name: Not on file    Number of children: Not on file    Years of education: Not on file    Highest education level: Not on file   Occupational History    Not on file   Tobacco Use    Smoking status: Never Smoker    Smokeless tobacco: Never Used   Vaping Use    Vaping Use: Never used   Substance and Sexual Activity    Alcohol use: Not Currently    Drug use: No    Sexual activity: Yes     Partners: Male     Birth control/protection: None   Other Topics Concern    Not on file   Social History Narrative    Not on file     Social Determinants of Health     Financial Resource Strain:     Difficulty of Paying Living Expenses: Not on file   Food Insecurity:     Worried About Running Out of Food in the Last Year: Not on file    Rony of Food in the Last Year: Not on file   Transportation Needs:     Lack of Transportation (Medical): Not on file    Lack of Transportation (Non-Medical):  Not on file   Physical Activity:     Days of Exercise per Week: Not on file    Minutes of Exercise per Session: Not on file   Stress:     Feeling of Stress : Not on file   Social Connections:     Frequency of Communication with Friends and Family: Not on file    Frequency of Social Gatherings with Friends and Family: Not on file    Attends Cheondoism Services: Not on file    Active Member of Clubs or Organizations: Not on file    Attends Club or Organization Meetings: Not on file    Marital Status: Not on file   Intimate Partner Violence:     Fear of Current or Ex-Partner: Not on file    Emotionally Abused: Not on file    Physically Abused: Not on file    Sexually Abused: Not on file   Housing Stability:     Unable to Pay for Housing in the Last Year: Not on file    Number of [place] in the Last Year: Not on file    Unstable Housing in the Last Year: Not on file         ALLERGIES: Shellfish derived    Review of Systems   Constitutional: Negative for chills and fever. Cardiovascular: Positive for chest pain and palpitations. Gastrointestinal: Negative for nausea and vomiting. All other systems reviewed and are negative. Vitals:    05/04/22 1112   BP: (!) 151/83   Pulse: 92   Resp: 16   Temp: 99.3 °F (37.4 °C)   SpO2: 99%   Weight: 88.5 kg (195 lb)   Height: 5' 6\" (1.676 m)            Physical Exam  Vitals and nursing note reviewed. Constitutional:       Appearance: Normal appearance. She is well-developed. HENT:      Head: Normocephalic and atraumatic. Eyes:      Conjunctiva/sclera: Conjunctivae normal.      Pupils: Pupils are equal, round, and reactive to light. Cardiovascular:      Rate and Rhythm: Regular rhythm. Tachycardia present. Heart sounds: Normal heart sounds. No murmur heard. Pulmonary:      Effort: Pulmonary effort is normal. No respiratory distress. Breath sounds: No wheezing. Musculoskeletal:         General: No tenderness. Cervical back: Normal range of motion and neck supple. Right lower leg: No edema. Left lower leg: No edema. Skin:     General: Skin is warm and dry. Neurological:      Mental Status: She is alert.    Psychiatric:         Mood and Affect: Mood normal.         Behavior: Behavior normal.          MDM  Number of Diagnoses or Management Options  Chest pain, unspecified type: new and requires workup  Palpitations  Diagnosis management comments: 12:45 PM EMS rhythm strip shows sinus tachycardia rate 110  3:00 PM discussed results with patient, unremarkable labs. Her heart rate is now in the 80s. She has an appointment with Dr. Nichole Giron coming up after her Holter monitor was done.        Amount and/or Complexity of Data Reviewed  Clinical lab tests: ordered and reviewed  Tests in the radiology section of CPT®: ordered and reviewed  Tests in the medicine section of CPT®: ordered and reviewed    Risk of Complications, Morbidity, and/or Mortality  Presenting problems: moderate  Diagnostic procedures: moderate  Management options: moderate    Patient Progress  Patient progress: stable         EKG    Date/Time: 5/4/2022 12:44 PM  Performed by: El López MD  Authorized by: El López MD     ECG reviewed by ED Physician in the absence of a cardiologist: yes    Previous ECG:     Previous ECG:  Unavailable  Interpretation:     Interpretation: normal    Rate:     ECG rate:  106    ECG rate assessment: tachycardic    Rhythm:     Rhythm: sinus tachycardia    Ectopy:     Ectopy: none    QRS:     QRS axis:  Normal    QRS intervals:  Normal  Conduction:     Conduction: normal    ST segments:     ST segments:  Normal  T waves:     T waves: normal

## 2022-06-07 ENCOUNTER — TELEMEDICINE (OUTPATIENT)
Dept: FAMILY MEDICINE CLINIC | Facility: CLINIC | Age: 37
End: 2022-06-07
Payer: MEDICARE

## 2022-06-07 DIAGNOSIS — R53.81 MALAISE: ICD-10-CM

## 2022-06-07 DIAGNOSIS — R50.9 FEVER AND CHILLS: ICD-10-CM

## 2022-06-07 DIAGNOSIS — R05.8 PRODUCTIVE COUGH: ICD-10-CM

## 2022-06-07 DIAGNOSIS — R09.82 POST-NASAL DRIP: ICD-10-CM

## 2022-06-07 DIAGNOSIS — J01.00 ACUTE MAXILLARY SINUSITIS, RECURRENCE NOT SPECIFIED: Primary | ICD-10-CM

## 2022-06-07 DIAGNOSIS — R09.81 NASAL CONGESTION: ICD-10-CM

## 2022-06-07 DIAGNOSIS — J34.89 SINUS PRESSURE: ICD-10-CM

## 2022-06-07 DIAGNOSIS — R52 BODY ACHES: ICD-10-CM

## 2022-06-07 PROCEDURE — 99213 OFFICE O/P EST LOW 20 MIN: CPT | Performed by: NURSE PRACTITIONER

## 2022-06-07 PROCEDURE — G8427 DOCREV CUR MEDS BY ELIG CLIN: HCPCS | Performed by: NURSE PRACTITIONER

## 2022-06-07 RX ORDER — BENZONATATE 100 MG/1
100 CAPSULE ORAL 3 TIMES DAILY PRN
Qty: 21 CAPSULE | Refills: 0 | Status: SHIPPED | OUTPATIENT
Start: 2022-06-07 | End: 2022-06-14

## 2022-06-07 RX ORDER — AMOXICILLIN AND CLAVULANATE POTASSIUM 875; 125 MG/1; MG/1
1 TABLET, FILM COATED ORAL 2 TIMES DAILY
Qty: 20 TABLET | Refills: 0 | Status: SHIPPED | OUTPATIENT
Start: 2022-06-07 | End: 2022-06-17

## 2022-06-07 RX ORDER — LORATADINE 10 MG/1
10 TABLET ORAL DAILY
Qty: 14 TABLET | Refills: 0 | Status: SHIPPED | OUTPATIENT
Start: 2022-06-07 | End: 2022-06-21

## 2022-06-07 RX ORDER — FLUTICASONE PROPIONATE 50 MCG
2 SPRAY, SUSPENSION (ML) NASAL DAILY
Qty: 1 EACH | Refills: 0 | Status: SHIPPED | OUTPATIENT
Start: 2022-06-07 | End: 2022-07-11 | Stop reason: SDUPTHER

## 2022-06-07 ASSESSMENT — ENCOUNTER SYMPTOMS
RECTAL PAIN: 0
SHORTNESS OF BREATH: 0
ABDOMINAL PAIN: 0
WHEEZING: 0
EYES NEGATIVE: 1
EYE DISCHARGE: 0
SORE THROAT: 1
BACK PAIN: 0
CONSTIPATION: 0
DIARRHEA: 0
VOICE CHANGE: 0
COUGH: 1
TROUBLE SWALLOWING: 0
VOMITING: 0
ABDOMINAL DISTENTION: 0
BLOOD IN STOOL: 0
GASTROINTESTINAL NEGATIVE: 1
RHINORRHEA: 1
CHEST TIGHTNESS: 0
ANAL BLEEDING: 0
NAUSEA: 0
SINUS PAIN: 1
ALLERGIC/IMMUNOLOGIC NEGATIVE: 1
EYE PAIN: 0
SINUS PRESSURE: 1
FACIAL SWELLING: 0
STRIDOR: 0

## 2022-06-07 NOTE — PATIENT INSTRUCTIONS
Patient Education        Sinusitis: Care Instructions  Your Care Instructions     Sinusitis is an infection of the lining of the sinus cavities in your head. Sinusitis often follows a cold. It causes pain and pressure in your head andface. In most cases, sinusitis gets better on its own in 1 to 2 weeks. But some mildsymptoms may last for several weeks. Sometimes antibiotics are needed. Follow-up care is a key part of your treatment and safety. Be sure to make and go to all appointments, and call your doctor if you are having problems. It's also a good idea to know your test results and keep alist of the medicines you take. How can you care for yourself at home?  Take an over-the-counter pain medicine, such as acetaminophen (Tylenol), ibuprofen (Advil, Motrin), or naproxen (Aleve). Read and follow all instructions on the label.  If the doctor prescribed antibiotics, take them as directed. Do not stop taking them just because you feel better. You need to take the full course of antibiotics.  Be careful when taking over-the-counter cold or flu medicines and Tylenol at the same time. Many of these medicines have acetaminophen, which is Tylenol. Read the labels to make sure that you are not taking more than the recommended dose. Too much acetaminophen (Tylenol) can be harmful.  Breathe warm, moist air from a steamy shower, a hot bath, or a sink filled with hot water. Avoid cold, dry air. Using a humidifier in your home may help. Follow the directions for cleaning the machine.  Use saline (saltwater) nasal washes. This can help keep your nasal passages open and wash out mucus and bacteria. You can buy saline nose drops at a grocery store or drugstore. Or you can make your own at home by adding 1 teaspoon of salt and 1 teaspoon of baking soda to 2 cups of distilled water. If you make your own, fill a bulb syringe with the solution, insert the tip into your nostril, and squeeze gently. Shanika Da Silvahire your nose.    Put a hot, wet towel or a warm gel pack on your face 3 or 4 times a day for 5 to 10 minutes each time.  Try a decongestant nasal spray like oxymetazoline (Afrin). Do not use it for more than 3 days in a row. Using it for more than 3 days can make your congestion worse. When should you call for help? Call your doctor now or seek immediate medical care if:     You have new or worse swelling or redness in your face or around your eyes.      You have a new or higher fever. Watch closely for changes in your health, and be sure to contact your doctor if:     You have new or worse facial pain.      The mucus from your nose becomes thicker (like pus) or has new blood in it.      You are not getting better as expected. Where can you learn more? Go to https://[employer]. org and sign in to your [employer] account. Enter K322 in the [employer] box to learn more about \"Sinusitis: Care Instructions. \"     If you do not have an account, please click on the \"Sign Up Now\" link. Current as of: September 8, 2021               Content Version: 13.2  © 2006-2022 Healthwise[employer] Inc[employer]. Care instructions adapted under license by [facility] ([facility]). If you have questions about a medical condition or this instruction, always ask your healthcare professional. [address] any warranty or liability for your use of this information.

## 2022-06-07 NOTE — PROGRESS NOTES
[facility], [facility]  Phone: [phone] Fax [phone]  Tiago MathisValerie Boyd MS, APRN, FNP-C  6/7/2022    Jhony Reyes is a 39 y.o. female who was seen by synchronous (real-time) audio-video technology on 6/7/2022 for Sinusitis (Pt reports starting Sat with nasal congestion with yellow d/c, bilat maxillary sinus pressure/pain L>R, scratchy sore throat-PND, productive cough with yellow sputum. Pt reports starting last PM with fever 101, malaise, body aches, and sinus headache. Pt denies any SOB, wheezing, CP, N/V/D/abd pain associated. Pt taking OTC Wendy [place] Cold without relief. Pt denies taking Covid test. Pt denies having Covid vax. LMP-hysterectomy)        Assessment & Plan:     1. Acute maxillary sinusitis, recurrence not specified  Due to pt's symptoms, pt advised to leave work and take at home Covid test. Pt agrees to send me the result via My Chart. If positive, pt will need to quarantine per CDC guideline below (soonest she could return to work would be Friday given her symptoms started Saturday). Will cover for acute maxillary sinusitis with abx. Checked allergies. Will cover with Augmentin 875 mg po bid with food for 10 days and treat other symptoms as below. Pt to f/u with me PRN. Agrees to call in a few days if no better or sooner for new/worsening symptoms. Agrees to go to ER for severe symptoms as discussed. Per CDC current guidelines for quarantine, patient was instructed to remain in quarantine for at least 5 days and patient can be off of quarantine with strict well fitting mask wearing for the following 5 to 10 days after quarantine provided that symptoms are improved after 5 days of quarantine and patient remains afebrile at least 24 hours without use of antipyretics. Patient was instructed to contact office with any worsening symptoms or go to the ED for any chest pain or shortness of breath.   - amoxicillin-clavulanate (AUGMENTIN) 875-125 MG per tablet; Take 1 tablet by mouth 2 times daily for 10 days  Dispense: 20 tablet; Refill: 0    2. Nasal congestion  3. Sinus pressure  4. Post-nasal drip  Will have pt stop OTC Wendy [place] Cold. Start PRN OTC Mucinex as directed. Will also give 14 days of Flonase and Claritin as directed to help with above symptoms. - fluticasone (FLONASE) 50 MCG/ACT nasal spray; 2 sprays by Each Nostril route daily for 14 days  Dispense: 1 each; Refill: 0  - loratadine (CLARITIN) 10 MG tablet; Take 1 tablet by mouth daily for 14 days  Dispense: 14 tablet; Refill: 0    5. Productive cough  Will have pt stop OTC Wendy [place] Cold. Start PRN OTC Mucinex as directed. Will also give PRN Tessalon as directed to help with productive cough. - benzonatate (TESSALON) 100 MG capsule; Take 1 capsule by mouth 3 times daily as needed for Cough  Dispense: 21 capsule; Refill: 0    6. Malaise  7. Body aches  8. Fever and chills  Recommend pt takes PRN OTC Tylenol as directed to help with malaise, body aches, sinus headache, fever. Recommend pt stay well hydrated and get plenty of rest over next few days. Return if symptoms worsen or fail to improve, for Call in a few days if no better or sooner for concerns. ER for severe symptoms. 712  Subjective:       Prior to Admission medications    Medication Sig Start Date End Date Taking?  Authorizing Provider   amoxicillin-clavulanate (AUGMENTIN) 875-125 MG per tablet Take 1 tablet by mouth 2 times daily for 10 days 6/7/22 6/17/22 Yes Hayden Ventura, APRN - NP   fluticasone [facility]) 50 MCG/ACT nasal spray 2 sprays by Each Nostril route daily for 14 days 6/7/22 6/21/22 Yes Hayden Gong Salt, APRN - NP   loratadine (CLARITIN) 10 MG tablet Take 1 tablet by mouth daily for 14 days 6/7/22 6/21/22 Yes Hayden Gong Salt, APRN - NP   benzonatate (TESSALON) 100 MG capsule Take 1 capsule by mouth 3 times daily as needed for Cough 6/7/22 6/14/22 Yes Jorge Man, APRN - NP   dilTIAZem (TIAZAC) 240 MG extended release capsule Take 240 mg by mouth daily 12/8/21  Yes Ar Automatic Reconciliation   ergocalciferol (ERGOCALCIFEROL) 1.25 MG (09627 UT) capsule Take 50,000 Units by mouth every 7 days 1/26/22  Yes Ar Automatic Reconciliation   fluticasone (FLOVENT HFA) 220 MCG/ACT inhaler Inhale 2 puffs into the lungs 2 times daily 8/24/20  Yes Ar Automatic Reconciliation   hydroCHLOROthiazide (HYDRODIURIL) 25 MG tablet Take 25 mg by mouth daily 11/11/21  Yes Ar Automatic Reconciliation   HYDROcodone-acetaminophen (NORCO)  MG per tablet 1 TABLET BY MOUTH ORALLY EVERY 8 HOURS 30 DAYS 9/15/21  Yes Ar Automatic Reconciliation   Hyoscyamine Sulfate SL 0.125 MG SUBL Place 0.125 mg under the tongue every 4 hours as needed 7/27/21  Yes Ar Automatic Reconciliation   linaclotide (LINZESS) 290 MCG CAPS capsule Take 290 mcg by mouth every morning (before breakfast)   Yes Ar Automatic Reconciliation   spironolactone (ALDACTONE) 25 MG tablet Take 25 mg by mouth daily 11/9/21  Yes Ar Automatic Reconciliation   SUMAtriptan (IMITREX) 100 MG tablet TAKE 1 TABLET BY MOUTH ONCE AS NEEDED FOR MIGRAINE FOR UP TO 1 DOSE. 2/1/22  Yes Ar Automatic Reconciliation       Allergies   Allergen Reactions    Shellfish Allergy Anaphylaxis and Hives    Shrimp Extract Allergy Skin Test Hives     facial hives and throat closing sensation   ,   Past Medical History:   Diagnosis Date    Acute dysfunction of both eustachian tubes 03/2022    Anemia     denies hx of blood transfusions    Bladder pain     Chronic lower back pain     Chronic pain     back pain-- prn meds    History of kidney stones     with pregnancy- naturally pass    Hypertension     manged with meds    IBS (irritable bowel syndrome)     Interstitial cystitis     Meniere's disease     Migraines     pt denies    Palpitations     Paresthesia and pain of right extremity 6/12/2018    PONV (postoperative nausea and vomiting)      does well with IV antiemetic    Pulmonary embolus ([address]     3 days  s/p  C/S     SVT (supraventricular tachycardia) ([address]     2016 & 2017 - Now see's Dr Lissette Solis @ [facility]    Varicella    ,   Past Surgical History:   Procedure Laterality Date    ABLATION OF DYSRHYTHMIC FOCUS  02/15/2016    ABLATION OF DYSRHYTHMIC FOCUS  2017     SECTION      X3    COLONOSCOPY N/A 2017    COLONOSCOPY BMI 30 performed by Tawnya Mejia MD at [facility] N/A 6/15/2020    COLONOSCOPY/BMI 33 performed by Vianca Ch MD at [address] [facility] SINGLE/MULTIPLE  2017         HEENT Left 03/10/2022    L ear tube placement- Lavada Fraction HERNIA REPAIR   and     umbilical hernia X2    HYSTERECTOMY  10/2016    unilateral salping-ooph    MYOMECTOMY      ROTATOR CUFF REPAIR Right 2014    TUBAL LIGATION  2015    UROLOGICAL SURGERY  2018    CYSTOSCOPY HYDRODISTENTION OF BLADDER, urethral dilitaion   ,   Social History     Tobacco Use    Smoking status: Never Smoker    Smokeless tobacco: Never Used   Substance Use Topics    Alcohol use: Not Currently    Drug use: No   ,   Family History   Problem Relation Age of Onset    No Known Problems Father     Crohn's Disease Sister         pt reports Crohn's and UC (?)    Crohn's Disease Maternal Grandmother     Colon Cancer Maternal Grandfather     Ulcerative Colitis Mother         also Castleman's disease         Review of Systems   Constitutional: Positive for chills, fatigue and fever. Negative for appetite change, diaphoresis and unexpected weight change. HENT: Positive for congestion (yellow d/c), postnasal drip (scratchy sore throat-PND), rhinorrhea (yellow), sinus pressure (bilat maxillary sinus pressure and pain L>R), sinus pain (bilat maxillary sinus pressure and pain L>R) and sore throat (scratchy sore throat-PND).  Negative for ear discharge, ear pain, facial swelling, hearing loss, mouth sores, nosebleeds, sneezing, tinnitus, trouble swallowing and voice change. Eyes: Negative. Negative for pain, discharge and visual disturbance. Respiratory: Positive for cough (with yellow sputum). Negative for chest tightness, shortness of breath, wheezing and stridor. Cardiovascular: Negative. Negative for chest pain, palpitations and leg swelling. Gastrointestinal: Negative. Negative for abdominal distention, abdominal pain, anal bleeding, blood in stool, constipation, diarrhea, nausea, rectal pain and vomiting. Endocrine: Negative. Genitourinary: Negative. Negative for decreased urine volume, difficulty urinating, dyspareunia, dysuria, flank pain, frequency, genital sores, hematuria, menstrual problem, pelvic pain, urgency, vaginal bleeding, vaginal discharge and vaginal pain. Musculoskeletal: Positive for myalgias (body aches). Negative for arthralgias, back pain, gait problem, joint swelling, neck pain and neck stiffness. Skin: Negative. Negative for pallor and rash. Allergic/Immunologic: Negative. Negative for environmental allergies. Neurological: Positive for headaches (sinus ). Negative for dizziness, tremors, seizures, syncope, facial asymmetry, speech difficulty, weakness, light-headedness and numbness. Hematological: Negative. Negative for adenopathy. Does not bruise/bleed easily. Physical Exam  Constitutional:       General: She is not in acute distress. Appearance: Normal appearance. She is not ill-appearing, toxic-appearing or diaphoretic. HENT:      Head: Normocephalic and atraumatic. Pulmonary:      Effort: Pulmonary effort is normal. No respiratory distress. Comments: No audible wheezing heard  Neurological:      Mental Status: She is alert and oriented to person, place, and time. Psychiatric:         Mood and Affect: Mood normal.         Behavior: Behavior normal.         Thought Content:  Thought content normal.         Judgment: Judgment normal. Janee Vieyra (: 1985) is a 39 y.o. female, Established patient patient, seen via A/V VV for evaluation of the following chief complaint(s):  Sinusitis (Pt reports starting Sat with nasal congestion with yellow d/c, bilat maxillary sinus pressure/pain L>R, scratchy sore throat-PND, productive cough with yellow sputum. Pt reports starting last PM with fever 101, malaise, body aches, and sinus headache. Pt denies any SOB, wheezing, CP, N/V/D/abd pain associated. Pt taking OTC Wendy [place] Cold without relief. Pt denies taking Covid test. Pt denies having Covid vax. LMP-hysterectomy)      We discussed the expected course, resolution and complications of the diagnosis(es) in detail. Medication risks, benefits, costs, interactions, and alternatives were discussed as indicated. I advised her to contact the office if her condition worsens, changes or fails to improve as anticipated. She expressed understanding with the diagnosis(es) and plan. Sri Landers, was evaluated through a synchronous (real-time) audio-video encounter. The patient (or guardian if applicable) is aware that this is a billable service. Verbal consent to proceed has been obtained within the past 12 months. The visit was conducted pursuant to the emergency declaration under the [address] [place] [address] authority and the [employer] Act. Patient identification was verified, and a caregiver was present when appropriate. The patient was located in a state where the provider was credentialed to provide care. Sri Landers, was evaluated through a synchronous (real-time) audio-video encounter. The patient (or guardian if applicable) is aware that this is a billable service, which includes applicable co-pays. This Virtual Visit was conducted with patient's (and/or legal guardian's) consent.  The visit was conducted pursuant to the emergency declaration under the [address] [facility], [facility] authority and the [employer] General Act. Patient identification was verified, and a caregiver was present when appropriate. The patient was located at Other: work. Provider was located at [facility] ([address] [place], [place] [address]t): [address] Prashant Marie 109,  [address].    SAADIA Palomo NP

## 2022-06-10 DIAGNOSIS — B37.31 VAGINAL CANDIDIASIS: Primary | ICD-10-CM

## 2022-06-10 RX ORDER — FLUCONAZOLE 150 MG/1
150 TABLET ORAL ONCE
Qty: 1 TABLET | Refills: 0 | Status: SHIPPED | OUTPATIENT
Start: 2022-06-10 | End: 2022-06-10

## 2022-07-11 ENCOUNTER — TELEMEDICINE (OUTPATIENT)
Dept: FAMILY MEDICINE CLINIC | Facility: CLINIC | Age: 37
End: 2022-07-11
Payer: MEDICARE

## 2022-07-11 DIAGNOSIS — G43.919 INTRACTABLE MIGRAINE WITHOUT STATUS MIGRAINOSUS, UNSPECIFIED MIGRAINE TYPE: Primary | ICD-10-CM

## 2022-07-11 DIAGNOSIS — I10 PRIMARY HYPERTENSION: ICD-10-CM

## 2022-07-11 DIAGNOSIS — R09.82 POST-NASAL DRIP: ICD-10-CM

## 2022-07-11 DIAGNOSIS — R09.81 NASAL CONGESTION: ICD-10-CM

## 2022-07-11 DIAGNOSIS — J34.89 SINUS PRESSURE: ICD-10-CM

## 2022-07-11 DIAGNOSIS — J32.9 RECURRENT SINUSITIS: ICD-10-CM

## 2022-07-11 PROBLEM — R10.33 PERIUMBILICAL ABDOMINAL PAIN: Status: ACTIVE | Noted: 2017-02-14

## 2022-07-11 PROBLEM — F41.9 ANXIETY: Status: ACTIVE | Noted: 2017-08-24

## 2022-07-11 PROCEDURE — G8427 DOCREV CUR MEDS BY ELIG CLIN: HCPCS | Performed by: NURSE PRACTITIONER

## 2022-07-11 PROCEDURE — 99214 OFFICE O/P EST MOD 30 MIN: CPT | Performed by: NURSE PRACTITIONER

## 2022-07-11 RX ORDER — EPINEPHRINE 0.3 MG/.3ML
0.3 INJECTION SUBCUTANEOUS
COMMUNITY
Start: 2019-07-28

## 2022-07-11 RX ORDER — DOXYCYCLINE 100 MG/1
100 TABLET ORAL 2 TIMES DAILY
Qty: 14 TABLET | Refills: 0 | Status: SHIPPED | OUTPATIENT
Start: 2022-07-11 | End: 2022-07-18

## 2022-07-11 RX ORDER — FLUTICASONE PROPIONATE 50 MCG
1 SPRAY, SUSPENSION (ML) NASAL 2 TIMES DAILY
Qty: 3 EACH | Refills: 3 | Status: SHIPPED | OUTPATIENT
Start: 2022-07-11

## 2022-07-11 RX ORDER — AZELASTINE 1 MG/ML
1 SPRAY, METERED NASAL 2 TIMES DAILY
Qty: 60 ML | Refills: 5 | Status: SHIPPED | OUTPATIENT
Start: 2022-07-11

## 2022-07-11 RX ORDER — FLUCONAZOLE 150 MG/1
TABLET ORAL
Qty: 2 TABLET | Refills: 0 | Status: SHIPPED | OUTPATIENT
Start: 2022-07-11 | End: 2022-10-13 | Stop reason: DRUGHIGH

## 2022-07-11 RX ORDER — CYCLOBENZAPRINE HCL 10 MG
TABLET ORAL
COMMUNITY
Start: 2022-06-24 | End: 2022-10-13 | Stop reason: SDUPTHER

## 2022-07-11 RX ORDER — UBROGEPANT 100 MG/1
100 TABLET ORAL PRN
Qty: 60 TABLET | Refills: 11 | Status: SHIPPED | OUTPATIENT
Start: 2022-07-11 | End: 2022-09-27 | Stop reason: SDUPTHER

## 2022-07-11 ASSESSMENT — ENCOUNTER SYMPTOMS
STRIDOR: 0
CHEST TIGHTNESS: 0
ABDOMINAL DISTENTION: 0
RECTAL PAIN: 0
FACIAL SWELLING: 0
WHEEZING: 0
DIARRHEA: 0
VOICE CHANGE: 0
ANAL BLEEDING: 0
SHORTNESS OF BREATH: 0
EYE ITCHING: 0
SINUS PAIN: 1
COLOR CHANGE: 0
NAUSEA: 0
APNEA: 0
ALLERGIC/IMMUNOLOGIC NEGATIVE: 1
CHOKING: 0
CONSTIPATION: 0
SINUS PRESSURE: 1
PHOTOPHOBIA: 0
COUGH: 0
EYES NEGATIVE: 1
RHINORRHEA: 1
VOMITING: 0
BACK PAIN: 0
EYE REDNESS: 0
TROUBLE SWALLOWING: 0
EYE DISCHARGE: 0
ABDOMINAL PAIN: 0
BLOOD IN STOOL: 0
RESPIRATORY NEGATIVE: 1
SORE THROAT: 1
EYE PAIN: 0
GASTROINTESTINAL NEGATIVE: 1

## 2022-07-11 NOTE — PROGRESS NOTES
PROGRESS NOTE        Consent: This patient and/or their healthcare decision maker is aware that this patient-initiated Telehealth encounter is a billable service, with coverage as determined by their insurance carrier. Patient is aware that they may receive a bill and has provided verbal consent to proceed: Yes    I was in the officewhile conducting this encounter. Patient was at home. This virtual visit was conducted via [employer]. Pursuant to the emergency declaration under the [facility], [facility] waiver authority and the [employer] Act, this Virtual  Visit was conducted to reduce the patient's risk of exposure to COVID-19 and provide continuity of care for an established patient. Services were provided through a video synchronous discussion virtually to substitute for in-person clinic visit. Due to this being a TeleHealth evaluation, many elements of the physical examination are unable to be assessed. On this date 7/11/2022 I have spent 30 minutes reviewing previous notes, test results and face to face (virtual) with the patient discussing the diagnosis and importance of compliance with the treatment plan as well as documenting on the day of the visit. . Greater than 50% of this visit was spent counseling the patient about test results, prognosis, importance of compliance, education about disease process, benefits of medications, instructions for management of acute symptoms, and follow up plans. Chief Complaint   Patient presents with    Migraine     patient has had migrane sicne wednesday, patient reports that yesterday it was the worst headache she has ever had. patient saw black spots in her vision, and her BP was elevated at 154/90 and pulse was 122.  patient took imatrex and it didnt help, patient took excedrin       SUBJECTIVE:     Chandler Schulte is a very pleasant 39 y.o. female , with past medical history significant for hypertension, SVT -currently following cardiology, migraine, chronic sinusitis-currently following ENT, PE after her second  pregnancy 6 years ago-now resolved, interstitial cystitis-currently following urology, seen virtually regarding complaints of sinus pressure, nasal congestion, and severe headache that started 5 days ago. She reports that her headache is getting worse and she is seeing \"black spots\" in her vision. She reported having taken Imitrex but no relief. She did have to take an Excedrin last night to get mild relief in order to sleep last night. Her blood pressure has been high and has been as high as 200/100 at home with severe pain. She reports that today she started noticing thick green sinus drainage when blowing her nose. She reports no fever or chills, no chest pain, no shortness of breath, no coughing, no abdominal pain, no nausea, no vomiting, no diarrhea, no unilateral or focal weakness, no edema, no orthopnea, no PND. Past Medical History, Past Surgical History, Family history, Social History, and Medications were all reviewed with the patient today and updated as necessary. Current Outpatient Medications   Medication Sig Dispense Refill    cyclobenzaprine (FLEXERIL) 10 MG tablet TAKE 1 AND 1/2 TABLET BY MOUTH 3 TIMES A DAY      EPINEPHrine (EPIPEN) 0.3 MG/0.3ML SOAJ injection Inject 0.3 mg into the muscle once as needed      Ubrogepant (UBRELVY) 100 MG TABS Take 100 mg by mouth as needed (for migraine headache, may repeat dose in 2 hours as needed.   Max 200mg/ day) 60 tablet 11    azelastine (ASTELIN) 0.1 % nasal spray 1 spray by Nasal route 2 times daily In addition to fluticasone nasal spray 60 mL 5    fluticasone (FLONASE) 50 MCG/ACT nasal spray 1 spray by Each Nostril route in the morning and at bedtime In addition to astelin nasal spray 3 each 3    doxycycline monohydrate (ADOXA) 100 MG tablet Take 1 tablet by mouth 2 times daily for 7 days (antibiotic) 14 tablet 0    fluconazole (DIFLUCAN) 150 MG tablet Take 1 tablet orally at end of antibiotic treatment for yeast infection, may repeat dose in 3 days if needed 2 tablet 0    dilTIAZem (TIAZAC) 240 MG extended release capsule Take 240 mg by mouth daily      ergocalciferol (ERGOCALCIFEROL) 1.25 MG (40410 UT) capsule Take 50,000 Units by mouth every 7 days      fluticasone (FLOVENT HFA) 220 MCG/ACT inhaler Inhale 2 puffs into the lungs 2 times daily      hydroCHLOROthiazide (HYDRODIURIL) 25 MG tablet Take 25 mg by mouth daily      HYDROcodone-acetaminophen (NORCO)  MG per tablet 1 TABLET BY MOUTH ORALLY EVERY 8 HOURS 30 DAYS      Hyoscyamine Sulfate SL 0.125 MG SUBL Place 0.125 mg under the tongue every 4 hours as needed      linaclotide (LINZESS) 290 MCG CAPS capsule Take 290 mcg by mouth every morning (before breakfast)      spironolactone (ALDACTONE) 25 MG tablet Take 25 mg by mouth daily      SUMAtriptan (IMITREX) 100 MG tablet TAKE 1 TABLET BY MOUTH ONCE AS NEEDED FOR MIGRAINE FOR UP TO 1 DOSE. No current facility-administered medications for this visit.      Allergies   Allergen Reactions    Shellfish Allergy Anaphylaxis and Hives    Shrimp Extract Allergy Skin Test Hives     facial hives and throat closing sensation     Patient Active Problem List   Diagnosis    Dyspareunia due to medical condition in female patient    Chronic constipation    Varicella    Migraine    Chronic fatigue    Palpitations    PONV (postoperative nausea and vomiting)    History of endometriosis    Meniere's disease    History of pulmonary embolus (PE)    Pulmonary embolus (HCC)    Estrogen deficiency    Arrhythmia    Hypertension    Estrogen deficient vulvovaginitis    Precordial pain    IC (interstitial cystitis)    SVT (supraventricular tachycardia) (HCC)    Paresthesia and pain of right extremity    Shortness of breath    Anemia    Kidney stones    Carpal tunnel syndrome, bilateral    IBS (irritable bowel syndrome)    Scoliosis of lumbosacral region due to degenerative disease of spine in adult    Anxiety    Edema    H/O hysterectomy for benign disease    Periumbilical abdominal pain    Primary stabbing headache    Right shoulder pain    Umbilical hernia without obstruction and without gangrene     Past Medical History:   Diagnosis Date    Acute dysfunction of both eustachian tubes 2022    Anemia     denies hx of blood transfusions    Bladder pain     Chronic lower back pain     Chronic pain     back pain-- prn meds    History of kidney stones     with pregnancy- naturally pass    Hypertension     manged with meds    IBS (irritable bowel syndrome)     Interstitial cystitis     Meniere's disease     Migraines     pt denies    Palpitations     Paresthesia and pain of right extremity 2018    PONV (postoperative nausea and vomiting)      does well with IV antiemetic    Pulmonary embolus ([address]     3 days  s/p  C/S     SVT (supraventricular tachycardia) ([address]      & 2017 - Now see's Dr Garrett Thomas @ [facility]    Varicella      Past Surgical History:   Procedure Laterality Date    ABLATION OF DYSRHYTHMIC FOCUS  02/15/2016    ABLATION OF DYSRHYTHMIC FOCUS  2017     SECTION      X3    COLONOSCOPY N/A 2017    COLONOSCOPY BMI 30 performed by Ari Billings MD at [address] [place] [address] N/A 6/15/2020    COLONOSCOPY/BMI 33 performed by Katy Bills MD at [address] [place] [address] W/BIOPSY SINGLE/MULTIPLE  2017         HEENT Left 03/10/2022    L ear tube placement- Trudy Overlie HERNIA REPAIR   and     umbilical hernia X2    HYSTERECTOMY (CERVIX STATUS UNKNOWN)  10/2016    unilateral salping-ooph    MYOMECTOMY      ROTATOR CUFF REPAIR Right 2014    TUBAL LIGATION  2015    UROLOGICAL SURGERY  2018    CYSTOSCOPY HYDRODISTENTION OF BLADDER, urethral dilitaion     Family History   Problem Relation Age of Onset    No Known Problems Father     Crohn's Disease Sister         pt reports Crohn's and UC (?)    Crohn's Disease Maternal Grandmother     Colon Cancer Maternal Grandfather     Ulcerative Colitis Mother         also Castleman's disease     Social History     Tobacco Use    Smoking status: Never Smoker    Smokeless tobacco: Never Used   Substance Use Topics    Alcohol use: Not Currently         Review of Systems   Constitutional: Positive for fatigue. Negative for activity change, appetite change, chills, diaphoresis, fever and unexpected weight change. HENT: Positive for congestion, ear pain (ear fullness), postnasal drip, rhinorrhea, sinus pressure, sinus pain and sore throat. Negative for dental problem, drooling, ear discharge, facial swelling, hearing loss, mouth sores, nosebleeds, sneezing, tinnitus, trouble swallowing and voice change. Eyes: Negative. Negative for photophobia, pain, discharge, redness, itching and visual disturbance. Respiratory: Negative. Negative for apnea, cough, choking, chest tightness, shortness of breath, wheezing and stridor. Cardiovascular: Negative. Negative for chest pain, palpitations and leg swelling. Gastrointestinal: Negative. Negative for abdominal distention, abdominal pain, anal bleeding, blood in stool, constipation, diarrhea, nausea, rectal pain and vomiting. Endocrine: Negative. Negative for cold intolerance, heat intolerance, polydipsia, polyphagia and polyuria. Genitourinary: Negative. Negative for decreased urine volume, difficulty urinating, dysuria, enuresis, flank pain, frequency, genital sores, hematuria and urgency. Musculoskeletal: Negative. Negative for arthralgias, back pain, gait problem, joint swelling, myalgias, neck pain and neck stiffness. Skin: Negative. Negative for color change, pallor, rash and wound. Allergic/Immunologic: Negative.   Negative for environmental allergies, food allergies and immunocompromised state. Neurological: Positive for headaches. Negative for dizziness, tremors, seizures, syncope, facial asymmetry, speech difficulty, weakness, light-headedness and numbness. Hematological: Negative. Negative for adenopathy. Does not bruise/bleed easily. Psychiatric/Behavioral: Negative. Negative for agitation, behavioral problems, confusion, decreased concentration, dysphoric mood, hallucinations, self-injury, sleep disturbance and suicidal ideas. The patient is not nervous/anxious and is not hyperactive. OBJECTIVE:    Patient's vital signs were not performed as encounter was performed virtually. Location of virtual visit was patient's home. Physical Exam  Constitutional:       General: She is not in acute distress. Appearance: Normal appearance. She is not toxic-appearing or diaphoretic. HENT:      Head: Normocephalic and atraumatic. Pulmonary:      Comments: Due to limitation nature of virtual visit, I was unable to auscultate patient. No audible wheezing noted via video. Neurological:      General: No focal deficit present. Mental Status: She is alert and oriented to person, place, and time. Cranial Nerves: No cranial nerve deficit. Psychiatric:         Mood and Affect: Mood normal.         Behavior: Behavior normal.         Thought Content: Thought content normal.         Judgment: Judgment normal.          Medical problems and test results were reviewed with the patient today.        CTA HEAD W CONTRAST [[id number]]    Status: Final result     Order Providers    Authorizing Billing   SAADIA Castro - CORETTA Ferreira MD            Signed by    Signed Date/Time Phone Pager   Aaron Price 4/04/2022  3:13 [phone]      Reading Providers    Read Date Phone Pager   Noah Lei Apr 4, 2022  3:13 [phone]      Radiation Dose Estimates    No radiation information found for this patient  Narrative   Title:  CT arteriogram of the neck and head.         Indication: Headaches. History of migraine. Facial numbness.       Technique: Axial images of the head were obtained after the uneventful    administration of intravenous iodinated contrast media. Contrast was used to   best identify the arterial structures.  Images were reviewed on a separate, free   standing, three-dimensional workstation as per the referring physicians request.           .        All CT scans at this facility are performed using dose reduction/dose modulation   techniques, as appropriate the performed exam, including the following:    Automated Exposure Control; Adjustment of the mA and/or kV according to patient   size (this includes techniques or standardized protocols for targeted exams   where dose is matched to indication/reason for exam); and Use of Iterative   Reconstruction Technique.       Comparison: None.       Findings:            Right ICA: Patent       Right MCA: Patent   Right BRANDON: Patent       Left ICA: Patent       Left MCA: Patent   Left BRANDON: Patent       Right Vertebral: Patent   Left Vertebral: Patent   Dominance: Codominant   Basilar: Patent   Right PCA: Patent   Left PCA: Patent       Other Vascular: Negative   Fluid-filled left lateral and mastoid air cells, possibly sinusitis           Impression   1. Left frontal and mastoid sinus disease, otherwise unremarkable study.               ASSESSMENT and PLAN    1. Intractable migraine without status migrainosus, unspecified migraine type  -     Ubrogepant (UBRELVY) 100 MG TABS; Take 100 mg by mouth as needed (for migraine headache, may repeat dose in 2 hours as needed. Max 200mg/ day), Disp-60 tablet, R-11Normal    Patient with a history of migraine. She had a similar episode occur in April where she came into the office and was sent for CTA of head for \"the worst headache ever\".   CTA at that time showed no acute abnormality aside for left frontal and mastoid sinus disease, possible sinusitis. Patient reports symptom recur similarly about 5 days ago. She has Imitrex as needed but medication provided no relief. She reported having samples of Ubrelvy for her headache but is out of medication. She is needing a refill. She also started noticing thick green sinus drainage today. We will refill Nikhil [facility] RX for prn migraine. Pt was advised to go online to [email or web address] to obtain a  saving coupon for medication for saving. We will treat for sinus infection as below. Patient advised to let us know symptoms do not improve or resolve. Advised pt to go immediately to the ED with any loss or changes in fields of vision, chest pain or SOB, unilateral weakness, loss of speech, changes in speech, confusion, facial drooping, syncope, seizure or incontinence of bladder or bowel functions. 2. Recurrent sinusitis  -     azelastine (ASTELIN) 0.1 % nasal spray; 1 spray by Nasal route 2 times daily In addition to fluticasone nasal spray, Disp-60 mL, R-5Normal  -     doxycycline monohydrate (ADOXA) 100 MG tablet; Take 1 tablet by mouth 2 times daily for 7 days (antibiotic), Disp-14 tablet, R-0Normal  -     fluconazole (DIFLUCAN) 150 MG tablet; Take 1 tablet orally at end of antibiotic treatment for yeast infection, may repeat dose in 3 days if needed, Disp-2 tablet, R-0Normal    As above. Patient is currently using loratadine 10 mg daily in addition to Flonase daily with minimal relief. We will add Astelin nasal spray to regimen for prevention. We will have started on doxycycline twice daily. Reviewed risk and side effects of medication including GI upset and increased risk for opportunistic infection such as yeast and C. difficile. Patient was advised to take medication with food and to increase probiotic supplementation (via yogurt, cottage cheese, cultured foods/drinks or OTC probiotic supplements) to avoid any opportunistic infection.   Patient with history of yeast infection that usually require Diflucan for treatment. She is requesting a prescription for Diflucan for yeast.  Rx sent. Advised patient let us know symptoms do not improve or resolve. She is to go to the ED if she has any chest pain or shortness of breath. Patient was also advised to contact her ENT specialist for a follow-up for persistent symptoms. Patient may also benefit with an evaluation with an allergist if she is currently not establish. 3. Nasal congestion  -     azelastine (ASTELIN) 0.1 % nasal spray; 1 spray by Nasal route 2 times daily In addition to fluticasone nasal spray, Disp-60 mL, R-5Normal  -     fluticasone (FLONASE) 50 MCG/ACT nasal spray; 1 spray by Each Nostril route in the morning and at bedtime In addition to astelin nasal spray, Disp-3 each, R-3Normal    As above. 4. Sinus pressure  -     azelastine (ASTELIN) 0.1 % nasal spray; 1 spray by Nasal route 2 times daily In addition to fluticasone nasal spray, Disp-60 mL, R-5Normal  -     fluticasone (FLONASE) 50 MCG/ACT nasal spray; 1 spray by Each Nostril route in the morning and at bedtime In addition to astelin nasal spray, Disp-3 each, R-3Normal    As above. 5. Post-nasal drip  -     azelastine (ASTELIN) 0.1 % nasal spray; 1 spray by Nasal route 2 times daily In addition to fluticasone nasal spray, Disp-60 mL, R-5Normal  -     fluticasone (FLONASE) 50 MCG/ACT nasal spray; 1 spray by Each Nostril route in the morning and at bedtime In addition to astelin nasal spray, Disp-3 each, R-3Normal    As above. 6.  Primary hypertension   Patient reports elevated blood pressure reading that varies between 078-408 for systolic reading and 90 to 798 for diastolic reading depending on pain level. Patient has been taking Excedrin OTC which may also increase her blood pressure reading. Her blood pressure was noted to be normal on 5/19 when she was in office with her cardiologist for follow-up. Patient to continue to monitor her blood pressure reading.

## 2022-07-31 DIAGNOSIS — R09.82 POST-NASAL DRIP: ICD-10-CM

## 2022-07-31 DIAGNOSIS — R09.81 NASAL CONGESTION: ICD-10-CM

## 2022-07-31 DIAGNOSIS — J34.89 SINUS PRESSURE: ICD-10-CM

## 2022-09-20 RX ORDER — FLUTICASONE PROPIONATE 50 MCG
SPRAY, SUSPENSION (ML) NASAL
OUTPATIENT
Start: 2022-09-20

## 2022-09-26 ENCOUNTER — NURSE TRIAGE (OUTPATIENT)
Dept: OTHER | Facility: CLINIC | Age: 37
End: 2022-09-26

## 2022-09-26 NOTE — TELEPHONE ENCOUNTER
Received call from [facility] with [facility]. Subjective: Caller states \"I've been having headaches since over the weekend and is pretty bad. \"     Current Symptoms: headache, blurred vision intermittent, mild tingling in left shoulder x2,     Onset: 3 days ago; gradual, worsening    Associated Symptoms: reduced activity    Pain Severity: 10/10; throbbing; constant    Temperature: Denies       What has been tried: amitrex, made it worse    LMP: NA Pregnant: NA    Recommended disposition: Go to ED/UCC Now (Or to Office with PCP Approval)    Care advice provided, patient verbalizes understanding; denies any other questions or concerns; instructed to call back for any new or worsening symptoms. Writer provided warm transfer to [facility] for second level triage. Attention Provider: Thank you for allowing me to participate in the care of your patient. The patient was connected to triage in response to information provided to the ECC/PSC. Please do not respond through this encounter as the response is not directed to a shared pool.       Reason for Disposition   [1] SEVERE headache (e.g., excruciating) AND [2] \"worst headache\" of life    Protocols used: [id number]

## 2022-09-27 ENCOUNTER — TELEPHONE (OUTPATIENT)
Dept: CARDIOLOGY CLINIC | Age: 37
End: 2022-09-27

## 2022-09-27 ENCOUNTER — OFFICE VISIT (OUTPATIENT)
Dept: FAMILY MEDICINE CLINIC | Facility: CLINIC | Age: 37
End: 2022-09-27
Payer: MEDICARE

## 2022-09-27 VITALS
BODY MASS INDEX: 33.57 KG/M2 | HEART RATE: 96 BPM | WEIGHT: 208 LBS | SYSTOLIC BLOOD PRESSURE: 178 MMHG | OXYGEN SATURATION: 99 % | RESPIRATION RATE: 16 BRPM | DIASTOLIC BLOOD PRESSURE: 112 MMHG

## 2022-09-27 DIAGNOSIS — J30.9 ALLERGIC RHINITIS, UNSPECIFIED SEASONALITY, UNSPECIFIED TRIGGER: ICD-10-CM

## 2022-09-27 DIAGNOSIS — R23.3 EASY BRUISING: ICD-10-CM

## 2022-09-27 DIAGNOSIS — I47.1 SVT (SUPRAVENTRICULAR TACHYCARDIA) (HCC): ICD-10-CM

## 2022-09-27 DIAGNOSIS — I10 UNCONTROLLED HYPERTENSION: ICD-10-CM

## 2022-09-27 DIAGNOSIS — G43.919 INTRACTABLE MIGRAINE WITHOUT STATUS MIGRAINOSUS, UNSPECIFIED MIGRAINE TYPE: Primary | ICD-10-CM

## 2022-09-27 LAB
ALBUMIN SERPL-MCNC: 4 G/DL (ref 3.5–5)
ALBUMIN/GLOB SERPL: 1.1 {RATIO} (ref 1.2–3.5)
ALP SERPL-CCNC: 93 U/L (ref 50–136)
ALT SERPL-CCNC: 27 U/L (ref 12–65)
ANION GAP SERPL CALC-SCNC: 1 MMOL/L (ref 4–13)
AST SERPL-CCNC: 15 U/L (ref 15–37)
BASOPHILS # BLD: 0.1 K/UL (ref 0–0.2)
BASOPHILS NFR BLD: 1 % (ref 0–2)
BILIRUB SERPL-MCNC: 0.3 MG/DL (ref 0.2–1.1)
BUN SERPL-MCNC: 10 MG/DL (ref 6–23)
CALCIUM SERPL-MCNC: 9.4 MG/DL (ref 8.3–10.4)
CHLORIDE SERPL-SCNC: 105 MMOL/L (ref 101–110)
CO2 SERPL-SCNC: 30 MMOL/L (ref 21–32)
CREAT SERPL-MCNC: 1 MG/DL (ref 0.6–1)
DIFFERENTIAL METHOD BLD: ABNORMAL
EOSINOPHIL # BLD: 0.1 K/UL (ref 0–0.8)
EOSINOPHIL NFR BLD: 2 % (ref 0.5–7.8)
ERYTHROCYTE [DISTWIDTH] IN BLOOD BY AUTOMATED COUNT: 12.6 % (ref 11.9–14.6)
ERYTHROCYTE [SEDIMENTATION RATE] IN BLOOD: 9 MM/HR (ref 0–20)
GLOBULIN SER CALC-MCNC: 3.8 G/DL (ref 2.3–3.5)
GLUCOSE SERPL-MCNC: 85 MG/DL (ref 65–100)
HCT VFR BLD AUTO: 42.3 % (ref 35.8–46.3)
HGB BLD-MCNC: 14.1 G/DL (ref 11.7–15.4)
IMM GRANULOCYTES # BLD AUTO: 0 K/UL (ref 0–0.5)
IMM GRANULOCYTES NFR BLD AUTO: 0 % (ref 0–5)
LYMPHOCYTES # BLD: 2.6 K/UL (ref 0.5–4.6)
LYMPHOCYTES NFR BLD: 51 % (ref 13–44)
MCH RBC QN AUTO: 29.2 PG (ref 26.1–32.9)
MCHC RBC AUTO-ENTMCNC: 33.3 G/DL (ref 31.4–35)
MCV RBC AUTO: 87.6 FL (ref 79.6–97.8)
MONOCYTES # BLD: 0.5 K/UL (ref 0.1–1.3)
MONOCYTES NFR BLD: 9 % (ref 4–12)
NEUTS SEG # BLD: 1.9 K/UL (ref 1.7–8.2)
NEUTS SEG NFR BLD: 37 % (ref 43–78)
NRBC # BLD: 0 K/UL (ref 0–0.2)
PLATELET # BLD AUTO: 302 K/UL (ref 150–450)
PMV BLD AUTO: 10.1 FL (ref 9.4–12.3)
POTASSIUM SERPL-SCNC: 3.2 MMOL/L (ref 3.5–5.1)
PROT SERPL-MCNC: 7.8 G/DL (ref 6.3–8.2)
RBC # BLD AUTO: 4.83 M/UL (ref 4.05–5.2)
SODIUM SERPL-SCNC: 136 MMOL/L (ref 136–145)
WBC # BLD AUTO: 5.1 K/UL (ref 4.3–11.1)

## 2022-09-27 PROCEDURE — 96372 THER/PROPH/DIAG INJ SC/IM: CPT | Performed by: FAMILY MEDICINE

## 2022-09-27 PROCEDURE — 1036F TOBACCO NON-USER: CPT | Performed by: FAMILY MEDICINE

## 2022-09-27 PROCEDURE — G8427 DOCREV CUR MEDS BY ELIG CLIN: HCPCS | Performed by: FAMILY MEDICINE

## 2022-09-27 PROCEDURE — 99214 OFFICE O/P EST MOD 30 MIN: CPT | Performed by: FAMILY MEDICINE

## 2022-09-27 PROCEDURE — G8417 CALC BMI ABV UP PARAM F/U: HCPCS | Performed by: FAMILY MEDICINE

## 2022-09-27 RX ORDER — UBROGEPANT 100 MG/1
100 TABLET ORAL PRN
Qty: 60 TABLET | Refills: 2 | Status: SHIPPED | OUTPATIENT
Start: 2022-09-27 | End: 2022-09-27 | Stop reason: SDUPTHER

## 2022-09-27 RX ORDER — UBROGEPANT 100 MG/1
100 TABLET ORAL PRN
Qty: 10 TABLET | Refills: 2 | Status: SHIPPED | OUTPATIENT
Start: 2022-09-27 | End: 2022-10-13 | Stop reason: SDUPTHER

## 2022-09-27 RX ORDER — KETOROLAC TROMETHAMINE 30 MG/ML
60 INJECTION, SOLUTION INTRAMUSCULAR; INTRAVENOUS ONCE
Status: COMPLETED | OUTPATIENT
Start: 2022-09-27 | End: 2022-09-27

## 2022-09-27 RX ORDER — KETOROLAC TROMETHAMINE 15 MG/ML
15 INJECTION, SOLUTION INTRAMUSCULAR; INTRAVENOUS
Status: SHIPPED | OUTPATIENT
Start: 2022-09-27 | End: 2022-09-28

## 2022-09-27 RX ORDER — SPIRONOLACTONE 50 MG/1
50 TABLET, FILM COATED ORAL DAILY
Qty: 30 TABLET | Refills: 2 | Status: SHIPPED | OUTPATIENT
Start: 2022-09-27

## 2022-09-27 RX ORDER — KETOROLAC TROMETHAMINE 15 MG/ML
15 INJECTION, SOLUTION INTRAMUSCULAR; INTRAVENOUS EVERY 6 HOURS PRN
Status: DISCONTINUED | OUTPATIENT
Start: 2022-09-27 | End: 2022-09-27

## 2022-09-27 RX ADMIN — KETOROLAC TROMETHAMINE 15 MG: 30 INJECTION, SOLUTION INTRAMUSCULAR; INTRAVENOUS at 11:39

## 2022-09-27 ASSESSMENT — ENCOUNTER SYMPTOMS
EYE DISCHARGE: 0
SORE THROAT: 0
CHOKING: 0
VOMITING: 0
FACIAL SWELLING: 0
CONSTIPATION: 0
EYE ITCHING: 0
ABDOMINAL DISTENTION: 0
CHEST TIGHTNESS: 0
COUGH: 0
SHORTNESS OF BREATH: 0
TROUBLE SWALLOWING: 0
EYE REDNESS: 0
PHOTOPHOBIA: 1
ANAL BLEEDING: 0
RHINORRHEA: 0
DIARRHEA: 0
SINUS PAIN: 0
BLOOD IN STOOL: 0
SINUS PRESSURE: 0
RECTAL PAIN: 0
COLOR CHANGE: 0
STRIDOR: 0
ABDOMINAL PAIN: 0
APNEA: 0
NAUSEA: 0
WHEEZING: 0
RESPIRATORY NEGATIVE: 1
GASTROINTESTINAL NEGATIVE: 1
EYE PAIN: 0
BACK PAIN: 0
VOICE CHANGE: 0

## 2022-09-27 NOTE — TELEPHONE ENCOUNTER
Saw PCP today for Migraine. BP was noted to be 178/112,She was  given injection for her headache and told to fu in next 2 weeks w/. She came up to make an appt. but could not be scheduled in 2 weeks. CAN WE WORK HER IN? Current Outpatient Medications on File Prior to Visit   Medication Sig Dispense Refill    spironolactone (ALDACTONE) 50 MG tablet Take 1 tablet by mouth daily 30 tablet 2    Ubrogepant (UBRELVY) 100 MG TABS Take 100 mg by mouth as needed (for migraine headache, may repeat dose in 2 hours as needed.   Max 200mg/ day) 10 tablet 2    cyclobenzaprine (FLEXERIL) 10 MG tablet TAKE 1 AND 1/2 TABLET BY MOUTH 3 TIMES A DAY      EPINEPHrine (EPIPEN) 0.3 MG/0.3ML SOAJ injection Inject 0.3 mg into the muscle once as needed      azelastine (ASTELIN) 0.1 % nasal spray 1 spray by Nasal route 2 times daily In addition to fluticasone nasal spray 60 mL 5    fluticasone (FLONASE) 50 MCG/ACT nasal spray 1 spray by Each Nostril route in the morning and at bedtime In addition to astelin nasal spray 3 each 3    fluconazole (DIFLUCAN) 150 MG tablet Take 1 tablet orally at end of antibiotic treatment for yeast infection, may repeat dose in 3 days if needed 2 tablet 0    dilTIAZem (TIAZAC) 240 MG extended release capsule Take 240 mg by mouth daily      ergocalciferol (ERGOCALCIFEROL) 1.25 MG (17224 UT) capsule Take 50,000 Units by mouth every 7 days      fluticasone (FLOVENT HFA) 220 MCG/ACT inhaler Inhale 2 puffs into the lungs 2 times daily      hydroCHLOROthiazide (HYDRODIURIL) 25 MG tablet Take 25 mg by mouth daily      HYDROcodone-acetaminophen (NORCO)  MG per tablet 1 TABLET BY MOUTH ORALLY EVERY 8 HOURS 30 DAYS      Hyoscyamine Sulfate SL 0.125 MG SUBL Place 0.125 mg under the tongue every 4 hours as needed      linaclotide (LINZESS) 290 MCG CAPS capsule Take 290 mcg by mouth every morning (before breakfast)       Current Facility-Administered Medications on File Prior to Visit   Medication Dose Route Frequency Provider Last Rate Last Admin    ketorolac (TORADOL) injection 15 mg  15 mg IntraMUSCular Once PRN SAADIA Conteh NP

## 2022-09-28 DIAGNOSIS — E87.6 HYPOKALEMIA: Primary | ICD-10-CM

## 2022-09-28 RX ORDER — POTASSIUM CHLORIDE 20 MEQ/1
20 TABLET, EXTENDED RELEASE ORAL DAILY
Qty: 2 TABLET | Refills: 0 | Status: SHIPPED | OUTPATIENT
Start: 2022-09-28 | End: 2022-09-30

## 2022-09-30 ENCOUNTER — NURSE ONLY (OUTPATIENT)
Dept: FAMILY MEDICINE CLINIC | Facility: CLINIC | Age: 37
End: 2022-09-30
Payer: MEDICARE

## 2022-09-30 ENCOUNTER — TELEPHONE (OUTPATIENT)
Dept: FAMILY MEDICINE CLINIC | Facility: CLINIC | Age: 37
End: 2022-09-30

## 2022-09-30 DIAGNOSIS — E87.6 HYPOKALEMIA: ICD-10-CM

## 2022-09-30 LAB
ANION GAP SERPL CALC-SCNC: 4 MMOL/L (ref 4–13)
BUN SERPL-MCNC: 13 MG/DL (ref 6–23)
CALCIUM SERPL-MCNC: 9.2 MG/DL (ref 8.3–10.4)
CHLORIDE SERPL-SCNC: 106 MMOL/L (ref 101–110)
CO2 SERPL-SCNC: 30 MMOL/L (ref 21–32)
CREAT SERPL-MCNC: 0.9 MG/DL (ref 0.6–1)
GLUCOSE SERPL-MCNC: 85 MG/DL (ref 65–100)
POTASSIUM SERPL-SCNC: 3.4 MMOL/L (ref 3.5–5.1)
SODIUM SERPL-SCNC: 140 MMOL/L (ref 136–145)

## 2022-09-30 PROCEDURE — 36415 COLL VENOUS BLD VENIPUNCTURE: CPT | Performed by: FAMILY MEDICINE

## 2022-10-01 PROBLEM — E87.6 HYPOKALEMIA: Status: ACTIVE | Noted: 2022-10-01

## 2022-10-04 ENCOUNTER — NURSE ONLY (OUTPATIENT)
Dept: FAMILY MEDICINE CLINIC | Facility: CLINIC | Age: 37
End: 2022-10-04

## 2022-10-04 DIAGNOSIS — E87.6 HYPOKALEMIA: Primary | ICD-10-CM

## 2022-10-04 LAB
ANION GAP SERPL CALC-SCNC: 3 MMOL/L (ref 4–13)
BUN SERPL-MCNC: 9 MG/DL (ref 6–23)
CALCIUM SERPL-MCNC: 8.9 MG/DL (ref 8.3–10.4)
CHLORIDE SERPL-SCNC: 109 MMOL/L (ref 101–110)
CO2 SERPL-SCNC: 29 MMOL/L (ref 21–32)
CREAT SERPL-MCNC: 0.9 MG/DL (ref 0.6–1)
GLUCOSE SERPL-MCNC: 81 MG/DL (ref 65–100)
POTASSIUM SERPL-SCNC: 3.5 MMOL/L (ref 3.5–5.1)
SODIUM SERPL-SCNC: 141 MMOL/L (ref 136–145)

## 2022-10-07 ENCOUNTER — TELEPHONE (OUTPATIENT)
Dept: FAMILY MEDICINE CLINIC | Facility: CLINIC | Age: 37
End: 2022-10-07

## 2022-10-07 NOTE — TELEPHONE ENCOUNTER
I called patient to schedule appointment for headaches per her request. Patient stated she couldn't come in today and will see how she does over the weekend and call our office Monday if needed, otherwise she will keep her follow up on 10/13/22.

## 2022-10-13 ENCOUNTER — OFFICE VISIT (OUTPATIENT)
Dept: FAMILY MEDICINE CLINIC | Facility: CLINIC | Age: 37
End: 2022-10-13
Payer: MEDICARE

## 2022-10-13 VITALS
SYSTOLIC BLOOD PRESSURE: 140 MMHG | DIASTOLIC BLOOD PRESSURE: 90 MMHG | BODY MASS INDEX: 33.43 KG/M2 | WEIGHT: 208 LBS | HEIGHT: 66 IN

## 2022-10-13 DIAGNOSIS — G43.919 INTRACTABLE MIGRAINE WITHOUT STATUS MIGRAINOSUS, UNSPECIFIED MIGRAINE TYPE: Primary | ICD-10-CM

## 2022-10-13 DIAGNOSIS — I10 PRIMARY HYPERTENSION: ICD-10-CM

## 2022-10-13 PROCEDURE — 1036F TOBACCO NON-USER: CPT | Performed by: FAMILY MEDICINE

## 2022-10-13 PROCEDURE — G8417 CALC BMI ABV UP PARAM F/U: HCPCS | Performed by: FAMILY MEDICINE

## 2022-10-13 PROCEDURE — 99214 OFFICE O/P EST MOD 30 MIN: CPT | Performed by: FAMILY MEDICINE

## 2022-10-13 PROCEDURE — G8427 DOCREV CUR MEDS BY ELIG CLIN: HCPCS | Performed by: FAMILY MEDICINE

## 2022-10-13 PROCEDURE — G8484 FLU IMMUNIZE NO ADMIN: HCPCS | Performed by: FAMILY MEDICINE

## 2022-10-13 RX ORDER — PROPRANOLOL HCL 60 MG
60 CAPSULE, EXTENDED RELEASE 24HR ORAL DAILY
Qty: 90 CAPSULE | Refills: 1 | Status: SHIPPED | OUTPATIENT
Start: 2022-10-13

## 2022-10-13 RX ORDER — UBROGEPANT 100 MG/1
100 TABLET ORAL PRN
Qty: 10 TABLET | Refills: 2 | Status: SHIPPED | OUTPATIENT
Start: 2022-10-13

## 2022-10-13 ASSESSMENT — PATIENT HEALTH QUESTIONNAIRE - PHQ9
2. FEELING DOWN, DEPRESSED OR HOPELESS: 0
SUM OF ALL RESPONSES TO PHQ QUESTIONS 1-9: 0
SUM OF ALL RESPONSES TO PHQ QUESTIONS 1-9: 0
SUM OF ALL RESPONSES TO PHQ9 QUESTIONS 1 & 2: 0
SUM OF ALL RESPONSES TO PHQ QUESTIONS 1-9: 0
SUM OF ALL RESPONSES TO PHQ QUESTIONS 1-9: 0
1. LITTLE INTEREST OR PLEASURE IN DOING THINGS: 0

## 2022-10-13 ASSESSMENT — ENCOUNTER SYMPTOMS
SHORTNESS OF BREATH: 0
COUGH: 0
SINUS PAIN: 0
DIARRHEA: 0
RHINORRHEA: 0
ABDOMINAL PAIN: 0

## 2022-10-13 NOTE — PROGRESS NOTES
PROGRESS NOTE    SUBJECTIVE:   Rosemary Olvera is a 39 y.o. female seen for a follow up visit regarding the following chief complaint:     Chief Complaint   Patient presents with    Discuss Labs    Migraine     Frequent migraines, pt comes for a F/U           HPI patient is doing a follow-up of lab work/work-up that was done prior to coming here by the nurse practitioner in the office complaining of frequent migraines and elevated blood pressure      Past Medical History, Past Surgical History, Family history, Social History, and Medications were all reviewed with the patient today and updated as necessary. Current Outpatient Medications   Medication Sig Dispense Refill    propranolol (INDERAL LA) 60 MG extended release capsule Take 1 capsule by mouth daily 90 capsule 1    Ubrogepant (UBRELVY) 100 MG TABS Take 100 mg by mouth as needed (for migraine headache, may repeat dose in 2 hours as needed.   Max 200mg/ day) 10 tablet 2    spironolactone (ALDACTONE) 50 MG tablet Take 1 tablet by mouth daily 30 tablet 2    EPINEPHrine (EPIPEN) 0.3 MG/0.3ML SOAJ injection Inject 0.3 mg into the muscle once as needed      azelastine (ASTELIN) 0.1 % nasal spray 1 spray by Nasal route 2 times daily In addition to fluticasone nasal spray 60 mL 5    fluticasone (FLONASE) 50 MCG/ACT nasal spray 1 spray by Each Nostril route in the morning and at bedtime In addition to astelin nasal spray 3 each 3    dilTIAZem (TIAZAC) 240 MG extended release capsule Take 240 mg by mouth daily      fluticasone (FLOVENT HFA) 220 MCG/ACT inhaler Inhale 2 puffs into the lungs 2 times daily      hydroCHLOROthiazide (HYDRODIURIL) 25 MG tablet Take 25 mg by mouth daily      HYDROcodone-acetaminophen (NORCO)  MG per tablet 1 TABLET BY MOUTH ORALLY EVERY 8 HOURS 30 DAYS      Hyoscyamine Sulfate SL 0.125 MG SUBL Place 0.125 mg under the tongue every 4 hours as needed      linaclotide (LINZESS) 290 MCG CAPS capsule Take 290 mcg by mouth every morning (before breakfast)      potassium chloride (KLOR-CON M) 20 MEQ extended release tablet Take 1 tablet by mouth daily for 2 days 2 tablet 0     No current facility-administered medications for this visit.      Allergies   Allergen Reactions    Shellfish Allergy Anaphylaxis and Hives    Shrimp Extract Allergy Skin Test Hives     facial hives and throat closing sensation     Patient Active Problem List   Diagnosis    Dyspareunia due to medical condition in female patient    Chronic constipation    Varicella    Migraine    Chronic fatigue    Palpitations    PONV (postoperative nausea and vomiting)    History of endometriosis    Meniere's disease    History of pulmonary embolus (PE)    Pulmonary embolus (HCC)    Estrogen deficiency    Arrhythmia    Hypertension    Estrogen deficient vulvovaginitis    Precordial pain    IC (interstitial cystitis)    SVT (supraventricular tachycardia) (HCC)    Paresthesia and pain of right extremity    Shortness of breath    Anemia    Kidney stones    Carpal tunnel syndrome, bilateral    IBS (irritable bowel syndrome)    Scoliosis of lumbosacral region due to degenerative disease of spine in adult    Anxiety    Edema    H/O hysterectomy for benign disease    Periumbilical abdominal pain    Primary stabbing headache    Right shoulder pain    Umbilical hernia without obstruction and without gangrene    Hypokalemia     Past Medical History:   Diagnosis Date    Acute dysfunction of both eustachian tubes 03/2022    Anemia     denies hx of blood transfusions    Bladder pain     Chronic lower back pain     Chronic pain     back pain-- prn meds    History of kidney stones     with pregnancy- naturally pass    Hypertension     manged with meds    IBS (irritable bowel syndrome)     Interstitial cystitis     Meniere's disease     Migraines     pt denies    Palpitations     Paresthesia and pain of right extremity 6/12/2018    PONV (postoperative nausea and vomiting)      does well with IV antiemetic    Pulmonary embolus ([address]     3 days  s/p  C/S     SVT (supraventricular tachycardia) [address] [address]     2016 & 2017 - Now see's Dr Jewel Samuel @ [facility]    Varicella      Past Surgical History:   Procedure Laterality Date    ABLATION OF DYSRHYTHMIC FOCUS  02/15/2016    ABLATION OF DYSRHYTHMIC FOCUS  2017     SECTION      X3    COLONOSCOPY N/A 2017    COLONOSCOPY BMI 30 performed by Aidan Vo MD at [facility] N/A 6/15/2020    COLONOSCOPY/BMI 33 performed by Kathi Méndez MD at [facility] ENDOSCOPY    COLONOSCOPY W/BIOPSY SINGLE/MULTIPLE  2017         HEENT Left 03/10/2022    L ear tube placement- [facility] [facility]   and     umbilical hernia X2    HYSTERECTOMY (CERVIX STATUS UNKNOWN)  10/2016    unilateral salping-ooph    MYOMECTOMY      ROTATOR CUFF REPAIR Right 2014    TUBAL LIGATION  2015    UROLOGICAL SURGERY  2018    CYSTOSCOPY HYDRODISTENTION OF BLADDER, urethral dilitaion     Family History   Problem Relation Age of Onset    No Known Problems Father     Crohn's Disease Sister         pt reports Crohn's and UC (?)    Crohn's Disease Maternal Grandmother     Colon Cancer Maternal Grandfather     Ulcerative Colitis Mother         also Castleman's disease     Social History     Tobacco Use    Smoking status: Never     Passive exposure: Never    Smokeless tobacco: Never   Substance Use Topics    Alcohol use: Not Currently         Review of Systems   Constitutional:  Negative for chills, fatigue and fever. HENT:  Negative for congestion, rhinorrhea and sinus pain. Eyes:  Negative for visual disturbance. Respiratory:  Negative for cough and shortness of breath. Cardiovascular:  Negative for chest pain. Gastrointestinal:  Negative for abdominal pain and diarrhea. Genitourinary:  Negative for dysuria. Musculoskeletal:  Negative for arthralgias and myalgias. Neurological:  Positive for headaches.  Negative for dizziness and weakness. Psychiatric/Behavioral: Negative. OBJECTIVE:  BP (!) 140/90 (Site: Left Upper Arm, Position: Sitting, Cuff Size: Large Adult)   Ht 5' 6\" (1.676 m)   Wt 208 lb (94.3 kg)   BMI 33.57 kg/m²      Physical Exam  Vitals and nursing note reviewed. Constitutional:       Appearance: Normal appearance. HENT:      Head: Normocephalic and atraumatic. Right Ear: Tympanic membrane normal.      Left Ear: Tympanic membrane normal.      Nose: Nose normal.      Mouth/Throat:      Mouth: Mucous membranes are moist.   Eyes:      Conjunctiva/sclera: Conjunctivae normal.      Pupils: Pupils are equal, round, and reactive to light. Cardiovascular:      Rate and Rhythm: Normal rate and regular rhythm. Pulses: Normal pulses. Heart sounds: Normal heart sounds. Pulmonary:      Effort: Pulmonary effort is normal.      Breath sounds: Normal breath sounds. Abdominal:      General: Abdomen is flat. Bowel sounds are normal.      Palpations: Abdomen is soft. Musculoskeletal:         General: Normal range of motion. Cervical back: Normal range of motion and neck supple. Skin:     General: Skin is warm and dry. Neurological:      General: No focal deficit present. Mental Status: She is alert and oriented to person, place, and time. Psychiatric:         Behavior: Behavior normal.        Medical problems and test results were reviewed with the patient today.      Recent Results (from the past 672 hour(s))   Sedimentation Rate    Collection Time: 09/27/22 11:44 AM   Result Value Ref Range    Sed Rate, Automated 9 0 - 20 mm/hr   Comprehensive Metabolic Panel    Collection Time: 09/27/22 11:44 AM   Result Value Ref Range    Sodium 136 136 - 145 mmol/L    Potassium 3.2 (L) 3.5 - 5.1 mmol/L    Chloride 105 101 - 110 mmol/L    CO2 30 21 - 32 mmol/L    Anion Gap 1 (L) 4 - 13 mmol/L    Glucose 85 65 - 100 mg/dL    BUN 10 6 - 23 MG/DL    Creatinine 1.00 0.6 - 1.0 MG/DL    GFR  >60 >60 ml/min/1.73m2    GFR Non- >60 >60 ml/min/1.73m2    Calcium 9.4 8.3 - 10.4 MG/DL    Total Bilirubin 0.3 0.2 - 1.1 MG/DL    ALT 27 12 - 65 U/L    AST 15 15 - 37 U/L    Alk Phosphatase 93 50 - 136 U/L    Total Protein 7.8 6.3 - 8.2 g/dL    Albumin 4.0 3.5 - 5.0 g/dL    Globulin 3.8 (H) 2.3 - 3.5 g/dL    Albumin/Globulin Ratio 1.1 (L) 1.2 - 3.5     CBC with Auto Differential    Collection Time: 09/27/22 11:44 AM   Result Value Ref Range    WBC 5.1 4.3 - 11.1 K/uL    RBC 4.83 4.05 - 5.2 M/uL    Hemoglobin 14.1 11.7 - 15.4 g/dL    Hematocrit 42.3 35.8 - 46.3 %    MCV 87.6 79.6 - 97.8 FL    MCH 29.2 26.1 - 32.9 PG    MCHC 33.3 31.4 - 35.0 g/dL    RDW 12.6 11.9 - 14.6 %    Platelets 075 829 - 402 K/uL    MPV 10.1 9.4 - 12.3 FL    nRBC 0.00 0.0 - 0.2 K/uL    Differential Type AUTOMATED      Seg Neutrophils 37 (L) 43 - 78 %    Lymphocytes 51 (H) 13 - 44 %    Monocytes 9 4.0 - 12.0 %    Eosinophils % 2 0.5 - 7.8 %    Basophils 1 0.0 - 2.0 %    Immature Granulocytes 0 0.0 - 5.0 %    Segs Absolute 1.9 1.7 - 8.2 K/UL    Absolute Lymph # 2.6 0.5 - 4.6 K/UL    Absolute Mono # 0.5 0.1 - 1.3 K/UL    Absolute Eos # 0.1 0.0 - 0.8 K/UL    Basophils Absolute 0.1 0.0 - 0.2 K/UL    Absolute Immature Granulocyte 0.0 0.0 - 0.5 K/UL   Basic Metabolic Panel    Collection Time: 09/30/22 10:36 AM   Result Value Ref Range    Sodium 140 136 - 145 mmol/L    Potassium 3.4 (L) 3.5 - 5.1 mmol/L    Chloride 106 101 - 110 mmol/L    CO2 30 21 - 32 mmol/L    Anion Gap 4 4 - 13 mmol/L    Glucose 85 65 - 100 mg/dL    BUN 13 6 - 23 MG/DL    Creatinine 0.90 0.6 - 1.0 MG/DL    GFR African American >60 >60 ml/min/1.73m2    GFR Non- >60 >60 ml/min/1.73m2    Calcium 9.2 8.3 - 10.4 MG/DL   Basic Metabolic Panel    Collection Time: 10/04/22  9:13 AM   Result Value Ref Range    Sodium 141 136 - 145 mmol/L    Potassium 3.5 3.5 - 5.1 mmol/L    Chloride 109 101 - 110 mmol/L    CO2 29 21 - 32 mmol/L    Anion Gap 3 (L) 4 - 13 mmol/L Glucose 81 65 - 100 mg/dL    BUN 9 6 - 23 MG/DL    Creatinine 0.90 0.6 - 1.0 MG/DL    Est, Glom Filt Rate >60 >60 ml/min/1.73m2    Calcium 8.9 8.3 - 10.4 MG/DL       ASSESSMENT and PLAN    Visit Diagnoses and Associated Orders       Intractable migraine without status migrainosus, unspecified migraine type    -  Primary    propranolol (INDERAL LA) 60 MG extended release capsule [[id number]]      Ubrogepant (UBRELVY) 100 MG TABS [[id number]]           Primary hypertension        propranolol (INDERAL LA) 60 MG extended release capsule [[id number]]                       Diagnosis Orders   1. Intractable migraine without status migrainosus, unspecified migraine type  propranolol (INDERAL LA) 60 MG extended release capsule    Ubrogepant (UBRELVY) 100 MG TABS      2. Primary hypertension  propranolol (INDERAL LA) 60 MG extended release capsule      , Sri was seen today for discuss labs and migraine. Diagnoses and all orders for this visit:    Intractable migraine without status migrainosus, unspecified migraine type  -     propranolol (INDERAL LA) 60 MG extended release capsule; Take 1 capsule by mouth daily  -     Ubrogepant (UBRELVY) 100 MG TABS; Take 100 mg by mouth as needed (for migraine headache, may repeat dose in 2 hours as needed. Max 200mg/ day)    Primary hypertension  -     propranolol (INDERAL LA) 60 MG extended release capsule;  Take 1 capsule by mouth daily  , We will start her on propranolol LA which will help for both her migraine prevention and this will hopefully help for her blood pressure we will see her back in 2 to 3 weeks in the meantime when she does have migraines to continue using Ubrelvy since it works better and Imitrex seems to make it worse reminded her the importance of keeping her cardiology appointment

## 2022-10-21 DIAGNOSIS — R09.82 POST-NASAL DRIP: ICD-10-CM

## 2022-10-21 DIAGNOSIS — I10 UNCONTROLLED HYPERTENSION: ICD-10-CM

## 2022-10-21 DIAGNOSIS — J34.89 SINUS PRESSURE: ICD-10-CM

## 2022-10-21 DIAGNOSIS — R09.81 NASAL CONGESTION: ICD-10-CM

## 2022-10-24 RX ORDER — FLUTICASONE PROPIONATE 50 MCG
SPRAY, SUSPENSION (ML) NASAL
OUTPATIENT
Start: 2022-10-24

## 2022-10-24 RX ORDER — SPIRONOLACTONE 50 MG/1
TABLET, FILM COATED ORAL
Qty: 90 TABLET | OUTPATIENT
Start: 2022-10-24

## 2022-11-10 ENCOUNTER — OFFICE VISIT (OUTPATIENT)
Dept: CARDIOLOGY CLINIC | Age: 37
End: 2022-11-10
Payer: COMMERCIAL

## 2022-11-10 VITALS
DIASTOLIC BLOOD PRESSURE: 120 MMHG | SYSTOLIC BLOOD PRESSURE: 160 MMHG | BODY MASS INDEX: 33.75 KG/M2 | HEART RATE: 82 BPM | HEIGHT: 66 IN | WEIGHT: 210 LBS

## 2022-11-10 DIAGNOSIS — I47.1 SVT (SUPRAVENTRICULAR TACHYCARDIA) (HCC): Primary | ICD-10-CM

## 2022-11-10 DIAGNOSIS — I10 PRIMARY HYPERTENSION: ICD-10-CM

## 2022-11-10 DIAGNOSIS — R00.2 PALPITATIONS: ICD-10-CM

## 2022-11-10 DIAGNOSIS — G43.919 INTRACTABLE MIGRAINE WITHOUT STATUS MIGRAINOSUS, UNSPECIFIED MIGRAINE TYPE: ICD-10-CM

## 2022-11-10 PROCEDURE — 1036F TOBACCO NON-USER: CPT | Performed by: INTERNAL MEDICINE

## 2022-11-10 PROCEDURE — 99214 OFFICE O/P EST MOD 30 MIN: CPT | Performed by: INTERNAL MEDICINE

## 2022-11-10 PROCEDURE — 3074F SYST BP LT 130 MM HG: CPT | Performed by: INTERNAL MEDICINE

## 2022-11-10 PROCEDURE — G8484 FLU IMMUNIZE NO ADMIN: HCPCS | Performed by: INTERNAL MEDICINE

## 2022-11-10 PROCEDURE — G8417 CALC BMI ABV UP PARAM F/U: HCPCS | Performed by: INTERNAL MEDICINE

## 2022-11-10 PROCEDURE — G8427 DOCREV CUR MEDS BY ELIG CLIN: HCPCS | Performed by: INTERNAL MEDICINE

## 2022-11-10 PROCEDURE — 3078F DIAST BP <80 MM HG: CPT | Performed by: INTERNAL MEDICINE

## 2022-11-10 RX ORDER — DILTIAZEM HYDROCHLORIDE 300 MG/1
300 CAPSULE, COATED, EXTENDED RELEASE ORAL DAILY
Qty: 90 CAPSULE | Refills: 3 | Status: SHIPPED | OUTPATIENT
Start: 2022-11-10

## 2022-11-10 RX ORDER — PROPRANOLOL HCL 60 MG
60 CAPSULE, EXTENDED RELEASE 24HR ORAL 2 TIMES DAILY
Qty: 90 CAPSULE | Refills: 1 | Status: SHIPPED | OUTPATIENT
Start: 2022-11-10

## 2022-11-10 NOTE — PROGRESS NOTES
[address], [address]  PHONE: [phone]     11/10/22    NAME:  Hernando Ureña  : 1985  MRN: [id number]       SUBJECTIVE:   Hernando Ureña is a 39 y.o. female seen for a follow up visit regarding the following:     Chief Complaint   Patient presents with    Tachycardia      HPI: Here for HTN and SVT. Here for SVT hx, ablation in . Prior PE in  after child birth. Echo/SE 2019: normal EF, normal SE. (She underwent in 2016 an EP study with SVT mapping and ablation with Dr. Freddy Tompkins. 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 Echo at that time showed normal LVEF. Sent for sleep study which ultimately was normal.  EPS  with no inducible SVT. Holter 2018 at [facility]: · Sinus rhythm with no ectopy recorded, reported symptoms do not have a clear heart rate correlate). CT 2020:  No evidence of obstructive atherosclerotic coronary artery disease. Echo 2021: normal EF. Monitor 2022: sinus with flutter beats. Avg HR 87bpm.       Exercising. Feeling well exercising 2x per week with , feeling well exercising. No angina exercising at all. On dilt now, HCTZ, aldactone. HR and BP higher, feeling upset today about BP. Some CP at times. Patient denies recent history of orthopnea, PND, excessive dizziness and/or syncope. Prior partial hysterectomy. Past Medical History, Past Surgical History, Family history, Social History, and Medications were all reviewed with the patient today and updated as necessary.      Current Outpatient Medications   Medication Sig Dispense Refill    dilTIAZem (CARDIZEM CD) 300 MG extended release capsule Take 1 capsule by mouth daily 90 capsule 3    propranolol (INDERAL LA) 60 MG extended release capsule Take 1 capsule by mouth in the morning and at bedtime 90 capsule 1    Ubrogepant (UBRELVY) 100 MG TABS Take 100 mg by mouth as needed (for migraine headache, may repeat dose in 2 hours as needed. Max 200mg/ day) 10 tablet 2    spironolactone (ALDACTONE) 50 MG tablet Take 1 tablet by mouth daily 30 tablet 2    EPINEPHrine (EPIPEN) 0.3 MG/0.3ML SOAJ injection Inject 0.3 mg into the muscle once as needed      azelastine (ASTELIN) 0.1 % nasal spray 1 spray by Nasal route 2 times daily In addition to fluticasone nasal spray 60 mL 5    fluticasone (FLONASE) 50 MCG/ACT nasal spray 1 spray by Each Nostril route in the morning and at bedtime In addition to astelin nasal spray 3 each 3    fluticasone (FLOVENT HFA) 220 MCG/ACT inhaler Inhale 2 puffs into the lungs 2 times daily      hydroCHLOROthiazide (HYDRODIURIL) 25 MG tablet Take 25 mg by mouth daily      HYDROcodone-acetaminophen (NORCO)  MG per tablet 1 TABLET BY MOUTH ORALLY EVERY 8 HOURS 30 DAYS      Hyoscyamine Sulfate SL 0.125 MG SUBL Place 0.125 mg under the tongue every 4 hours as needed      linaclotide (LINZESS) 290 MCG CAPS capsule Take 290 mcg by mouth every morning (before breakfast)      potassium chloride (KLOR-CON M) 20 MEQ extended release tablet Take 1 tablet by mouth daily for 2 days 2 tablet 0     No current facility-administered medications for this visit.         Allergies   Allergen Reactions    Shellfish Allergy Anaphylaxis and Hives    Shrimp Extract Allergy Skin Test Hives     facial hives and throat closing sensation     Patient Active Problem List    Diagnosis Date Noted    Hypokalemia 10/01/2022     Priority: Medium    Anxiety 08/24/2017     Priority: Medium    Periumbilical abdominal pain 02/14/2017     Priority: Medium    H/O hysterectomy for benign disease 12/09/2016     Priority: Medium    Edema 10/05/2016     Priority: Medium    Primary stabbing headache 09/28/2016     Priority: Medium     Last Assessment & Plan:   Formatting of this note might be different from the original.  Patient with bilateral  frontal headache and benign cranial nerve exam. Imitrex appears to not be helping patient at this time. Seen by neurologist in the past and recommended shots but did not follow-up. We'll start patient on prophylactic Topamax to see if that improves daily headaches as Imitrex appears to not help the daily headaches. With her history of iron deficiency anemia will get a CBC with iron studies as well. May consider imaging if Topamax does not improve symptoms.       Umbilical hernia without obstruction and without gangrene 2016     Priority: Medium    Right shoulder pain 2016     Priority: Medium    History of pulmonary embolus (PE) 2020    Dyspareunia due to medical condition in female patient 2019    Chronic constipation 2019    History of endometriosis 2019    Estrogen deficiency 2019    Estrogen deficient vulvovaginitis 2019    Scoliosis of lumbosacral region due to degenerative disease of spine in adult 2019    Chronic fatigue 2019    Precordial pain 2019    Shortness of breath 2019    IC (interstitial cystitis) 2018    Carpal tunnel syndrome, bilateral 2018    Paresthesia and pain of right extremity 2018    Varicella     Migraine     PONV (postoperative nausea and vomiting)     Meniere's disease     Pulmonary embolus ([address]     Kidney stones     IBS (irritable bowel syndrome)     Palpitations     Hypertension     SVT (supraventricular tachycardia) ([address]     Anemia     Arrhythmia 10/01/2015     Supraventricular tachycardia          Past Surgical History:   Procedure Laterality Date    ABLATION OF DYSRHYTHMIC FOCUS  02/15/2016    ABLATION OF DYSRHYTHMIC FOCUS  2017     SECTION      X3    COLONOSCOPY N/A 2017    COLONOSCOPY BMI 30 performed by Juliana Pena MD at [facility] ENDOSCOPY    COLONOSCOPY N/A 6/15/2020    COLONOSCOPY/BMI 33 performed by Alba Garza MD at [facility] ENDOSCOPY    COLONOSCOPY W/BIOPSY SINGLE/MULTIPLE  2017         HEENT Left 03/10/2022    L ear tube placement- Issac Hart HERNIA REPAIR  2009 and 40/1079    umbilical hernia X2    HYSTERECTOMY (CERVIX STATUS UNKNOWN)  10/2016    unilateral salping-ooph    MYOMECTOMY      ROTATOR CUFF REPAIR Right 2014    TUBAL LIGATION  2015    UROLOGICAL SURGERY  12/17/2018    CYSTOSCOPY HYDRODISTENTION OF BLADDER, urethral dilitaion     Family History   Problem Relation Age of Onset    No Known Problems Father     Crohn's Disease Sister         pt reports Crohn's and UC (?)    Crohn's Disease Maternal Grandmother     Colon Cancer Maternal Grandfather     Ulcerative Colitis Mother         also Castleman's disease     Social History     Tobacco Use    Smoking status: Never     Passive exposure: Never    Smokeless tobacco: Never   Substance Use Topics    Alcohol use: Not Currently         ROS:    No obvious pertinent positives on review of systems except for what was outlined in the HPI today.       PHYSICAL EXAM:     BP (!) 160/120   Pulse 82   Ht 5' 6\" (1.676 m)   Wt 210 lb (95.3 kg)   BMI 33.89 kg/m²    General/Constitutional:   Alert and oriented x 3, no acute distress  HEENT:   normocephalic, atraumatic, moist mucous membranes  Neck:   No JVD or carotid bruits bilaterally  Cardiovascular:   regular rate and rhythm, no murmur/rub/gallop appreciated  Pulmonary:   clear to auscultation bilaterally, no respiratory distress  Abdomen:   soft, non-tender, non-distended  Ext:   No sig LE edema bilaterally  Skin:  warm and dry, no obvious rashes seen  Neuro:   no obvious sensory or motor deficits  Psychiatric:   normal mood and affect      Lab Results   Component Value Date/Time     10/04/2022 09:13 AM    K 3.5 10/04/2022 09:13 AM     10/04/2022 09:13 AM    CO2 29 10/04/2022 09:13 AM    BUN 9 10/04/2022 09:13 AM    CREATININE 0.90 10/04/2022 09:13 AM    GLUCOSE 81 10/04/2022 09:13 AM    CALCIUM 8.9 10/04/2022 09:13 AM        Lab Results   Component Value Date    WBC 5.1 09/27/2022    HGB 14.1 09/27/2022    HCT 42.3 09/27/2022    MCV 87.6 09/27/2022     09/27/2022       Lab Results   Component Value Date    TSH 1.010 11/23/2021       No results found for: LABA1C  No results found for: EAG    Lab Results   Component Value Date    CHOL 160 01/24/2022     Lab Results   Component Value Date    TRIG 112 01/24/2022     Lab Results   Component Value Date    HDL 39 (L) 01/24/2022     Lab Results   Component Value Date    LDLCALC 101 (H) 01/24/2022     Lab Results   Component Value Date    VLDL 20 01/24/2022     No results found for: CHOLHDLRATIO        I have Independently reviewed prior care notes, any ER records available, cardiac testing, labs and results with the patient and before seeing the patient today. Also independently reviewed outside records when available. ASSESSMENT:    Thong Sutton was seen today for tachycardia. Diagnoses and all orders for this visit:    SVT (supraventricular tachycardia) ([address]    Primary hypertension  -     propranolol (INDERAL LA) 60 MG extended release capsule; Take 1 capsule by mouth in the morning and at bedtime    Palpitations    Intractable migraine without status migrainosus, unspecified migraine type  -     propranolol (INDERAL LA) 60 MG extended release capsule; Take 1 capsule by mouth in the morning and at bedtime    Other orders  -     dilTIAZem (CARDIZEM CD) 300 MG extended release capsule; Take 1 capsule by mouth daily        PLAN:      1. SVT: on dilt. Follow HR and BP at home. Can increase propranolol to BID as needed. Follow. Increase dilt to 300. Stress mgmt key for her. She is tearful today. 2. HTN: dilt qhs. Take AM aldactone and HCTZ. On K as well. Follow K, get back on Vit D. Follow labs, more labs soon. Recommend plant based diet. Increase exercise to 4-5 times per week. She was 98 pds before 3 kids, 2005. Goal of 160pds now. She wants diet and exercise plan. Follow HR with .           Patient has been instructed and agrees to call our office with any issues or other concerns related to their cardiac condition(s) and/or complaint(s). Return in about 3 months (around 2/10/2023).        SULAIMAN ADHIKARI DO  11/10/2022

## 2022-11-21 ENCOUNTER — OFFICE VISIT (OUTPATIENT)
Dept: ENT CLINIC | Age: 37
End: 2022-11-21
Payer: MEDICARE

## 2022-11-21 VITALS — BODY MASS INDEX: 34.72 KG/M2 | WEIGHT: 216 LBS | RESPIRATION RATE: 18 BRPM | HEIGHT: 66 IN

## 2022-11-21 DIAGNOSIS — Z45.89 TYMPANOSTOMY TUBE CHECK: Primary | ICD-10-CM

## 2022-11-21 PROCEDURE — 1036F TOBACCO NON-USER: CPT | Performed by: OTOLARYNGOLOGY

## 2022-11-21 PROCEDURE — 99213 OFFICE O/P EST LOW 20 MIN: CPT | Performed by: OTOLARYNGOLOGY

## 2022-11-21 PROCEDURE — G8428 CUR MEDS NOT DOCUMENT: HCPCS | Performed by: OTOLARYNGOLOGY

## 2022-11-21 PROCEDURE — G8484 FLU IMMUNIZE NO ADMIN: HCPCS | Performed by: OTOLARYNGOLOGY

## 2022-11-21 PROCEDURE — G8417 CALC BMI ABV UP PARAM F/U: HCPCS | Performed by: OTOLARYNGOLOGY

## 2022-11-21 ASSESSMENT — ENCOUNTER SYMPTOMS
ABDOMINAL PAIN: 0
SHORTNESS OF BREATH: 0

## 2022-11-21 NOTE — PROGRESS NOTES
Chief Complaint   Patient presents with    Follow-up     Patient states that her ears are still the same. HPI:  Hernando Ureña is a 39 y.o. female seen in follow-up for her ears. She is s/p L ear tube placement back on 3/10/22. Last seen on 4/18/22. Today, she is doing about the same with some continued fullness in the left ear. Her preoperative audiogram revealed no evidence of conductive hearing loss. There has been some mild otalgia over the last couple days in both ears. No otorrhea or bleeding from either ear. Past Medical History, Past Surgical History, Family history, Social History, and Medications were all reviewed with the patient today and updated as necessary.      Allergies   Allergen Reactions    Shellfish Allergy Anaphylaxis and Hives    Shrimp Extract Allergy Skin Test Hives     facial hives and throat closing sensation     Patient Active Problem List   Diagnosis    Dyspareunia due to medical condition in female patient    Chronic constipation    Varicella    Migraine    Chronic fatigue    Palpitations    PONV (postoperative nausea and vomiting)    History of endometriosis    Meniere's disease    History of pulmonary embolus (PE)    Pulmonary embolus (HCC)    Estrogen deficiency    Arrhythmia    Hypertension    Estrogen deficient vulvovaginitis    Precordial pain    IC (interstitial cystitis)    SVT (supraventricular tachycardia) (HCC)    Paresthesia and pain of right extremity    Shortness of breath    Anemia    Kidney stones    Carpal tunnel syndrome, bilateral    IBS (irritable bowel syndrome)    Scoliosis of lumbosacral region due to degenerative disease of spine in adult    Anxiety    Edema    H/O hysterectomy for benign disease    Periumbilical abdominal pain    Primary stabbing headache    Right shoulder pain    Umbilical hernia without obstruction and without gangrene    Hypokalemia     Current Outpatient Medications   Medication Sig    dilTIAZem (CARDIZEM CD) 300 MG extended release capsule Take 1 capsule by mouth daily    propranolol (INDERAL LA) 60 MG extended release capsule Take 1 capsule by mouth in the morning and at bedtime    Ubrogepant (UBRELVY) 100 MG TABS Take 100 mg by mouth as needed (for migraine headache, may repeat dose in 2 hours as needed. Max 200mg/ day)    potassium chloride (KLOR-CON M) 20 MEQ extended release tablet Take 1 tablet by mouth daily for 2 days    spironolactone (ALDACTONE) 50 MG tablet Take 1 tablet by mouth daily    EPINEPHrine (EPIPEN) 0.3 MG/0.3ML SOAJ injection Inject 0.3 mg into the muscle once as needed    azelastine (ASTELIN) 0.1 % nasal spray 1 spray by Nasal route 2 times daily In addition to fluticasone nasal spray    fluticasone (FLONASE) 50 MCG/ACT nasal spray 1 spray by Each Nostril route in the morning and at bedtime In addition to astelin nasal spray    fluticasone (FLOVENT HFA) 220 MCG/ACT inhaler Inhale 2 puffs into the lungs 2 times daily    hydroCHLOROthiazide (HYDRODIURIL) 25 MG tablet Take 25 mg by mouth daily    HYDROcodone-acetaminophen (NORCO)  MG per tablet 1 TABLET BY MOUTH ORALLY EVERY 8 HOURS 30 DAYS    Hyoscyamine Sulfate SL 0.125 MG SUBL Place 0.125 mg under the tongue every 4 hours as needed    linaclotide (LINZESS) 290 MCG CAPS capsule Take 290 mcg by mouth every morning (before breakfast)     No current facility-administered medications for this visit.      Past Medical History:   Diagnosis Date    Acute dysfunction of both eustachian tubes 03/2022    Anemia     denies hx of blood transfusions    Bladder pain     Chronic lower back pain     Chronic pain     back pain-- prn meds    History of kidney stones     with pregnancy- naturally pass    Hypertension     manged with meds    IBS (irritable bowel syndrome)     Interstitial cystitis     Meniere's disease     Migraines     pt denies    Palpitations     Paresthesia and pain of right extremity 6/12/2018    PONV (postoperative nausea and vomiting)      does well with IV antiemetic    Pulmonary embolus ([address]     3 days  s/p  C/S     SVT (supraventricular tachycardia) ([address]     2016 & 2017 - Now see's Dr Veria Duane @ [facility]    Varicella      Social History     Tobacco Use    Smoking status: Never     Passive exposure: Never    Smokeless tobacco: Never   Substance Use Topics    Alcohol use: Not Currently     Past Surgical History:   Procedure Laterality Date    ABLATION OF DYSRHYTHMIC FOCUS  02/15/2016    ABLATION OF DYSRHYTHMIC FOCUS  2017     SECTION      X3    COLONOSCOPY N/A 2017    COLONOSCOPY BMI 30 performed by Simone Alexis MD at [address] N/A 6/15/2020    COLONOSCOPY/BMI 33 performed by Norberto Christopher MD at [facility] ENDOSCOPY    COLONOSCOPY W/BIOPSY SINGLE/MULTIPLE  2017         HEENT Left 03/10/2022    L ear tube placement- [address] [facility] [facility]   and     umbilical hernia X2    HYSTERECTOMY (CERVIX STATUS UNKNOWN)  10/2016    unilateral salping-ooph    MYOMECTOMY      ROTATOR CUFF REPAIR Right 2014    TUBAL LIGATION  2015    UROLOGICAL SURGERY  2018    CYSTOSCOPY HYDRODISTENTION OF BLADDER, urethral dilitaion     Family History   Problem Relation Age of Onset    No Known Problems Father     Crohn's Disease Sister         pt reports Crohn's and UC (?)    Crohn's Disease Maternal Grandmother     Colon Cancer Maternal Grandfather     Ulcerative Colitis Mother         also Castleman's disease        ROS:    Review of Systems   Constitutional:  Negative for fever. Eyes:  Negative for visual disturbance. Respiratory:  Negative for shortness of breath. Cardiovascular:  Negative for chest pain. Gastrointestinal:  Negative for abdominal pain. Skin:  Negative for rash. Neurological:  Negative for dizziness and headaches. Hematological:  Negative for adenopathy. Psychiatric/Behavioral:  Negative for agitation.        PHYSICAL EXAM:    Resp 18   Ht 5' 6\" (1.676 m)   Wt 216 lb (98 kg)   BMI 34.86 kg/m²     General: NAD, well-appearing  Neuro: No gross neuro deficits. No facial weakness. Eyes: No periorbital edema/ecchymosis. No nystagmus. Skin: No facial erythema, rashes or concerning lesions. Nose: No external deviations or saddling. Intranasally, septum is midline without perforations, nasal mucosa appears healthy with no erythema, mucopurulence, or polyps. Mouth: Moist mucus membranes, normal tongue/palate mobility, no concerning mucosal lesions. Oropharynx clear with no erythema/exudate, no tonsillar hypertrophy. Ears: Normal appearing auricles, no hematomas. EACs clear with no cerumen impaction, healthy canal skin, R side- intact TM, clear ME space, L side- patent and well-positioned RB tube, clear ME space. Neck: Soft, supple, no palpable neck masses. No palpable parotid or submandibular masses. No thyromegaly or palpable thyroid nodules. Lymphatics: No palpable cervical LAD. Resp: No audible stridor or wheezing. CV: No murmurs, no JVD. Extremities: No clubbing or cyanosis. ASSESSMENT and PLAN      ICD-10-CM    1. Tympanostomy tube check  Z45.89         Her left tube remains patent and in good position. She still complains of some left-sided ear fullness, despite a healthy ear tube in place. She will continue to monitor her symptoms and I will see her back in 4 months for another tube check.     Samantha Lieberman MD  11/21/2022    Electronically signed by Samantha Lieberman MD on 11/21/2022 at 6:45 PM

## 2022-11-30 ENCOUNTER — OFFICE VISIT (OUTPATIENT)
Dept: UROLOGY | Age: 37
End: 2022-11-30
Payer: MEDICARE

## 2022-11-30 DIAGNOSIS — R39.89 BLADDER PAIN: ICD-10-CM

## 2022-11-30 DIAGNOSIS — N30.10 INTERSTITIAL CYSTITIS: Primary | ICD-10-CM

## 2022-11-30 LAB
BILIRUBIN, URINE, POC: NEGATIVE
BLOOD URINE, POC: NORMAL
GLUCOSE URINE, POC: NEGATIVE
KETONES, URINE, POC: NEGATIVE
LEUKOCYTE ESTERASE, URINE, POC: NEGATIVE
NITRITE, URINE, POC: NEGATIVE
PH, URINE, POC: 7 (ref 4.6–8)
PROTEIN,URINE, POC: NEGATIVE
SPECIFIC GRAVITY, URINE, POC: 1.02 (ref 1–1.03)
URINALYSIS CLARITY, POC: NORMAL
URINALYSIS COLOR, POC: NORMAL
UROBILINOGEN, POC: NORMAL

## 2022-11-30 PROCEDURE — G8417 CALC BMI ABV UP PARAM F/U: HCPCS | Performed by: NURSE PRACTITIONER

## 2022-11-30 PROCEDURE — G8484 FLU IMMUNIZE NO ADMIN: HCPCS | Performed by: NURSE PRACTITIONER

## 2022-11-30 PROCEDURE — 81003 URINALYSIS AUTO W/O SCOPE: CPT | Performed by: NURSE PRACTITIONER

## 2022-11-30 PROCEDURE — 1036F TOBACCO NON-USER: CPT | Performed by: NURSE PRACTITIONER

## 2022-11-30 PROCEDURE — G8427 DOCREV CUR MEDS BY ELIG CLIN: HCPCS | Performed by: NURSE PRACTITIONER

## 2022-11-30 PROCEDURE — 99214 OFFICE O/P EST MOD 30 MIN: CPT | Performed by: NURSE PRACTITIONER

## 2022-11-30 RX ORDER — METHENAMINE, SODIUM PHOSPHATE MONOBASIC, PHENYL SALICYLATE, METHYLENE BLUE, HYOSCYAMINE SULFATE 81.6; 40.8; 36.2; 10.8; .12 MG/1; MG/1; MG/1; MG/1; MG/1
TABLET ORAL
Qty: 120 TABLET | Refills: 3 | Status: SHIPPED | OUTPATIENT
Start: 2022-11-30

## 2022-11-30 ASSESSMENT — ENCOUNTER SYMPTOMS: NAUSEA: 0

## 2022-11-30 NOTE — PROGRESS NOTES
[facility]  [address]    [place] [address], [address] [facility]  [phone]          Larissa Forrest  : 1985    Chief Complaint   Patient presents with    Follow-up          HPI     Larissa Forrest is a 39 y.o. female previously followed by Dr Zoraida White. Prior to this, she was followed by Rnonie Hogan NP [facility]. H/O IC, recurrent UTI, and renal stones. H/O partial hysterectomy. Has some degenerative disease of the spine. She also has irritable bowels been on Linzess. She previously tried Elmiron and Myrbetriq wo change in LUTS. She had a CT scan in 2017 did not show any  pathology. S/P cystoscopy, hydrodistention of the bladder and urethral dilatation (). This helped her sx mildly. She has previously had some relief w instills of the bladder. Heddie Sparrow has been most helpful. Her biggest dietary triggers include spaghetti and spicy chicken. She was seen for bladder pain and gross hematuria . ?IC flare. Sx cont to worsen. Seen back in office . CT a/p hematuria protocol showed NO etiology for hematuria. NO renal stones. Underwent cysto/hydrodistention on 21 which revealed IC. Urine cytology also sent which was negative for malignant cells. Today she reports cont bladder pain and urinary frequency. occ urinary hesitancy. Most relieved by Heddie Sparrow. Here to discuss other tx options.       Past Medical History:   Diagnosis Date    Acute dysfunction of both eustachian tubes 2022    Anemia     denies hx of blood transfusions    Bladder pain     Chronic lower back pain     Chronic pain     back pain-- prn meds    History of kidney stones     with pregnancy- naturally pass    Hypertension     manged with meds    IBS (irritable bowel syndrome)     Interstitial cystitis     Meniere's disease     Migraines     pt denies    Palpitations     Paresthesia and pain of right extremity 2018    PONV (postoperative nausea and vomiting) does well with IV antiemetic    Pulmonary embolus ([address]     3 days  s/p  C/S     SVT (supraventricular tachycardia) [facility]      & 2017 - Now see's Dr Erven Holstein @ [facility]    Varicella      Past Surgical History:   Procedure Laterality Date    ABLATION OF DYSRHYTHMIC FOCUS  02/15/2016    ABLATION OF DYSRHYTHMIC FOCUS  2017     SECTION      X3    COLONOSCOPY N/A 2017    COLONOSCOPY BMI 30 performed by Roe James MD at [facility] ENDOSCOPY    COLONOSCOPY N/A 6/15/2020    COLONOSCOPY/BMI 33 performed by Suhail Kramer MD at [facility] ENDOSCOPY    COLONOSCOPY W/BIOPSY SINGLE/MULTIPLE  2017         HEENT Left 03/10/2022    L ear tube placement- [facility]    HERNIA REPAIR   and     umbilical hernia X2    HYSTERECTOMY (CERVIX STATUS UNKNOWN)  10/2016    unilateral salping-ooph    MYOMECTOMY      ROTATOR CUFF REPAIR Right 2014    TUBAL LIGATION  2015    UROLOGICAL SURGERY  2018    CYSTOSCOPY HYDRODISTENTION OF BLADDER, urethral dilitaion     Current Outpatient Medications   Medication Sig Dispense Refill    Meth-Hyo-M Bl-Na Phos-Ph Sal (METHENAMINE-NABISPHOSPHATE-PHENYL SALICYLATE-METHYLENE BLUE-HYOSCYAMINE) 81.6 MG TABS tablet Take 1 tablet by mouth 4 times daily as needed. 120 tablet 3    dilTIAZem (CARDIZEM CD) 300 MG extended release capsule Take 1 capsule by mouth daily 90 capsule 3    propranolol (INDERAL LA) 60 MG extended release capsule Take 1 capsule by mouth in the morning and at bedtime 90 capsule 1    Ubrogepant (UBRELVY) 100 MG TABS Take 100 mg by mouth as needed (for migraine headache, may repeat dose in 2 hours as needed.   Max 200mg/ day) 10 tablet 2    spironolactone (ALDACTONE) 50 MG tablet Take 1 tablet by mouth daily 30 tablet 2    EPINEPHrine (EPIPEN) 0.3 MG/0.3ML SOAJ injection Inject 0.3 mg into the muscle once as needed      azelastine (ASTELIN) 0.1 % nasal spray 1 spray by Nasal route 2 times daily In addition to fluticasone nasal spray 60 mL 5    fluticasone (FLONASE) 50 MCG/ACT nasal spray 1 spray by Each Nostril route in the morning and at bedtime In addition to astelin nasal spray 3 each 3    fluticasone (FLOVENT HFA) 220 MCG/ACT inhaler Inhale 2 puffs into the lungs 2 times daily      hydroCHLOROthiazide (HYDRODIURIL) 25 MG tablet Take 25 mg by mouth daily      HYDROcodone-acetaminophen (NORCO)  MG per tablet 1 TABLET BY MOUTH ORALLY EVERY 8 HOURS 30 DAYS      Hyoscyamine Sulfate SL 0.125 MG SUBL Place 0.125 mg under the tongue every 4 hours as needed      linaclotide (LINZESS) 290 MCG CAPS capsule Take 290 mcg by mouth every morning (before breakfast)      potassium chloride (KLOR-CON M) 20 MEQ extended release tablet Take 1 tablet by mouth daily for 2 days 2 tablet 0     No current facility-administered medications for this visit.      Allergies   Allergen Reactions    Shellfish Allergy Anaphylaxis and Hives    Shrimp Extract Allergy Skin Test Hives     facial hives and throat closing sensation     Social History     Socioeconomic History    Marital status:      Spouse name: Not on file    Number of children: Not on file    Years of education: Not on file    Highest education level: Not on file   Occupational History    Not on file   Tobacco Use    Smoking status: Never     Passive exposure: Never    Smokeless tobacco: Never   Vaping Use    Vaping Use: Never used   Substance and Sexual Activity    Alcohol use: Not Currently    Drug use: No    Sexual activity: Not on file   Other Topics Concern    Not on file   Social History Narrative    Not on file     Social Determinants of Health     Financial Resource Strain: Not on file   Food Insecurity: Not on file   Transportation Needs: Not on file   Physical Activity: Not on file   Stress: Not on file   Social Connections: Not on file   Intimate Partner Violence: Not on file   Housing Stability: Not on file     Family History   Problem Relation Age of Onset    No Known Problems Father     Crohn's Disease Sister         pt reports Crohn's and UC (?)    Crohn's Disease Maternal Grandmother     Colon Cancer Maternal Grandfather     Ulcerative Colitis Mother         also Castleman's disease       Review of Systems  Constitutional:   Negative for fever. GI:  Negative for nausea. Genitourinary:  Negative for flank pain. Urinalysis  UA - Dipstick  Results for orders placed or performed in visit on 11/30/22   AMB POC URINALYSIS DIP STICK AUTO W/O MICRO   Result Value Ref Range    Color (UA POC)      Clarity (UA POC)      Glucose, Urine, POC Negative Negative    Bilirubin, Urine, POC Negative Negative    KETONES, Urine, POC Negative Negative    Specific Gravity, Urine, POC 1.025 1.001 - 1.035    Blood (UA POC) Small Negative    pH, Urine, POC 7.0 4.6 - 8.0    Protein, Urine, POC Negative Negative    Urobilinogen, POC 0.2 mg/dL     Nitrite, Urine, POC Negative Negative    Leukocyte Esterase, Urine, POC Negative Negative       UA - Micro  WBC - 0  RBC - 0  Bacteria - 0  Epith - 0    PHYSICAL EXAM    General appearance - well appearing and in no distress  Mental status - alert, oriented to person, place, and time  Neck - supple, no significant adenopathy   Heart-  Normal S1/S2, No murmurs  Chest/Lung-  Quiet, even and easy respiratory effort without use of accessory muscles, BS clear all lung fields  Skin - normal coloration and turgor, no rashes        Assessment and Plan    ICD-10-CM    1. Interstitial cystitis  N30.10 AMB POC URINALYSIS DIP STICK AUTO W/O MICRO     Meth-Hyo-M Bl-Na Phos-Ph Sal (METHENAMINE-NABISPHOSPHATE-PHENYL SALICYLATE-METHYLENE BLUE-HYOSCYAMINE) 81.6 MG TABS tablet      2. Bladder pain  R39.89         IC/bladder pain- urine normal. Discussed tx options including atarax 25 mg PO q hs vs levsin 0.125 mg PO q4hr PRN vs instills vs cysto/hydrodistention vs PFT. She opts for atarax 25 mg PO q hs and levsin 0.125 mg PO q 4 hr PRN. Cont Uribel po QID PRN.  IF this fails, she will contact me to schedule cysto/hydrodistention. Risks, benefits, and alternatives reviewed. Jc Bird, LINDSAYP, APRN - CNP  Dr. Parviz Centeno is supervising physician today and he approves plan of care.

## 2022-12-20 NOTE — PROGRESS NOTES
[address], [address] [place] [address]  Phone: [phone] Fax [phone]  Jeanna Boyd MS, APRN, FNP-C  9/27/2022   Chief Complaint   Patient presents with    Headache     Migraine HA off and on since this past Thursday, 9/22/22. Current migraine HA present since last night. PRN Imitrex does not work. PRN OTC Excedrin takes edge off but does not take away pain completely (last dose PRN OTC Excedrin was last PM per pt). Pt reports taking PRN Ubrelvy in past with relief, but reports needing refill. Photophobia     Pt reports having some photophobia, but denies any vision loss. Pt denies any focal weakness or neuro defs, dizziness, syncope or near syncope, chest pain or pressure, SOB, N/V/D/abd pain associated. Pt reports allergic rhinitis well controlled on current dose of Astelin and Flonase-denies any current symptoms of allergic rhinitis or sinusitis. Managed by ENT per pt. Hypertension     Pt reports taking her Spironolactone 25 mg po daily, HCTZ 25 mg po daily, and Diltiazem  mg po daily as directed for her HTN. Reports BP has been elevated over past few weeks when she is checking it 180's-190's/110's. HTN managed by [facility]. Pt has not called them to make f/u. Pt has a hx of SVT but had an ablation. Denies any current or recent palpitations (managed by [facility]). Pt's BP was elevated in office today at 178/112 (goal <140/90). Pulse normal rate 96 BPM.         Bleeding/Bruising     Pt reports finding small bruises on left chest and left leg over past few weeks. Denies prior hx of easy bruising. Does not take a blood thinners. Denies any known injury. LMP-partial hysterectomy per pt. ASSESSMENT/PLAN:  Below is the assessment and plan developed based on review of pertinent history, physical exam, labs, studies, and medications.     1. Intractable migraine without status migrainosus, unspecified migraine type  Migraine HA off and mg po daily and Diltiazem  mg po daily and increase Spironolactone to 50 mg po daily. DASH diet reinforced. Pt agrees to call [facility] today to make a close f/u appointment to evaluate for the need for any further medication adjustments given that they manage pt's HTN. Pt agrees to continue to monitor BP closely. Will have pt continue current dose of Astelin and Flonase per ENT for allergic rhinitis. Pt to f/u with her PCP-Dr. Kevin Medrano in 2 weeks. Dr. Kevin Medrano may want to consider referral to Neurology if pt continues to have frequent intractable migraine HA's. Pt agrees to call for any concerns and agrees to go to the ER for s/s of MI or CVA as discussed. - ketorolac (TORADOL) injection 15 mg  - CBC with Auto Differential; Future  - Comprehensive Metabolic Panel; Future  - Sedimentation Rate; Future  - Ubrogepant (UBRELVY) 100 MG TABS; Take 100 mg by mouth as needed (for migraine headache, may repeat dose in 2 hours as needed. Max 200mg/ day)  Dispense: 10 tablet; Refill: 2    2. Uncontrolled hypertension  See # 1 above. - Comprehensive Metabolic Panel; Future  - spironolactone (ALDACTONE) 50 MG tablet; Take 1 tablet by mouth daily  Dispense: 30 tablet; Refill: 2    3. SVT (supraventricular tachycardia) ([address]  See # 1 above. - Comprehensive Metabolic Panel; Future    4. Easy bruising  Pt reports finding small bruises on left chest and left leg over past few weeks. Denies prior hx of easy bruising. Does not take a blood thinners. Denies any known injury. Discussed with pt. Will check CBC today. If Plt count low, may consider referral to Hematology. Pt encouraged to cut back on PRN OTC Excedrin as this may be contributing. Pt to f/u with her PCP Dr. Kevin Medrano in 2 weeks. Pt agrees to call sooner for concerns/new or worsening symptoms.   - CBC with Auto Differential; Future    5. Allergic rhinitis, unspecified seasonality, unspecified trigger  See # 1 above.        Return in about 2 weeks (around 10/11/2022) for Dr. Berhane Layton to recheck migraines/BP. Call [facility] today to make f/u. ER severe symptoms. SUBJECTIVE/OBJECTIVE:    HPI-  Malika Wright (: 1985) is a 39 y.o. female, Established patient patient, here for evaluation of the following chief complaint(s):  Headache (Migraine HA off and on since this past Thursday, 22. Current migraine HA present since last night. PRN Imitrex does not work. PRN OTC Excedrin takes edge off but does not take away pain completely (last dose PRN OTC Excedrin was last PM per pt). Pt reports taking PRN Ubrelvy in past with relief, but reports needing refill. ), Photophobia (Pt reports having some photophobia, but denies any vision loss. Pt denies any focal weakness or neuro defs, dizziness, syncope or near syncope, chest pain or pressure, SOB, N/V/D/abd pain associated. Pt reports allergic rhinitis well controlled on current dose of Astelin and Flonase-denies any current symptoms of allergic rhinitis or sinusitis. Managed by ENT per pt.), Hypertension (Pt reports taking her Spironolactone 25 mg po daily, HCTZ 25 mg po daily, and Diltiazem  mg po daily as directed for her HTN. Reports BP has been elevated over past few weeks when she is checking it 180's-190's/110's. HTN managed by [facility]. Pt has not called them to make f/u. Pt has a hx of SVT but had an ablation. Denies any current or recent palpitations (managed by [facility]). Pt's BP was elevated in office today at 178/112 (goal <140/90). Pulse normal rate 96 BPM.   /), and Bleeding/Bruising (Pt reports finding small bruises on left chest and left leg over past few weeks. Denies prior hx of easy bruising. Does not take a blood thinners. Denies any known injury.  LMP-partial hysterectomy per pt. )     Allergies   Allergen Reactions    Shellfish Allergy Anaphylaxis and Hives    Shrimp Extract Allergy Skin Test Hives     facial hives and throat closing sensation [phone] [unfilled]  Past Medical History:   Diagnosis Date    Acute dysfunction of both eustachian tubes 2022    Anemia     denies hx of blood transfusions    Bladder pain     Chronic lower back pain     Chronic pain     back pain-- prn meds    History of kidney stones     with pregnancy- naturally pass    Hypertension     manged with meds    IBS (irritable bowel syndrome)     Interstitial cystitis     Meniere's disease     Migraines     pt denies    Palpitations     Paresthesia and pain of right extremity 2018    PONV (postoperative nausea and vomiting)      does well with IV antiemetic    Pulmonary embolus ([address]     3 days  s/p  C/S     SVT (supraventricular tachycardia) ([address]      & 2017 - Now see's Dr Shefali Augirre @ [facility]    Varicella      Past Surgical History:   Procedure Laterality Date    ABLATION OF DYSRHYTHMIC FOCUS  02/15/2016    ABLATION OF DYSRHYTHMIC FOCUS  2017     SECTION      X3    COLONOSCOPY N/A 2017    COLONOSCOPY BMI 30 performed by Jasmin Funez MD at [facility] ENDOSCOPY    COLONOSCOPY N/A 6/15/2020    COLONOSCOPY/BMI 33 performed by Phoebe Ambrose MD at [facility] ENDOSCOPY    COLONOSCOPY W/BIOPSY SINGLE/MULTIPLE  2017         HEENT Left 03/10/2022    L ear tube placement- [facility]   [address]    umbilical hernia X2    HYSTERECTOMY (CERVIX STATUS UNKNOWN)  10/2016    unilateral salping-ooph    MYOMECTOMY      ROTATOR CUFF REPAIR Right 2014    TUBAL LIGATION  2015    UROLOGICAL SURGERY  2018    CYSTOSCOPY HYDRODISTENTION OF BLADDER, urethral dilitaion     Family History   Problem Relation Age of Onset    No Known Problems Father     Crohn's Disease Sister         pt reports Crohn's and UC (?)    Crohn's Disease Maternal Grandmother     Colon Cancer Maternal Grandfather     Ulcerative Colitis Mother         also Castleman's disease     Social History     Tobacco Use   Smoking Status Never   Smokeless Tobacco Never         Review of Systems Constitutional: Negative. Negative for appetite change, chills, diaphoresis, fatigue, fever and unexpected weight change. HENT:  Negative for congestion, dental problem, drooling, ear discharge, ear pain, facial swelling, hearing loss, mouth sores, nosebleeds, postnasal drip, rhinorrhea, sinus pressure, sinus pain, sneezing, sore throat, tinnitus, trouble swallowing and voice change. Pt reports allergic rhinitis well controlled on current dose of Astelin and Flonase-denies any current symptoms of allergic rhinitis or sinusitis. Managed by ENT per pt. Eyes:  Positive for photophobia (with migraine HA). Negative for pain, discharge, redness, itching and visual disturbance. Respiratory: Negative. Negative for apnea, cough, choking, chest tightness, shortness of breath, wheezing and stridor. Cardiovascular: Negative. Negative for chest pain, palpitations and leg swelling. Gastrointestinal: Negative. Negative for abdominal distention, abdominal pain, anal bleeding, blood in stool, constipation, diarrhea, nausea, rectal pain and vomiting. Endocrine: Negative. Genitourinary: Negative. Negative for decreased urine volume, difficulty urinating, dyspareunia, dysuria, flank pain, frequency, genital sores, hematuria, menstrual problem, pelvic pain, urgency, vaginal bleeding, vaginal discharge and vaginal pain. Musculoskeletal: Negative. Negative for arthralgias, back pain, gait problem, joint swelling, myalgias, neck pain and neck stiffness. Skin: Negative. Negative for color change, pallor, rash and wound. Allergic/Immunologic: Positive for environmental allergies. Pt reports allergic rhinitis well controlled on current dose of Astelin and Flonase-denies any current symptoms of allergic rhinitis or sinusitis. Managed by ENT per pt. Neurological:  Positive for headaches.  Negative for dizziness, tremors, seizures, syncope, facial asymmetry, speech difficulty, weakness, light-headedness and numbness. Migraine HA off and on since this past Thursday, 9/22/22. Current migraine HA present since last night. PRN Imitrex does not work. PRN OTC Excedrin takes edge off but does not take away pain completely (last dose PRN OTC Excedrin was last PM per pt). Pt reports taking PRN Ubrelvy in past with relief, but reports needing refill. Pt reports having some photophobia, but denies any vision loss. Pt denies any focal weakness or neuro defs, dizziness, syncope or near syncope, chest pain or pressure, SOB, N/V/D/abd pain associated. Pt reports allergic rhinitis well controlled on current dose of Astelin and Flonase-denies any current symptoms of allergic rhinitis or sinusitis. Managed by ENT per pt. Pt reports taking her Spironolactone 25 mg po daily, HCTZ 25 mg po daily, and Diltiazem  mg po daily as directed for her HTN. Reports BP has been elevated over past few weeks when she is checking it 180's-190's/110's. HTN managed by [facility]. Pt has not called them to make f/u. Pt has a hx of SVT but had an ablation. Denies any current or recent palpitations (managed by [facility]). Pt's BP was elevated in office today at 178/112 (goal <140/90). Pulse normal rate 96 BPM.     Hematological:  Negative for adenopathy. Bruises/bleeds easily (Pt reports finding small bruises on left chest and left leg over past few weeks. Denies prior hx of easy bruising. Does not take a blood thinners. Denies any known injury). Vitals:    09/27/22 1108   BP: (!) 178/112   Pulse: 96   Resp: 16   SpO2: 99%       Physical Exam  Vitals reviewed. Constitutional:       General: She is not in acute distress. Appearance: Normal appearance. She is obese. She is not ill-appearing, toxic-appearing or diaphoretic. HENT:      Head: Normocephalic and atraumatic. Right Ear: Tympanic membrane, ear canal and external ear normal. There is no impacted cerumen.       Left Ear: Tympanic membrane, ear canal and external ear normal. There is no impacted cerumen. Nose: Nose normal. No congestion or rhinorrhea. Comments: No sinus ttp     Mouth/Throat:      Mouth: Mucous membranes are moist.      Pharynx: Oropharynx is clear. No oropharyngeal exudate or posterior oropharyngeal erythema. Eyes:      General: No scleral icterus. Right eye: No discharge. Left eye: No discharge. Extraocular Movements: Extraocular movements intact. Conjunctiva/sclera: Conjunctivae normal.      Pupils: Pupils are equal, round, and reactive to light. Comments: Mild photophobia noted     Cardiovascular:      Rate and Rhythm: Normal rate and regular rhythm. Pulses: Normal pulses. Heart sounds: Normal heart sounds. No murmur heard. No friction rub. No gallop. Pulmonary:      Effort: Pulmonary effort is normal. No respiratory distress. Breath sounds: Normal breath sounds. No stridor. No wheezing, rhonchi or rales. Chest:      Chest wall: No tenderness. Abdominal:      General: Abdomen is flat. Bowel sounds are normal. There is no distension. Palpations: Abdomen is soft. Tenderness: There is no abdominal tenderness. There is no guarding. Musculoskeletal:         General: No swelling, tenderness, deformity or signs of injury. Normal range of motion. Cervical back: Normal range of motion and neck supple. No rigidity or tenderness. Right lower leg: No edema. Left lower leg: No edema. Comments: Gait steady and unassisted   Lymphadenopathy:      Cervical: No cervical adenopathy. Skin:     General: Skin is warm. Capillary Refill: Capillary refill takes less than 2 seconds. Coloration: Skin is not jaundiced or pale. Findings: Bruising (small bruise noted to left chest and left leg) present. No erythema, lesion or rash. Neurological:      General: No focal deficit present.       Mental Status: She is alert and oriented to person, place, and time. Cranial Nerves: No cranial nerve deficit. Sensory: No sensory deficit. Motor: No weakness. Coordination: Coordination normal.      Gait: Gait normal.   Psychiatric:         Mood and Affect: Mood normal.         Behavior: Behavior normal.         Thought Content:  Thought content normal.         Judgment: Judgment normal.            11:20 AM 11:20 AM   WBC      4.3 - 11.1 K/uL  5.9   RBC      4.05 - 5.2 M/uL  4.53   Hemoglobin Quant      11.7 - 15.4 g/dL  13.1   Hematocrit      35.8 - 46.3 %  39.6   MCV      79.6 - 97.8 FL  87.4   MCH      26.1 - 32.9 PG  28.9   MCHC      31.4 - 35.0 g/dL  33.1   RDW      11.9 - 14.6 %  12.6   Platelet Count      304 - 450 K/uL  249   MPV      9.4 - 12.3 FL  9.7   NRBC Absolute      0.0 - 0.2 K/uL  0.00   Differential Type        AUTOMATED   Neutrophils %      43 - 78 %  63   Lymphocyte %      13 - 44 %  30   Monocytes %      4.0 - 12.0 %  6   Eosinophils %      0.5 - 7.8 %  0 (L)   Basophils %      0.0 - 2.0 %  1   Immature Granulocytes      0.0 - 5.0 %  0   Neutrophils Absolute      1.7 - 8.2 K/UL  3.7   Lymphocytes Absolute      0.5 - 4.6 K/UL  1.8   Monocytes Absolute      0.1 - 1.3 K/UL  0.4   Eosinophils Absolute      0.0 - 0.8 K/UL  0.0   Basophils Absolute      0.0 - 0.2 K/UL  0.1   GRANULOCYTE ABSOLUTE COUNT      0.0 - 0.5 K/UL  0.0   Sodium      136 - 145 mmol/L 141    Potassium      3.5 - 5.1 mmol/L 3.5    Chloride      98 - 107 mmol/L 109 (H)    CO2      21 - 32 mmol/L 23    Anion Gap      7 - 16 mmol/L 9    Glucose, Random      65 - 100 mg/dL 134 (H)    BUN,BUNPL      6 - 23 MG/DL 10    Creatinine      0.6 - 1.0 MG/DL 1.00    GFR African American      >60 ml/min/1.73m2 >60    EGFR IF NonAfrican American      >60 ml/min/1.73m2 >60    CALCIUM, SERUM, 288104      8.3 - 10.4 MG/DL 8.8    Bilirubin      0.2 - 1.1 MG/DL 0.3    ALT      12 - 65 U/L 17    AST      15 - 37 U/L 11 (L)    Alk Phos      50 - 136 U/L 77    Total Protein 6.3 - 8.2 g/dL 7.8    Albumin      3.5 - 5.0 g/dL 3.8    Globulin      2.3 - 3.5 g/dL 4.0 (H)    Albumin/Globulin Ratio      1.2 - 3.5   1.0 (L)      Read Date Phone Pager   Marlen Weber Mon Apr 4, 2022  [phone]        CTA HEAD W CONTRAST: Patient Communication     Add Comments   Add Notifications      Radiation Dose Estimates    No radiation information found for this patient  Narrative   Title:  CT arteriogram of the neck and head. Indication: Headaches. History of migraine. Facial numbness. Technique: Axial images of the head were obtained after the uneventful    administration of intravenous iodinated contrast media. Contrast was used to   best identify the arterial structures. Images were reviewed on a separate, free   standing, three-dimensional workstation as per the referring physicians request.           .        All CT scans at this facility are performed using dose reduction/dose modulation   techniques, as appropriate the performed exam, including the following:    Automated Exposure Control; Adjustment of the mA and/or kV according to patient   size (this includes techniques or standardized protocols for targeted exams   where dose is matched to indication/reason for exam); and Use of Iterative   Reconstruction Technique. Comparison: None. Findings:            Right ICA: Patent       Right MCA: Patent   Right BRANDON: Patent       Left ICA: Patent       Left MCA: Patent   Left BRANDON: Patent       Right Vertebral: Patent   Left Vertebral: Patent   Dominance: Codominant   Basilar: Patent   Right PCA: Patent   Left PCA: Patent       Other Vascular: Negative   Fluid-filled left lateral and mastoid air cells, possibly sinusitis           Impression   1. Left frontal and mastoid sinus disease, otherwise unremarkable study. PLEASE NOTE:  This document has been produced using voice recognition software. Unrecognized errors in transcription may be present. On this date 9/27/2022 I have spent 30 minutes reviewing previous notes, test results and face to face with the patient discussing the diagnosis and importance of compliance with the treatment plan as well as documenting on the day of the visit. An electronic signature was used to authenticate this note.   -- SAADIA Lozada - NP

## 2023-01-23 ENCOUNTER — OFFICE VISIT (OUTPATIENT)
Dept: FAMILY MEDICINE CLINIC | Facility: CLINIC | Age: 38
End: 2023-01-23
Payer: MEDICARE

## 2023-01-23 VITALS
DIASTOLIC BLOOD PRESSURE: 86 MMHG | SYSTOLIC BLOOD PRESSURE: 130 MMHG | WEIGHT: 210 LBS | HEIGHT: 66 IN | BODY MASS INDEX: 33.75 KG/M2

## 2023-01-23 DIAGNOSIS — K42.9 UMBILICAL HERNIA WITHOUT OBSTRUCTION AND WITHOUT GANGRENE: Primary | ICD-10-CM

## 2023-01-23 DIAGNOSIS — L98.7 EXCESS SKIN OF ABDOMEN: ICD-10-CM

## 2023-01-23 DIAGNOSIS — I47.1 SVT (SUPRAVENTRICULAR TACHYCARDIA) (HCC): ICD-10-CM

## 2023-01-23 DIAGNOSIS — I26.99 PULMONARY EMBOLISM, OTHER, UNSPECIFIED CHRONICITY, UNSPECIFIED WHETHER ACUTE COR PULMONALE PRESENT (HCC): ICD-10-CM

## 2023-01-23 DIAGNOSIS — S39.011A STRAIN OF ABDOMINAL WALL, INITIAL ENCOUNTER: ICD-10-CM

## 2023-01-23 PROCEDURE — G8417 CALC BMI ABV UP PARAM F/U: HCPCS | Performed by: FAMILY MEDICINE

## 2023-01-23 PROCEDURE — 1036F TOBACCO NON-USER: CPT | Performed by: FAMILY MEDICINE

## 2023-01-23 PROCEDURE — 3079F DIAST BP 80-89 MM HG: CPT | Performed by: FAMILY MEDICINE

## 2023-01-23 PROCEDURE — 3075F SYST BP GE 130 - 139MM HG: CPT | Performed by: FAMILY MEDICINE

## 2023-01-23 PROCEDURE — G8484 FLU IMMUNIZE NO ADMIN: HCPCS | Performed by: FAMILY MEDICINE

## 2023-01-23 PROCEDURE — G8427 DOCREV CUR MEDS BY ELIG CLIN: HCPCS | Performed by: FAMILY MEDICINE

## 2023-01-23 PROCEDURE — 99214 OFFICE O/P EST MOD 30 MIN: CPT | Performed by: FAMILY MEDICINE

## 2023-01-23 RX ORDER — NAPROXEN 500 MG/1
500 TABLET ORAL 2 TIMES DAILY PRN
Qty: 60 TABLET | Refills: 0 | Status: SHIPPED | OUTPATIENT
Start: 2023-01-23

## 2023-01-23 ASSESSMENT — ENCOUNTER SYMPTOMS
RHINORRHEA: 0
COUGH: 0
SHORTNESS OF BREATH: 0
RECTAL PAIN: 0
ANAL BLEEDING: 0
BLOOD IN STOOL: 0
VOMITING: 0
DIARRHEA: 0
ABDOMINAL PAIN: 1
ABDOMINAL DISTENTION: 0
SINUS PAIN: 0
NAUSEA: 0
CONSTIPATION: 0

## 2023-01-23 ASSESSMENT — PATIENT HEALTH QUESTIONNAIRE - PHQ9
SUM OF ALL RESPONSES TO PHQ QUESTIONS 1-9: 0
SUM OF ALL RESPONSES TO PHQ QUESTIONS 1-9: 0
SUM OF ALL RESPONSES TO PHQ9 QUESTIONS 1 & 2: 0
2. FEELING DOWN, DEPRESSED OR HOPELESS: 0
1. LITTLE INTEREST OR PLEASURE IN DOING THINGS: 0
SUM OF ALL RESPONSES TO PHQ QUESTIONS 1-9: 0
SUM OF ALL RESPONSES TO PHQ QUESTIONS 1-9: 0

## 2023-01-23 NOTE — PROGRESS NOTES
PROGRESS NOTE    SUBJECTIVE:   Randa Kuo is a 44 y.o. female seen for a follow up visit regarding the following chief complaint:     Chief Complaint   Patient presents with    Hearing Problem     Umbilical hernia, pt started to feel pain last week            HPI  Patient presents office complaining of an umbilical hernia that started last week on further questioning she has an umbilical hernia years ago it was fixed and then years later had a fixed again states that she was moving some things last week and her stomach around her bellybutton started to hurt does not admit to any type of hernia showing. Patient also wants to be seen by plastic surgeon to have excessive skin around her abdomen remove panniculectomy/tummy tuck (abdominoplasty)    Past Medical History, Past Surgical History, Family history, Social History, and Medications were all reviewed with the patient today and updated as necessary. Current Outpatient Medications   Medication Sig Dispense Refill    naproxen (NAPROSYN) 500 MG tablet Take 1 tablet by mouth 2 times daily as needed for Pain 60 tablet 0    dilTIAZem (CARDIZEM CD) 300 MG extended release capsule Take 1 capsule by mouth daily 90 capsule 3    spironolactone (ALDACTONE) 50 MG tablet Take 1 tablet by mouth daily 30 tablet 2    EPINEPHrine (EPIPEN) 0.3 MG/0.3ML SOAJ injection Inject 0.3 mg into the muscle once as needed      hydroCHLOROthiazide (HYDRODIURIL) 25 MG tablet Take 25 mg by mouth daily      HYDROcodone-acetaminophen (NORCO)  MG per tablet 1 TABLET BY MOUTH ORALLY EVERY 8 HOURS 30 DAYS      linaclotide (LINZESS) 290 MCG CAPS capsule Take 290 mcg by mouth every morning (before breakfast)      potassium chloride (KLOR-CON M) 20 MEQ extended release tablet Take 1 tablet by mouth daily for 2 days 2 tablet 0     No current facility-administered medications for this visit.      Allergies   Allergen Reactions    Shellfish Allergy Anaphylaxis and Hives    Shrimp Extract Allergy Skin Test Hives     facial hives and throat closing sensation     Patient Active Problem List   Diagnosis    Dyspareunia due to medical condition in female patient    Chronic constipation    Varicella    Migraine    Chronic fatigue    Palpitations    PONV (postoperative nausea and vomiting)    History of endometriosis    Meniere's disease    History of pulmonary embolus (PE)    Pulmonary embolus (HCC)    Estrogen deficiency    Arrhythmia    Hypertension    Estrogen deficient vulvovaginitis    Precordial pain    IC (interstitial cystitis)    SVT (supraventricular tachycardia) ([facility])    Paresthesia and pain of right extremity    Shortness of breath    Anemia    Kidney stones    Carpal tunnel syndrome, bilateral    IBS (irritable bowel syndrome)    Scoliosis of lumbosacral region due to degenerative disease of spine in adult    Anxiety    Edema    H/O hysterectomy for benign disease    Periumbilical abdominal pain    Primary stabbing headache    Right shoulder pain    Umbilical hernia without obstruction and without gangrene    Hypokalemia     Past Medical History:   Diagnosis Date    Acute dysfunction of both eustachian tubes 03/2022    Anemia     denies hx of blood transfusions    Bladder pain     Chronic lower back pain     Chronic pain     back pain-- prn meds    History of kidney stones     with pregnancy- naturally pass    Hypertension     manged with meds    IBS (irritable bowel syndrome)     Interstitial cystitis     Meniere's disease     Migraines     pt denies    Palpitations     Paresthesia and pain of right extremity 6/12/2018    PONV (postoperative nausea and vomiting)      does well with IV antiemetic    Pulmonary embolus ([address] 2015    3 days  s/p  C/S     SVT (supraventricular tachycardia) ([address]     2016 & 2017 - Now see's Dr Shannon Avalos @ [facility]    Varicella      Past Surgical History:   Procedure Laterality Date    ABLATION OF DYSRHYTHMIC FOCUS  02/15/2016    ABLATION OF DYSRHYTHMIC FOCUS  2017     SECTION      X3    COLONOSCOPY N/A 2017    COLONOSCOPY BMI 30 performed by Jeuss Jimenez MD at [facility] ENDOSCOPY    COLONOSCOPY N/A 6/15/2020    COLONOSCOPY/BMI 33 performed by Wan De Luna MD at [facility] ENDOSCOPY    COLONOSCOPY W/BIOPSY SINGLE/MULTIPLE  2017         HEENT Left 03/10/2022    L ear tube placement- [facility] [facility]  2009 and     umbilical hernia X2    HYSTERECTOMY (CERVIX STATUS UNKNOWN)  10/2016    unilateral salping-ooph    MYOMECTOMY      ROTATOR CUFF REPAIR Right 2014    TUBAL LIGATION  2015    UROLOGICAL SURGERY  2018    CYSTOSCOPY HYDRODISTENTION OF BLADDER, urethral dilitaion     Family History   Problem Relation Age of Onset    No Known Problems Father     Crohn's Disease Sister         pt reports Crohn's and UC (?)    Crohn's Disease Maternal Grandmother     Colon Cancer Maternal Grandfather     Ulcerative Colitis Mother         also Castleman's disease     Social History     Tobacco Use    Smoking status: Never     Passive exposure: Never    Smokeless tobacco: Never   Substance Use Topics    Alcohol use: Not Currently         Review of Systems   Constitutional:  Negative for chills, fatigue and fever. HENT:  Negative for congestion, rhinorrhea and sinus pain. Eyes:  Negative for visual disturbance. Respiratory:  Negative for cough and shortness of breath. Cardiovascular:  Negative for chest pain. Gastrointestinal:  Positive for abdominal pain. Negative for abdominal distention, anal bleeding, blood in stool, constipation, diarrhea, nausea, rectal pain and vomiting. Genitourinary:  Negative for dysuria. Musculoskeletal:  Negative for arthralgias and myalgias. Neurological:  Negative for dizziness, weakness and headaches. Psychiatric/Behavioral: Negative.          OBJECTIVE:  /86 (Site: Left Upper Arm, Position: Sitting, Cuff Size: Large Adult)   Ht 5' 6\" (1.676 m)   Wt 210 lb (95.3 kg)   BMI 33.89 kg/m² Physical Exam  Vitals and nursing note reviewed. Constitutional:       Appearance: Normal appearance. HENT:      Head: Normocephalic and atraumatic. Right Ear: Tympanic membrane normal.      Left Ear: Tympanic membrane normal.      Nose: Nose normal.      Mouth/Throat:      Mouth: Mucous membranes are moist.   Eyes:      Conjunctiva/sclera: Conjunctivae normal.      Pupils: Pupils are equal, round, and reactive to light. Cardiovascular:      Rate and Rhythm: Normal rate and regular rhythm. Pulses: Normal pulses. Heart sounds: Normal heart sounds. Pulmonary:      Effort: Pulmonary effort is normal.      Breath sounds: Normal breath sounds. Abdominal:      General: Abdomen is flat. A surgical scar is present. Bowel sounds are normal.      Palpations: Abdomen is soft. Tenderness: There is no abdominal tenderness. There is no right CVA tenderness, left CVA tenderness, guarding or rebound. Negative signs include Keys's sign, Rovsing's sign, McBurney's sign, psoas sign and obturator sign. Hernia: No hernia is present. Comments: Periumbilical pain   Musculoskeletal:         General: Normal range of motion. Cervical back: Normal range of motion and neck supple. Skin:     General: Skin is warm and dry. Neurological:      General: No focal deficit present. Mental Status: She is alert and oriented to person, place, and time. Psychiatric:         Behavior: Behavior normal.        Medical problems and test results were reviewed with the patient today. No results found for this or any previous visit (from the past 672 hour(s)).     ASSESSMENT and PLAN    Visit Diagnoses and Associated Orders       Umbilical hernia without obstruction and without gangrene    -  Primary    REHABILITATION [facility] - Brittny Acosta DO, General Surgery, [facility] [[id number]]           Pulmonary embolism, other, unspecified chronicity, unspecified whether acute cor pulmonale present ([facility] SVT (supraventricular tachycardia) (HCC)             Strain of abdominal wall, initial encounter        naproxen (NAPROSYN) 500 MG tablet [5393]                       Diagnosis Orders   1. Umbilical hernia without obstruction and without gangrene  [address], [address], General Surgery, [place]      2. Pulmonary embolism, other, unspecified chronicity, unspecified whether acute cor pulmonale present ([address]        3. SVT (supraventricular tachycardia) (HCC)        4. Strain of abdominal wall, initial encounter  naproxen (NAPROSYN) 500 MG tablet      , Sri was seen today for hearing problem. Diagnoses and all orders for this visit:    Umbilical hernia without obstruction and without gangrene  -     [facility], General Surgery, [place]    Pulmonary embolism, other, unspecified chronicity, unspecified whether acute cor pulmonale present (HCC)    SVT (supraventricular tachycardia) ([address]    Strain of abdominal wall, initial encounter  -     naproxen (NAPROSYN) 500 MG tablet;  Take 1 tablet by mouth 2 times daily as needed for Pain    , We will start her anti-inflammatory stretching exercises for abdominal wall were discussed patient wants to see another surgeon for third opinion so we will set that up in the meantime we will set her up with a plastic surgery for her cosmetic excessive skin

## 2023-02-01 NOTE — PROGRESS NOTES
Kirstie Bowman MD, [facility], [address] [place] [address]  [place]  Phone [phone]   Fax [phone]      Date of visit: 2/2/2023     Primary/Requesting provider: Osvaldo Pineda DO    Chief Complaint   Patient presents with    New Patient     Umbilical hernia          Patient is a 44 y.o. female who presents for evaluation of a possible recurrent umbilical hernia; she is known from colonoscopy 2017. She reports 2 prior hernia repairs, with the last being >7-8 years ago. She has noted right sided periumbilical pain for about 3 weeks, with a visible and tender bulge at the site. She experienced nausea during the first week of pain. For the last 2 weeks she has had some constipation, which is not typical with her IBS. The mass does not completely reduce but becomes less apparent with standing. She is uncertain if mesh was used for either of her prior repairs. Medications:   Current Outpatient Medications on File Prior to Visit   Medication Sig Dispense Refill    naproxen (NAPROSYN) 500 MG tablet Take 1 tablet by mouth 2 times daily as needed for Pain 60 tablet 0    dilTIAZem (CARDIZEM CD) 300 MG extended release capsule Take 1 capsule by mouth daily 90 capsule 3    spironolactone (ALDACTONE) 50 MG tablet Take 1 tablet by mouth daily 30 tablet 2    EPINEPHrine (EPIPEN) 0.3 MG/0.3ML SOAJ injection Inject 0.3 mg into the muscle once as needed      hydroCHLOROthiazide (HYDRODIURIL) 25 MG tablet Take 25 mg by mouth daily      HYDROcodone-acetaminophen (NORCO)  MG per tablet 1 TABLET BY MOUTH ORALLY EVERY 8 HOURS 30 DAYS      linaclotide (LINZESS) 290 MCG CAPS capsule Take 290 mcg by mouth every morning (before breakfast)      potassium chloride (KLOR-CON M) 20 MEQ extended release tablet Take 1 tablet by mouth daily for 2 days 2 tablet 0     No current facility-administered medications on file prior to visit. Allergies:    Allergies   Allergen Reactions    Shellfish Allergy Anaphylaxis and Hives    Shrimp Extract Allergy Skin Test Hives     facial hives and throat closing sensation        Past History:  Past Medical History:   Diagnosis Date    Acute dysfunction of both eustachian tubes 2022    Anemia     denies hx of blood transfusions    Bladder pain     Chronic lower back pain     Chronic pain     back pain-- prn meds    History of kidney stones     with pregnancy- naturally pass    Hypertension     manged with meds    IBS (irritable bowel syndrome)     Interstitial cystitis     Meniere's disease     Migraines     pt denies    Palpitations     Paresthesia and pain of right extremity 2018    PONV (postoperative nausea and vomiting)      does well with IV antiemetic    Pulmonary embolus ([address]     3 days  s/p  C/S     SVT (supraventricular tachycardia) ([address]      & 2017 - Now see's Dr Lane Sadler @ [facility]    Varicella       Past Surgical History:   Procedure Laterality Date    ABLATION OF DYSRHYTHMIC FOCUS  02/15/2016    ABLATION OF DYSRHYTHMIC FOCUS  2017     SECTION      X3    COLONOSCOPY N/A 2017    COLONOSCOPY BMI 30 performed by Reyna Vazquez MD at [facility] ENDOSCOPY    COLONOSCOPY N/A 6/15/2020    COLONOSCOPY/BMI 33 performed by Kate Lockhart MD at [facility] ENDOSCOPY    COLONOSCOPY W/BIOPSY SINGLE/MULTIPLE  2017         HEENT Left 03/10/2022    L ear tube placement- [facility] [facility]   and     umbilical hernia X2    HYSTERECTOMY (CERVIX STATUS UNKNOWN)  10/2016    unilateral salping-ooph    MYOMECTOMY      ROTATOR CUFF REPAIR Right 2014    TUBAL LIGATION  2015    UROLOGICAL SURGERY  2018    CYSTOSCOPY HYDRODISTENTION OF BLADDER, urethral dilitaion        Family and Social History:  Family History   Problem Relation Age of Onset    No Known Problems Father     Crohn's Disease Sister         pt reports Crohn's and UC (?)    Crohn's Disease Maternal Grandmother     Colon Cancer Maternal Grandfather     Ulcerative Colitis Mother         also Castleman's disease     Social History     Socioeconomic History    Marital status:      Spouse name: Not on file    Number of children: Not on file    Years of education: Not on file    Highest education level: Not on file   Occupational History    Not on file   Tobacco Use    Smoking status: Never     Passive exposure: Never    Smokeless tobacco: Never   Vaping Use    Vaping Use: Never used   Substance and Sexual Activity    Alcohol use: Not Currently    Drug use: No    Sexual activity: Not on file   Other Topics Concern    Not on file   Social History Narrative    Not on file     Social Determinants of Health     Financial Resource Strain: Not on file   Food Insecurity: Not on file   Transportation Needs: Not on file   Physical Activity: Not on file   Stress: Not on file   Social Connections: Not on file   Intimate Partner Violence: Not on file   Housing Stability: Not on file           Review of Systems   Constitutional: Negative. HENT: Negative. Eyes: Negative. Respiratory: Negative. Cardiovascular: Negative. Gastrointestinal: Negative. Endocrine: Negative. Genitourinary: Negative. Musculoskeletal: Negative. Skin: Negative. Allergic/Immunologic: Negative. Neurological: Negative. Hematological: Negative. Psychiatric/Behavioral: Negative. Physical Exam  BP (!) 156/99   Pulse (!) 101   Ht 5' 6\" (1.676 m)   Wt 211 lb (95.7 kg)   BMI 34.06 kg/m²   General Appearance: In no acute distress   Ears/Nose/Mouth/Throat:   Hearing grossly normal.         Neck: Supple. Chest:   Lungs clear to auscultation bilaterally. Cardiovascular:  Regular rate and rhythm, S1, S2 normal, no murmur. Abdomen:   Soft. Laparoscopic, low transverse scars c/w surgical history  Tenderness on right side of umbilicus.   Associated fullness within muscle/fascia, but no distinct fascial defect or isolated mass appreciated   Extremities: No gross deformity Neuro: alert and oriented x 3              DATA:  chart review (care everywhere) indicates pt had repair of supraumbilical midline hernia in 2009 (S) and then had primary repair of an umbilical hernia in 33/2067 at the time of her laparoscopic hysterectomy. ASSESSMENT and PLAN:    1. Umbilical pain    2. History of umbilical hernia repair       She does not have definite clinical evidence of a hernia recurrence, although picture is suggestive of small defect with incarcerated preperitoneal tissue. Recommend imaging to confirm defect prior to contemplating surgical intervention. Orders Placed This Encounter    CT ABDOMEN WO CONTRAST Additional Contrast? None     Standing Status:   Future     Standing Expiration Date:   2/2/2024     Order Specific Question:   Additional Contrast?     Answer:   None        Follow-up and Dispositions    Return for office will call test/scan results. Follow-up and Dispositions    Return for office will call test/scan results.

## 2023-02-02 ENCOUNTER — OFFICE VISIT (OUTPATIENT)
Dept: SURGERY | Age: 38
End: 2023-02-02
Payer: MEDICARE

## 2023-02-02 VITALS
HEART RATE: 101 BPM | WEIGHT: 211 LBS | BODY MASS INDEX: 33.91 KG/M2 | DIASTOLIC BLOOD PRESSURE: 99 MMHG | SYSTOLIC BLOOD PRESSURE: 156 MMHG | HEIGHT: 66 IN

## 2023-02-02 DIAGNOSIS — Z98.890 HISTORY OF UMBILICAL HERNIA REPAIR: ICD-10-CM

## 2023-02-02 DIAGNOSIS — Z87.19 HISTORY OF UMBILICAL HERNIA REPAIR: ICD-10-CM

## 2023-02-02 DIAGNOSIS — R10.33 UMBILICAL PAIN: Primary | ICD-10-CM

## 2023-02-02 PROCEDURE — G8427 DOCREV CUR MEDS BY ELIG CLIN: HCPCS | Performed by: SURGERY

## 2023-02-02 PROCEDURE — 3077F SYST BP >= 140 MM HG: CPT | Performed by: SURGERY

## 2023-02-02 PROCEDURE — 1036F TOBACCO NON-USER: CPT | Performed by: SURGERY

## 2023-02-02 PROCEDURE — 3080F DIAST BP >= 90 MM HG: CPT | Performed by: SURGERY

## 2023-02-02 PROCEDURE — G8417 CALC BMI ABV UP PARAM F/U: HCPCS | Performed by: SURGERY

## 2023-02-02 PROCEDURE — 99203 OFFICE O/P NEW LOW 30 MIN: CPT | Performed by: SURGERY

## 2023-02-02 PROCEDURE — G8484 FLU IMMUNIZE NO ADMIN: HCPCS | Performed by: SURGERY

## 2023-02-02 ASSESSMENT — ENCOUNTER SYMPTOMS
RESPIRATORY NEGATIVE: 1
EYES NEGATIVE: 1
ALLERGIC/IMMUNOLOGIC NEGATIVE: 1
GASTROINTESTINAL NEGATIVE: 1

## 2023-02-07 ENCOUNTER — NURSE ONLY (OUTPATIENT)
Dept: FAMILY MEDICINE CLINIC | Facility: CLINIC | Age: 38
End: 2023-02-07
Payer: MEDICARE

## 2023-02-07 DIAGNOSIS — I10 PRIMARY HYPERTENSION: ICD-10-CM

## 2023-02-07 DIAGNOSIS — Z11.59 NEED FOR HEPATITIS C SCREENING TEST: ICD-10-CM

## 2023-02-07 DIAGNOSIS — E55.9 VITAMIN D DEFICIENCY, UNSPECIFIED: ICD-10-CM

## 2023-02-07 DIAGNOSIS — I10 UNCONTROLLED HYPERTENSION: ICD-10-CM

## 2023-02-07 DIAGNOSIS — Z00.00 LABORATORY EXAMINATION ORDERED AS PART OF A ROUTINE GENERAL MEDICAL EXAMINATION: ICD-10-CM

## 2023-02-07 DIAGNOSIS — Z79.899 ENCOUNTER FOR LONG-TERM (CURRENT) USE OF HIGH-RISK MEDICATION: Primary | ICD-10-CM

## 2023-02-07 LAB
BILIRUBIN, URINE, POC: NEGATIVE
BLOOD URINE, POC: ABNORMAL
GLUCOSE URINE, POC: NEGATIVE
GRANS ABSOLUTE, POC: 2.8 K/UL
GRANULOCYTES %, POC: 38.8 %
HEMATOCRIT, POC: 43.7 %
HEMOGLOBIN, POC: 14.1 G/DL
KETONES, URINE, POC: NEGATIVE
LEUKOCYTE ESTERASE, URINE, POC: NEGATIVE
LYMPHOCYTE %, POC: 51 %
LYMPHS ABSOLUTE, POC: 3.7 K/UL
MCH, POC: 29.4 PG (ref 40–?)
MCHC, POC: 32.3
MCV, POC: 91.1
MONOCYTE %, POC: 10.2 %
MONOCYTE, ABSOLUTE POC: 0.7 K/UL
MPV, POC: 7.5 FL
NITRITE, URINE, POC: NEGATIVE
PH, URINE, POC: 6 (ref 4.6–8)
PLATELET COUNT, POC: 317 K/UL
PROTEIN,URINE, POC: NEGATIVE
RBC, POC: 4.8 M/UL
RDW, POC: 12.1 %
SPECIFIC GRAVITY, URINE, POC: 1.02 (ref 1–1.03)
URINALYSIS CLARITY, POC: ABNORMAL
URINALYSIS COLOR, POC: YELLOW
UROBILINOGEN, POC: NORMAL
WBC, POC: 7.2 K/UL

## 2023-02-07 PROCEDURE — 85025 COMPLETE CBC W/AUTO DIFF WBC: CPT | Performed by: FAMILY MEDICINE

## 2023-02-07 PROCEDURE — 81002 URINALYSIS NONAUTO W/O SCOPE: CPT | Performed by: FAMILY MEDICINE

## 2023-02-08 LAB
25(OH)D3 SERPL-MCNC: 10.8 NG/ML (ref 30–100)
ALBUMIN SERPL-MCNC: 4.1 G/DL (ref 3.5–5)
ALBUMIN/GLOB SERPL: 1.1 (ref 0.4–1.6)
ALP SERPL-CCNC: 101 U/L (ref 50–136)
ALT SERPL-CCNC: 19 U/L (ref 12–65)
ANION GAP SERPL CALC-SCNC: 8 MMOL/L (ref 2–11)
AST SERPL-CCNC: 8 U/L (ref 15–37)
BILIRUB SERPL-MCNC: 0.3 MG/DL (ref 0.2–1.1)
BUN SERPL-MCNC: 13 MG/DL (ref 6–23)
CALCIUM SERPL-MCNC: 9.2 MG/DL (ref 8.3–10.4)
CHLORIDE SERPL-SCNC: 107 MMOL/L (ref 101–110)
CHOLEST SERPL-MCNC: 170 MG/DL
CO2 SERPL-SCNC: 26 MMOL/L (ref 21–32)
CREAT SERPL-MCNC: 1 MG/DL (ref 0.6–1)
GLOBULIN SER CALC-MCNC: 3.9 G/DL (ref 2.8–4.5)
GLUCOSE SERPL-MCNC: 76 MG/DL (ref 65–100)
HCV AB SER QL: NONREACTIVE
HDLC SERPL-MCNC: 43 MG/DL (ref 40–60)
HDLC SERPL: 4
HIV 1+2 AB+HIV1 P24 AG SERPL QL IA: NONREACTIVE
HIV 1/2 RESULT COMMENT: NORMAL
LDLC SERPL CALC-MCNC: 106.2 MG/DL
POTASSIUM SERPL-SCNC: 3.6 MMOL/L (ref 3.5–5.1)
PROT SERPL-MCNC: 8 G/DL (ref 6.3–8.2)
SODIUM SERPL-SCNC: 141 MMOL/L (ref 133–143)
TRIGL SERPL-MCNC: 104 MG/DL (ref 35–150)
TSH, 3RD GENERATION: 1.83 UIU/ML (ref 0.36–3.74)
VLDLC SERPL CALC-MCNC: 20.8 MG/DL (ref 6–23)

## 2023-02-15 ENCOUNTER — TELEPHONE (OUTPATIENT)
Dept: SURGERY | Age: 38
End: 2023-02-15

## 2023-02-15 ENCOUNTER — HOSPITAL ENCOUNTER (OUTPATIENT)
Dept: CT IMAGING | Age: 38
Discharge: HOME | End: 2023-02-18
Payer: COMMERCIAL

## 2023-02-15 DIAGNOSIS — Z98.890 HISTORY OF UMBILICAL HERNIA REPAIR: ICD-10-CM

## 2023-02-15 DIAGNOSIS — R10.33 UMBILICAL PAIN: ICD-10-CM

## 2023-02-15 DIAGNOSIS — Z87.19 HISTORY OF UMBILICAL HERNIA REPAIR: ICD-10-CM

## 2023-02-15 PROCEDURE — 6360000004 HC RX CONTRAST MEDICATION: Performed by: SURGERY

## 2023-02-15 PROCEDURE — 2580000003 HC RX 258: Performed by: SURGERY

## 2023-02-15 PROCEDURE — 74177 CT ABD & PELVIS W/CONTRAST: CPT

## 2023-02-15 RX ORDER — SODIUM CHLORIDE 0.9 % (FLUSH) 0.9 %
10 SYRINGE (ML) INJECTION
Status: COMPLETED | OUTPATIENT
Start: 2023-02-15 | End: 2023-02-15

## 2023-02-15 RX ORDER — 0.9 % SODIUM CHLORIDE 0.9 %
100 INTRAVENOUS SOLUTION INTRAVENOUS ONCE
Status: COMPLETED | OUTPATIENT
Start: 2023-02-15 | End: 2023-02-15

## 2023-02-15 RX ADMIN — IOHEXOL 100 ML: 350 INJECTION, SOLUTION INTRAVENOUS at 09:40

## 2023-02-15 RX ADMIN — SODIUM CHLORIDE 100 ML: 9 INJECTION, SOLUTION INTRAVENOUS at 09:40

## 2023-02-15 RX ADMIN — SODIUM CHLORIDE, PRESERVATIVE FREE 10 ML: 5 INJECTION INTRAVENOUS at 09:40

## 2023-02-15 RX ADMIN — DIATRIZOATE MEGLUMINE AND DIATRIZOATE SODIUM 15 ML: 660; 100 LIQUID ORAL; RECTAL at 09:41

## 2023-02-15 NOTE — TELEPHONE ENCOUNTER
----- Message from Jesus Jimenez MD sent at 2/15/2023  2:39 PM EST -----  Please call- CT does not show any definite hernia or any other problem near her umbilicus. It looks the same to me as it did on a ct she had in 2021. I don't know how we can help her; if she wants to consider mesh removal and redo of the hernia repair, to see if that helps Jamil Alex is iffy at best] she can return to discuss it.

## 2023-02-23 ENCOUNTER — OFFICE VISIT (OUTPATIENT)
Dept: SURGERY | Age: 38
End: 2023-02-23
Payer: MEDICARE

## 2023-02-23 VITALS
HEART RATE: 89 BPM | BODY MASS INDEX: 34.14 KG/M2 | SYSTOLIC BLOOD PRESSURE: 153 MMHG | WEIGHT: 211.5 LBS | DIASTOLIC BLOOD PRESSURE: 101 MMHG

## 2023-02-23 DIAGNOSIS — Z98.890 HISTORY OF UMBILICAL HERNIA REPAIR: ICD-10-CM

## 2023-02-23 DIAGNOSIS — R10.33 UMBILICAL PAIN: Primary | ICD-10-CM

## 2023-02-23 DIAGNOSIS — Z87.19 HISTORY OF UMBILICAL HERNIA REPAIR: ICD-10-CM

## 2023-02-23 PROCEDURE — G8417 CALC BMI ABV UP PARAM F/U: HCPCS | Performed by: SURGERY

## 2023-02-23 PROCEDURE — G8484 FLU IMMUNIZE NO ADMIN: HCPCS | Performed by: SURGERY

## 2023-02-23 PROCEDURE — G8427 DOCREV CUR MEDS BY ELIG CLIN: HCPCS | Performed by: SURGERY

## 2023-02-23 PROCEDURE — 1036F TOBACCO NON-USER: CPT | Performed by: SURGERY

## 2023-02-23 PROCEDURE — 3077F SYST BP >= 140 MM HG: CPT | Performed by: SURGERY

## 2023-02-23 PROCEDURE — 3080F DIAST BP >= 90 MM HG: CPT | Performed by: SURGERY

## 2023-02-23 PROCEDURE — 99212 OFFICE O/P EST SF 10 MIN: CPT | Performed by: SURGERY

## 2023-02-23 NOTE — PROGRESS NOTES
Carter Jackson MD, [facility], [address] [place] [address]  [place]  Phone [phone]   Fax [phone]      Date of visit: 2/23/2023     Primary/Requesting provider: Coleman Castellanos DO    Chief Complaint   Patient presents with    Follow-up     FU - Umbilical hernia - CT results         Returns to discuss imaging results  No change in umbilical pain or bulging    Review of Systems    Physical Exam      CT Result (most recent):  CT ABDOMEN PELVIS W IV CONTRAST 02/15/2023    Narrative  HISTORY:  Periumbilical pain; Other specified postprocedural states; Personal  history of other diseases of the digestive system. History of hernia repair x2. History of hysterectomy. COMPARISON: 7/20/2021    EXAM: CT abdomen and pelvis with iv contrast    TECHNIQUE:  Thin section axial CT was performed from the lung bases through the symphysis  pubis during uneventful rapid bolus intravenous administration of 100 mL of  Isovue 370. Oral contrast was also administered. Radiation dose reduction  techniques were used for this study. Our CT scanners use one or all of the  following: Automated exposure control, adjustment of the mA and/or kV according  to patient size, use of iterative reconstruction. FINDINGS:    The lung bases are clear. CT abdomen: There is a small hypodense lesion in the right posterior lobe of  liver measuring 10 mm (image 21). The spleen enhances homogeneously without  discrete lesions. There is no biliary ductal dilatation. The gallbladder,  pancreas and adrenal glands are normal. The kidneys enhance symmetrically. Bowel  loops in the upper abdomen are normal. No definite upper abdominal  lymphadenopathy seen. CT pelvis: The appendix is normal. The patient is status post hysterectomy. The  bladder and rectum are normal. No pelvic adenopathy seen. No free air or free  fluid seen within the pelvis.     Bone window evaluation demonstrates no aggressive osseous lesions. Impression  1. Small hypodense lesion in the right posterior lobe of the liver is too small  to further characterize on this exam.  2. Status post hysterectomy. ASSESSMENT and PLAN:    1. Umbilical pain    2. History of umbilical hernia repair       10min discussion about CT- no surgically correctable anatomic abnormality noted thus no intervention is indicated. Since there is no documentation of prior mesh insertion, and CT does not show obvious implant, exploration to remove mesh is not indicated. Follow-up and Dispositions    Return for as needed.

## 2023-03-07 ENCOUNTER — OFFICE VISIT (OUTPATIENT)
Dept: FAMILY MEDICINE CLINIC | Facility: CLINIC | Age: 38
End: 2023-03-07

## 2023-03-07 VITALS
SYSTOLIC BLOOD PRESSURE: 132 MMHG | WEIGHT: 210 LBS | HEIGHT: 66 IN | DIASTOLIC BLOOD PRESSURE: 84 MMHG | BODY MASS INDEX: 33.75 KG/M2

## 2023-03-07 DIAGNOSIS — I10 PRIMARY HYPERTENSION: ICD-10-CM

## 2023-03-07 DIAGNOSIS — F51.05 INSOMNIA DUE TO OTHER MENTAL DISORDER: ICD-10-CM

## 2023-03-07 DIAGNOSIS — Z00.00 ROUTINE GENERAL MEDICAL EXAMINATION AT A HEALTH CARE FACILITY: Primary | ICD-10-CM

## 2023-03-07 DIAGNOSIS — Z80.3 FAMILY HISTORY OF BREAST CANCER: ICD-10-CM

## 2023-03-07 DIAGNOSIS — J34.89 SINUS PRESSURE: ICD-10-CM

## 2023-03-07 DIAGNOSIS — Z13.31 SCREENING FOR DEPRESSION: ICD-10-CM

## 2023-03-07 DIAGNOSIS — J32.9 RECURRENT SINUSITIS: ICD-10-CM

## 2023-03-07 DIAGNOSIS — F99 INSOMNIA DUE TO OTHER MENTAL DISORDER: ICD-10-CM

## 2023-03-07 DIAGNOSIS — E55.9 VITAMIN D DEFICIENCY, UNSPECIFIED: ICD-10-CM

## 2023-03-07 RX ORDER — DOXYCYCLINE 100 MG/1
100 TABLET ORAL 2 TIMES DAILY
Qty: 14 TABLET | Refills: 0 | Status: SHIPPED | OUTPATIENT
Start: 2023-03-07 | End: 2023-03-14

## 2023-03-07 RX ORDER — FLUTICASONE PROPIONATE 50 MCG
2 SPRAY, SUSPENSION (ML) NASAL DAILY
Qty: 1 EACH | Refills: 11 | Status: SHIPPED | OUTPATIENT
Start: 2023-03-07

## 2023-03-07 RX ORDER — GUAIFENESIN AND DEXTROMETHORPHAN HYDROBROMIDE 1200; 60 MG/1; MG/1
1 TABLET, EXTENDED RELEASE ORAL EVERY 12 HOURS
Qty: 20 TABLET | Refills: 0 | Status: SHIPPED | OUTPATIENT
Start: 2023-03-07 | End: 2023-03-17

## 2023-03-07 RX ORDER — FLUCONAZOLE 100 MG/1
100 TABLET ORAL DAILY
Qty: 3 TABLET | Refills: 0 | Status: SHIPPED | OUTPATIENT
Start: 2023-03-07 | End: 2023-03-10

## 2023-03-07 RX ORDER — ZOLPIDEM TARTRATE 5 MG/1
5 TABLET ORAL NIGHTLY PRN
Qty: 30 TABLET | Refills: 0 | Status: SHIPPED | OUTPATIENT
Start: 2023-03-07 | End: 2023-03-21

## 2023-03-07 ASSESSMENT — PATIENT HEALTH QUESTIONNAIRE - PHQ9
SUM OF ALL RESPONSES TO PHQ QUESTIONS 1-9: 0
SUM OF ALL RESPONSES TO PHQ9 QUESTIONS 1 & 2: 0
2. FEELING DOWN, DEPRESSED OR HOPELESS: 0
1. LITTLE INTEREST OR PLEASURE IN DOING THINGS: 0

## 2023-03-07 ASSESSMENT — ENCOUNTER SYMPTOMS
SHORTNESS OF BREATH: 0
COUGH: 0
ABDOMINAL PAIN: 0

## 2023-03-07 NOTE — PROGRESS NOTES
PROGRESS NOTE    SUBJECTIVE:   Shantel Sheppard is a 44 y.o. female seen for a follow up visit regarding the following chief complaint:     Chief Complaint   Patient presents with    Annual Exam    Discuss Labs           HPI  Patient presents office today for complete physical without complaints patient recently found out that his mother and in was diagnosed with breast cancer and was advised by their oncologist to get checked as soon as possible patient's been trying to get in touch with her OB/GYN and has not been able to communicate with them through [address] phone calls etc.    Past Medical History, Past Surgical History, Family history, Social History, and Medications were all reviewed with the patient today and updated as necessary. Current Outpatient Medications   Medication Sig Dispense Refill    doxycycline monohydrate (ADOXA) 100 MG tablet Take 1 tablet by mouth 2 times daily for 7 days 14 tablet 0    Dextromethorphan-guaiFENesin (MUCINEX DM MAXIMUM STRENGTH)  MG TB12 Take 1 tablet by mouth every 12 hours for 10 days 20 tablet 0    fluticasone (FLONASE) 50 MCG/ACT nasal spray 2 sprays by Each Nostril route daily 1 each 11    zolpidem (AMBIEN) 5 MG tablet Take 1 tablet by mouth nightly as needed for Sleep for up to 14 days.  Max Daily Amount: 5 mg 30 tablet 0    dilTIAZem (CARDIZEM CD) 300 MG extended release capsule Take 1 capsule by mouth daily 90 capsule 3    potassium chloride (KLOR-CON M) 20 MEQ extended release tablet Take 1 tablet by mouth daily for 2 days 2 tablet 0    spironolactone (ALDACTONE) 50 MG tablet Take 1 tablet by mouth daily 30 tablet 2    EPINEPHrine (EPIPEN) 0.3 MG/0.3ML SOAJ injection Inject 0.3 mg into the muscle once as needed      hydroCHLOROthiazide (HYDRODIURIL) 25 MG tablet Take 25 mg by mouth daily      HYDROcodone-acetaminophen (NORCO)  MG per tablet 1 TABLET BY MOUTH ORALLY EVERY 8 HOURS 30 DAYS      linaclotide (LINZESS) 290 MCG CAPS capsule Take 290 mcg by mouth every morning (before breakfast)       No current facility-administered medications for this visit.      Allergies   Allergen Reactions    Shellfish Allergy Anaphylaxis and Hives    Shrimp Extract Allergy Skin Test Hives     facial hives and throat closing sensation     Patient Active Problem List   Diagnosis    Dyspareunia due to medical condition in female patient    Chronic constipation    Varicella    Migraine    Chronic fatigue    Palpitations    PONV (postoperative nausea and vomiting)    History of endometriosis    Meniere's disease    History of pulmonary embolus (PE)    Pulmonary embolus (HCC)    Estrogen deficiency    Arrhythmia    Hypertension    Estrogen deficient vulvovaginitis    Precordial pain    IC (interstitial cystitis)    SVT (supraventricular tachycardia) ([facility])    Paresthesia and pain of right extremity    Shortness of breath    Anemia    Kidney stones    Carpal tunnel syndrome, bilateral    IBS (irritable bowel syndrome)    Scoliosis of lumbosacral region due to degenerative disease of spine in adult    Anxiety    Edema    H/O hysterectomy for benign disease    Periumbilical abdominal pain    Primary stabbing headache    Right shoulder pain    Umbilical hernia without obstruction and without gangrene    Hypokalemia     Past Medical History:   Diagnosis Date    Acute dysfunction of both eustachian tubes 03/2022    Anemia     denies hx of blood transfusions    Bladder pain     Chronic lower back pain     Chronic pain     back pain-- prn meds    History of kidney stones     with pregnancy- naturally pass    Hypertension     manged with meds    IBS (irritable bowel syndrome)     Interstitial cystitis     Meniere's disease     Migraines     pt denies    Palpitations     Paresthesia and pain of right extremity 6/12/2018    PONV (postoperative nausea and vomiting)      does well with IV antiemetic    Pulmonary embolus ([address] 2015    3 days  s/p  C/S     SVT (supraventricular tachycardia) ([address] 2016 & 2017 - Now see's Dr Jovana Chavarria @ [facility]    Varicella      Past Surgical History:   Procedure Laterality Date    ABLATION OF DYSRHYTHMIC FOCUS  02/15/2016    ABLATION OF DYSRHYTHMIC FOCUS  2017     SECTION      X3    COLONOSCOPY N/A 2017    COLONOSCOPY BMI 30 performed by Becky Cottrell MD at [facility] ENDOSCOPY    COLONOSCOPY N/A 6/15/2020    COLONOSCOPY/BMI 33 performed by Opal Connor MD at [facility] ENDOSCOPY    COLONOSCOPY W/BIOPSY SINGLE/MULTIPLE  2017         HEENT Left 03/10/2022    L ear tube placement- [facility] [facility]  2009 and     umbilical hernia X2    HYSTERECTOMY (CERVIX STATUS UNKNOWN)  10/2016    unilateral salping-ooph    MYOMECTOMY      ROTATOR CUFF REPAIR Right 2014    TUBAL LIGATION  2015    UROLOGICAL SURGERY  2018    CYSTOSCOPY HYDRODISTENTION OF BLADDER, urethral dilitaion     Family History   Problem Relation Age of Onset    No Known Problems Father     Crohn's Disease Sister         pt reports Crohn's and UC (?)    Crohn's Disease Maternal Grandmother     Colon Cancer Maternal Grandfather     Ulcerative Colitis Mother         also Castleman's disease     Social History     Tobacco Use    Smoking status: Never     Passive exposure: Never    Smokeless tobacco: Never   Substance Use Topics    Alcohol use: Not Currently         Review of Systems   Constitutional:  Negative for chills and fever. Respiratory:  Negative for cough and shortness of breath. Cardiovascular:  Negative for chest pain. Gastrointestinal:  Negative for abdominal pain. Endocrine: Negative for cold intolerance and heat intolerance. Genitourinary:  Negative for difficulty urinating, frequency, hematuria, pelvic pain, vaginal bleeding, vaginal discharge and vaginal pain. Neurological:  Negative for headaches. Psychiatric/Behavioral: Negative.          OBJECTIVE:  /84 (Site: Left Upper Arm, Position: Sitting, Cuff Size: Large Adult)   Ht 5' 6\" (1.676 m)   Wt 210 lb (95.3 kg)   BMI 33.89 kg/m²      Physical Exam  Vitals and nursing note reviewed. Constitutional:       Appearance: Normal appearance. HENT:      Head: Normocephalic and atraumatic. Nose: Nose normal.      Mouth/Throat:      Mouth: Mucous membranes are moist.      Pharynx: No oropharyngeal exudate or posterior oropharyngeal erythema. Eyes:      Extraocular Movements: Extraocular movements intact. Conjunctiva/sclera: Conjunctivae normal.      Pupils: Pupils are equal, round, and reactive to light. Cardiovascular:      Rate and Rhythm: Normal rate and regular rhythm. Pulses: Normal pulses. Heart sounds: Normal heart sounds. Pulmonary:      Effort: Pulmonary effort is normal.      Breath sounds: Normal breath sounds. Abdominal:      General: Abdomen is flat. Bowel sounds are normal.      Palpations: Abdomen is soft. Genitourinary:     Comments: Deferred done by OB/GYN  Musculoskeletal:         General: Normal range of motion. Cervical back: Normal range of motion and neck supple. Skin:     General: Skin is warm. Capillary Refill: Capillary refill takes less than 2 seconds. Neurological:      General: No focal deficit present. Mental Status: She is alert and oriented to person, place, and time. Psychiatric:         Mood and Affect: Mood normal.         Behavior: Behavior normal.         Thought Content: Thought content normal.         Judgment: Judgment normal.        Medical problems and test results were reviewed with the patient today.      Recent Results (from the past 672 hour(s))   Vitamin D 25 Hydroxy    Collection Time: 02/07/23 10:03 AM   Result Value Ref Range    Vit D, 25-Hydroxy 10.8 (L) 30.0 - 100.0 ng/mL   TSH    Collection Time: 02/07/23 10:03 AM   Result Value Ref Range    TSH, 3RD GENERATION 1.830 0.358 - 3.740 uIU/mL   Comprehensive Metabolic Panel    Collection Time: 02/07/23 10:03 AM   Result Value Ref Range    Sodium 141 133 - 143 mmol/L Potassium 3.6 3.5 - 5.1 mmol/L    Chloride 107 101 - 110 mmol/L    CO2 26 21 - 32 mmol/L    Anion Gap 8 2 - 11 mmol/L    Glucose 76 65 - 100 mg/dL    BUN 13 6 - 23 MG/DL    Creatinine 1.00 0.6 - 1.0 MG/DL    Est, Glom Filt Rate >60 >60 ml/min/1.73m2    Calcium 9.2 8.3 - 10.4 MG/DL    Total Bilirubin 0.3 0.2 - 1.1 MG/DL    ALT 19 12 - 65 U/L    AST 8 (L) 15 - 37 U/L    Alk Phosphatase 101 50 - 136 U/L    Total Protein 8.0 6.3 - 8.2 g/dL    Albumin 4.1 3.5 - 5.0 g/dL    Globulin 3.9 2.8 - 4.5 g/dL    Albumin/Globulin Ratio 1.1 0.4 - 1.6     Lipid Panel    Collection Time: 02/07/23 10:03 AM   Result Value Ref Range    Cholesterol, Total 170 <200 MG/DL    Triglycerides 104 35 - 150 MG/DL    HDL 43 40 - 60 MG/DL    LDL Calculated 106.2 (H) <100 MG/DL    VLDL Cholesterol Calculated 20.8 6.0 - 23.0 MG/DL    Chol/HDL Ratio 4.0     HIV 1/2 Ag/Ab, 4TH Generation,W Rflx Confirm    Collection Time: 02/07/23 10:03 AM   Result Value Ref Range    HIV 1/2 Interp NONREACTIVE NONREACTIVE      HIV 1/2 Result Comment SEE NOTE     Hepatitis C Antibody    Collection Time: 02/07/23 10:03 AM   Result Value Ref Range    Hepatitis C Ab NONREACTIVE NONREACTIVE         ASSESSMENT and PLAN    Visit Diagnoses and Associated Orders       Routine general medical examination at a health care facility    -  Primary         Screening for depression             Insomnia due to other mental disorder        zolpidem (AMBIEN) 5 MG tablet [26111]           Vitamin D deficiency, unspecified             Primary hypertension             Sinus pressure             Recurrent sinusitis        doxycycline monohydrate (ADOXA) 100 MG tablet [47393]      Dextromethorphan-guaiFENesin (MUCINEX DM MAXIMUM STRENGTH)  MG TB12 [60084]      fluticasone (FLONASE) 50 MCG/ACT nasal spray [60039]           Family history of breast cancer        HERACLIO DIGITAL SCREEN W OR WO CAD BILATERAL [85228 Custom]   - Future Order                     Diagnosis Orders   1.  Routine general medical examination at a health care facility        2. Screening for depression        3. Insomnia due to other mental disorder  zolpidem (AMBIEN) 5 MG tablet      4. Vitamin D deficiency, unspecified        5. Primary hypertension        6. Sinus pressure        7. Recurrent sinusitis  doxycycline monohydrate (ADOXA) 100 MG tablet    Dextromethorphan-guaiFENesin (MUCINEX DM MAXIMUM STRENGTH)  MG TB12    fluticasone (FLONASE) 50 MCG/ACT nasal spray      8. Family history of breast cancer  HERACLIO DIGITAL SCREEN W OR WO CAD Gabriella Chatman was seen today for annual exam and discuss labs. Diagnoses and all orders for this visit:    Routine general medical examination at a health care facility    Screening for depression    Insomnia due to other mental disorder  -     zolpidem (AMBIEN) 5 MG tablet; Take 1 tablet by mouth nightly as needed for Sleep for up to 14 days. Max Daily Amount: 5 mg    Vitamin D deficiency, unspecified    Primary hypertension    Sinus pressure    Recurrent sinusitis  -     doxycycline monohydrate (ADOXA) 100 MG tablet; Take 1 tablet by mouth 2 times daily for 7 days  -     Dextromethorphan-guaiFENesin (MUCINEX DM MAXIMUM STRENGTH)  MG TB12; Take 1 tablet by mouth every 12 hours for 10 days  -     fluticasone (FLONASE) 50 MCG/ACT nasal spray; 2 sprays by Each Nostril route daily    Family history of breast cancer  -     HERACLIO DIGITAL SCREEN W OR WO CAD BILATERAL; Future    , Otherwise normal routine physical exam reviewed her labs answered all her questions counseling supportive care gone over. I have spent a total of 8-15 minutes assessing, reviewing, and discussing the depression screening with patient in office today.    Recommended follow-up with OB/GYN we will schedule her for a mammogram continue with pain management called in an antibiotic and medicines for her congestion/sinus pressure headache supportive care follow-up signs symptoms persist or worsen also called her in medication for sleep and to reestablish her sleep hygiene sleep cycle follow-up signs symptoms persist or worsen

## 2023-03-10 ENCOUNTER — TRANSCRIBE ORDERS (OUTPATIENT)
Dept: SCHEDULING | Age: 38
End: 2023-03-10

## 2023-03-10 DIAGNOSIS — Z12.31 VISIT FOR SCREENING MAMMOGRAM: Primary | ICD-10-CM

## 2023-04-20 ENCOUNTER — TELEPHONE (OUTPATIENT)
Dept: FAMILY MEDICINE CLINIC | Facility: CLINIC | Age: 38
End: 2023-04-20

## 2023-04-20 NOTE — TELEPHONE ENCOUNTER
Ubrelvy 100 mg   PA Case: [id number], Status: Approved, Coverage Starts on: 4/20/2023 12:00:00 AM, Coverage Ends on: 4/19/2024 12:00:00 AM.

## 2023-04-25 ENCOUNTER — HOSPITAL ENCOUNTER (EMERGENCY)
Age: 38
Discharge: HOME | End: 2023-04-25
Attending: EMERGENCY MEDICINE
Payer: MEDICARE

## 2023-04-25 VITALS
BODY MASS INDEX: 31.66 KG/M2 | HEIGHT: 66 IN | TEMPERATURE: 98 F | WEIGHT: 197 LBS | SYSTOLIC BLOOD PRESSURE: 148 MMHG | OXYGEN SATURATION: 99 % | RESPIRATION RATE: 18 BRPM | DIASTOLIC BLOOD PRESSURE: 96 MMHG | HEART RATE: 81 BPM

## 2023-04-25 DIAGNOSIS — H92.03 OTALGIA OF BOTH EARS: Primary | ICD-10-CM

## 2023-04-25 PROCEDURE — 99283 EMERGENCY DEPT VISIT LOW MDM: CPT

## 2023-04-25 RX ORDER — AMOXICILLIN AND CLAVULANATE POTASSIUM 875; 125 MG/1; MG/1
1 TABLET, FILM COATED ORAL 2 TIMES DAILY
Qty: 20 TABLET | Refills: 0 | Status: SHIPPED | OUTPATIENT
Start: 2023-04-25 | End: 2023-05-05

## 2023-04-25 RX ORDER — FLUCONAZOLE 150 MG/1
150 TABLET ORAL ONCE
Qty: 1 TABLET | Refills: 0 | Status: SHIPPED | OUTPATIENT
Start: 2023-04-25 | End: 2023-04-25

## 2023-04-25 ASSESSMENT — PAIN SCALES - GENERAL: PAINLEVEL_OUTOF10: 9

## 2023-04-25 ASSESSMENT — ENCOUNTER SYMPTOMS
CONSTIPATION: 0
BACK PAIN: 0
DIARRHEA: 0
VOMITING: 0
SORE THROAT: 0
ABDOMINAL PAIN: 0
NAUSEA: 0
COLOR CHANGE: 0
RHINORRHEA: 0
COUGH: 0
SHORTNESS OF BREATH: 0

## 2023-04-25 ASSESSMENT — PAIN - FUNCTIONAL ASSESSMENT: PAIN_FUNCTIONAL_ASSESSMENT: 0-10

## 2023-04-25 NOTE — ED TRIAGE NOTES
Pt presents c/o bilateral ear pain, started Friday, worse in left, reports ear pain is causing sore throat, denies drainage, afebrile.

## 2023-04-26 NOTE — ED NOTES
I have reviewed discharge instructions with the patient. The patient verbalized understanding. Patient left ED via Discharge Method: ambulatory to Home with SO. Opportunity for questions and clarification provided. Patient given 2 scripts. To continue your aftercare when you leave the hospital, you may receive an automated call from our care team to check in on how you are doing. This is a free service and part of our promise to provide the best care and service to meet your aftercare needs.  If you have questions, or wish to unsubscribe from this service please call [phone]. Thank you for Choosing our [facility] Emergency Department.         Corry Marcus RN  04/25/23 2012

## 2023-04-26 NOTE — ED PROVIDER NOTES
Emergency Department Provider Note       PCP: Faiza Zamorano DO   Age: 44 y.o. Sex: female     DISPOSITION Decision To Discharge 04/25/2023 08:02:39 PM       ICD-10-CM    1. Otalgia of both ears  H92.03           Medical Decision Making     Complexity of Problems Addressed:  Acute illness    Data Reviewed and Analyzed:  Category 1:   I independently ordered and reviewed each unique test.         Category 2:         Category 3: Discussion of management or test interpretation. Pt with left TM tube. Having ear pain bilaterally. No acute on exam. With pain will treat with abx and refer back to ENT. Risk of Complications and/or Morbidity of Patient Management:  Prescription drug management performed. Patient was discharged risks and benefits of hospitalization were considered. Shared medical decision making was utilized in creating the patients health plan today. Is this patient to be included in the SEP-1 core measure due to severe sepsis or septic shock? No Exclusion criteria - the patient is NOT to be included for SEP-1 Core Measure due to: Infection is not suspected      History      Sarah Harp is a 44 y.o. female who presents to the Emergency Department with chief complaint of    Chief Complaint   Patient presents with    Otalgia      Pt with bilateral ear pain left worse than right. No drainage. Has a tube in place in left ear. With symptoms getting worse came in for eval.     The history is provided by the patient. No  was used. Otalgia  This is a new problem. The current episode started more than 2 days ago. The problem occurs constantly. The problem has been gradually worsening. Associated symptoms include headaches. Pertinent negatives include no chest pain, no abdominal pain and no shortness of breath. Nothing aggravates the symptoms. Nothing relieves the symptoms. She has tried nothing for the symptoms.       Review of Systems   Constitutional:  Negative for chills

## 2023-05-04 ENCOUNTER — TELEPHONE (OUTPATIENT)
Dept: FAMILY MEDICINE CLINIC | Facility: CLINIC | Age: 38
End: 2023-05-04

## 2023-05-04 DIAGNOSIS — R21 RASH: Primary | ICD-10-CM

## 2023-05-22 ENCOUNTER — OFFICE VISIT (OUTPATIENT)
Dept: ENT CLINIC | Age: 38
End: 2023-05-22
Payer: MEDICARE

## 2023-05-22 VITALS — WEIGHT: 216 LBS | HEIGHT: 66 IN | BODY MASS INDEX: 34.72 KG/M2

## 2023-05-22 DIAGNOSIS — Z45.89 TYMPANOSTOMY TUBE CHECK: Primary | ICD-10-CM

## 2023-05-22 PROCEDURE — 99213 OFFICE O/P EST LOW 20 MIN: CPT | Performed by: OTOLARYNGOLOGY

## 2023-05-22 PROCEDURE — G8428 CUR MEDS NOT DOCUMENT: HCPCS | Performed by: OTOLARYNGOLOGY

## 2023-05-22 PROCEDURE — G8417 CALC BMI ABV UP PARAM F/U: HCPCS | Performed by: OTOLARYNGOLOGY

## 2023-05-22 PROCEDURE — 1036F TOBACCO NON-USER: CPT | Performed by: OTOLARYNGOLOGY

## 2023-05-22 RX ORDER — METHYLPREDNISOLONE 4 MG/1
TABLET ORAL
Qty: 1 KIT | Refills: 0 | Status: SHIPPED | OUTPATIENT
Start: 2023-05-22 | End: 2023-05-28

## 2023-05-22 ASSESSMENT — ENCOUNTER SYMPTOMS
SHORTNESS OF BREATH: 0
SORE THROAT: 0
ABDOMINAL PAIN: 0

## 2023-05-22 NOTE — PROGRESS NOTES
Chief Complaint   Patient presents with    Ear Fullness     Patient states that she has a head cold and her ears feel full. HPI:  Keyanna Lewis is a 44 y.o. female seen in follow-up for her ears. She is s/p L ear tube placement back on 3/10/22. Last seen on 11/21/22, and at that time the tube was still in place and working well. She had just mild drainage from that left ear over the winter and has overall been happy since placement. About 1.5 weeks ago, however, she presented with a bad head cold with nasal congestion and throat drainage. Few days later, she then developed severe otalgia and ear pressure. She was seen in the ED and started on Augmentin. She still has some cold-like symptoms, but there is no further otalgia. Her ears still feel very full today. Past Medical History, Past Surgical History, Family history, Social History, and Medications were all reviewed with the patient today and updated as necessary.      Allergies   Allergen Reactions    Shellfish Allergy Anaphylaxis and Hives    Shrimp Extract Allergy Skin Test Hives     facial hives and throat closing sensation     Patient Active Problem List   Diagnosis    Dyspareunia due to medical condition in female patient    Chronic constipation    Varicella    Migraine    Chronic fatigue    Palpitations    PONV (postoperative nausea and vomiting)    History of endometriosis    Meniere's disease    History of pulmonary embolus (PE)    Pulmonary embolus (HCC)    Estrogen deficiency    Arrhythmia    Hypertension    Estrogen deficient vulvovaginitis    Precordial pain    IC (interstitial cystitis)    SVT (supraventricular tachycardia) (HCC)    Paresthesia and pain of right extremity    Shortness of breath    Anemia    Kidney stones    Carpal tunnel syndrome, bilateral    IBS (irritable bowel syndrome)    Scoliosis of lumbosacral region due to degenerative disease of spine in adult    Anxiety    Edema    H/O hysterectomy for benign disease

## 2023-05-23 ENCOUNTER — APPOINTMENT (RX ONLY)
Dept: URBAN - METROPOLITAN AREA CLINIC 329 | Facility: CLINIC | Age: 38
Setting detail: DERMATOLOGY
End: 2023-05-23

## 2023-05-23 DIAGNOSIS — L30.4 ERYTHEMA INTERTRIGO: ICD-10-CM | Status: INADEQUATELY CONTROLLED

## 2023-05-23 PROCEDURE — ? PRESCRIPTION MEDICATION MANAGEMENT

## 2023-05-23 PROCEDURE — ? FULL BODY SKIN EXAM - DECLINED

## 2023-05-23 PROCEDURE — ? COUNSELING

## 2023-05-23 PROCEDURE — ? ADDITIONAL NOTES

## 2023-05-23 PROCEDURE — ? PRESCRIPTION

## 2023-05-23 PROCEDURE — 99203 OFFICE O/P NEW LOW 30 MIN: CPT

## 2023-05-23 RX ORDER — ALCLOMETASONE DIPROPIONATE 0.5 MG/G
OINTMENT TOPICAL
Qty: 60 | Refills: 3 | Status: ERX | COMMUNITY
Start: 2023-05-23

## 2023-05-23 RX ADMIN — ALCLOMETASONE DIPROPIONATE: 0.5 OINTMENT TOPICAL at 00:00

## 2023-05-23 ASSESSMENT — LOCATION ZONE DERM: LOCATION ZONE: TRUNK

## 2023-05-23 ASSESSMENT — LOCATION DETAILED DESCRIPTION DERM: LOCATION DETAILED: PERIUMBILICAL SKIN

## 2023-05-23 ASSESSMENT — LOCATION SIMPLE DESCRIPTION DERM: LOCATION SIMPLE: ABDOMEN

## 2023-05-23 NOTE — PROCEDURE: PRESCRIPTION MEDICATION MANAGEMENT
Render In Strict Bullet Format?: No
Initiate Treatment: Alclometasone ointment to affected area bid for 2 weeks
Detail Level: Zone

## 2023-05-23 NOTE — PROCEDURE: ADDITIONAL NOTES
Detail Level: Simple
Render Risk Assessment In Note?: no
Additional Notes: Advised patient to avoid tight fitting clothes and avoid friction in the area.

## 2023-06-21 ENCOUNTER — APPOINTMENT (OUTPATIENT)
Dept: CT IMAGING | Age: 38
End: 2023-06-21
Payer: MEDICARE

## 2023-06-21 ENCOUNTER — HOSPITAL ENCOUNTER (EMERGENCY)
Age: 38
Discharge: HOME | End: 2023-06-21
Attending: EMERGENCY MEDICINE
Payer: MEDICARE

## 2023-06-21 VITALS
TEMPERATURE: 98.6 F | OXYGEN SATURATION: 100 % | RESPIRATION RATE: 18 BRPM | WEIGHT: 205 LBS | BODY MASS INDEX: 32.95 KG/M2 | HEIGHT: 66 IN | DIASTOLIC BLOOD PRESSURE: 103 MMHG | HEART RATE: 100 BPM | SYSTOLIC BLOOD PRESSURE: 182 MMHG

## 2023-06-21 DIAGNOSIS — R10.84 GENERALIZED ABDOMINAL PAIN: Primary | ICD-10-CM

## 2023-06-21 LAB
ALBUMIN SERPL-MCNC: 3.9 G/DL (ref 3.5–5)
ALBUMIN/GLOB SERPL: 1 (ref 0.4–1.6)
ALP SERPL-CCNC: 88 U/L (ref 50–136)
ALT SERPL-CCNC: 19 U/L (ref 12–65)
ANION GAP SERPL CALC-SCNC: 5 MMOL/L (ref 2–11)
AST SERPL-CCNC: 10 U/L (ref 15–37)
BASOPHILS # BLD: 0.1 K/UL (ref 0–0.2)
BASOPHILS NFR BLD: 1 % (ref 0–2)
BILIRUB SERPL-MCNC: 0.1 MG/DL (ref 0.2–1.1)
BUN SERPL-MCNC: 9 MG/DL (ref 6–23)
CALCIUM SERPL-MCNC: 8.9 MG/DL (ref 8.3–10.4)
CHLORIDE SERPL-SCNC: 106 MMOL/L (ref 101–110)
CO2 SERPL-SCNC: 30 MMOL/L (ref 21–32)
CREAT SERPL-MCNC: 0.9 MG/DL (ref 0.6–1)
DIFFERENTIAL METHOD BLD: ABNORMAL
EOSINOPHIL # BLD: 0.1 K/UL (ref 0–0.8)
EOSINOPHIL NFR BLD: 2 % (ref 0.5–7.8)
ERYTHROCYTE [DISTWIDTH] IN BLOOD BY AUTOMATED COUNT: 12.3 % (ref 11.9–14.6)
GLOBULIN SER CALC-MCNC: 3.9 G/DL (ref 2.8–4.5)
GLUCOSE SERPL-MCNC: 93 MG/DL (ref 65–100)
HCT VFR BLD AUTO: 38.4 % (ref 35.8–46.3)
HGB BLD-MCNC: 12.9 G/DL (ref 11.7–15.4)
IMM GRANULOCYTES # BLD AUTO: 0 K/UL (ref 0–0.5)
IMM GRANULOCYTES NFR BLD AUTO: 0 % (ref 0–5)
LIPASE SERPL-CCNC: 61 U/L (ref 73–393)
LYMPHOCYTES # BLD: 3.2 K/UL (ref 0.5–4.6)
LYMPHOCYTES NFR BLD: 45 % (ref 13–44)
MCH RBC QN AUTO: 29 PG (ref 26.1–32.9)
MCHC RBC AUTO-ENTMCNC: 33.6 G/DL (ref 31.4–35)
MCV RBC AUTO: 86.3 FL (ref 82–102)
MONOCYTES # BLD: 0.7 K/UL (ref 0.1–1.3)
MONOCYTES NFR BLD: 9 % (ref 4–12)
NEUTS SEG # BLD: 3 K/UL (ref 1.7–8.2)
NEUTS SEG NFR BLD: 43 % (ref 43–78)
NRBC # BLD: 0 K/UL (ref 0–0.2)
PLATELET # BLD AUTO: 305 K/UL (ref 150–450)
PMV BLD AUTO: 9.5 FL (ref 9.4–12.3)
POTASSIUM SERPL-SCNC: 3.2 MMOL/L (ref 3.5–5.1)
PROT SERPL-MCNC: 7.8 G/DL (ref 6.3–8.2)
RBC # BLD AUTO: 4.45 M/UL (ref 4.05–5.2)
SODIUM SERPL-SCNC: 141 MMOL/L (ref 133–143)
WBC # BLD AUTO: 7 K/UL (ref 4.3–11.1)

## 2023-06-21 PROCEDURE — 85025 COMPLETE CBC W/AUTO DIFF WBC: CPT

## 2023-06-21 PROCEDURE — 6360000004 HC RX CONTRAST MEDICATION: Performed by: STUDENT IN AN ORGANIZED HEALTH CARE EDUCATION/TRAINING PROGRAM

## 2023-06-21 PROCEDURE — 83690 ASSAY OF LIPASE: CPT

## 2023-06-21 PROCEDURE — 2580000003 HC RX 258

## 2023-06-21 PROCEDURE — 74177 CT ABD & PELVIS W/CONTRAST: CPT

## 2023-06-21 PROCEDURE — 96375 TX/PRO/DX INJ NEW DRUG ADDON: CPT

## 2023-06-21 PROCEDURE — 80053 COMPREHEN METABOLIC PANEL: CPT

## 2023-06-21 PROCEDURE — 99285 EMERGENCY DEPT VISIT HI MDM: CPT

## 2023-06-21 PROCEDURE — 6360000002 HC RX W HCPCS

## 2023-06-21 PROCEDURE — 96374 THER/PROPH/DIAG INJ IV PUSH: CPT

## 2023-06-21 RX ORDER — SODIUM CHLORIDE 0.9 % (FLUSH) 0.9 %
10 SYRINGE (ML) INJECTION
Status: DISCONTINUED | OUTPATIENT
Start: 2023-06-21 | End: 2023-06-21 | Stop reason: HOSPADM

## 2023-06-21 RX ORDER — 0.9 % SODIUM CHLORIDE 0.9 %
1000 INTRAVENOUS SOLUTION INTRAVENOUS
Status: COMPLETED | OUTPATIENT
Start: 2023-06-21 | End: 2023-06-21

## 2023-06-21 RX ORDER — KETOROLAC TROMETHAMINE 30 MG/ML
30 INJECTION, SOLUTION INTRAMUSCULAR; INTRAVENOUS ONCE
Status: COMPLETED | OUTPATIENT
Start: 2023-06-21 | End: 2023-06-21

## 2023-06-21 RX ORDER — MORPHINE SULFATE 4 MG/ML
4 INJECTION INTRAVENOUS ONCE
Status: COMPLETED | OUTPATIENT
Start: 2023-06-21 | End: 2023-06-21

## 2023-06-21 RX ORDER — 0.9 % SODIUM CHLORIDE 0.9 %
100 INTRAVENOUS SOLUTION INTRAVENOUS
Status: DISCONTINUED | OUTPATIENT
Start: 2023-06-21 | End: 2023-06-21 | Stop reason: HOSPADM

## 2023-06-21 RX ADMIN — KETOROLAC TROMETHAMINE 30 MG: 30 INJECTION, SOLUTION INTRAMUSCULAR; INTRAVENOUS at 16:49

## 2023-06-21 RX ADMIN — IOPAMIDOL 100 ML: 755 INJECTION, SOLUTION INTRAVENOUS at 18:30

## 2023-06-21 RX ADMIN — MORPHINE SULFATE 4 MG: 4 INJECTION INTRAVENOUS at 19:24

## 2023-06-21 RX ADMIN — SODIUM CHLORIDE 1000 ML: 9 INJECTION, SOLUTION INTRAVENOUS at 16:50

## 2023-06-21 ASSESSMENT — PAIN DESCRIPTION - ORIENTATION: ORIENTATION: RIGHT;LOWER

## 2023-06-21 ASSESSMENT — LIFESTYLE VARIABLES
HOW OFTEN DO YOU HAVE A DRINK CONTAINING ALCOHOL: NEVER
HOW MANY STANDARD DRINKS CONTAINING ALCOHOL DO YOU HAVE ON A TYPICAL DAY: PATIENT DOES NOT DRINK

## 2023-06-21 ASSESSMENT — PAIN - FUNCTIONAL ASSESSMENT
PAIN_FUNCTIONAL_ASSESSMENT: 0-10
PAIN_FUNCTIONAL_ASSESSMENT: 0-10

## 2023-06-21 ASSESSMENT — PAIN SCALES - GENERAL
PAINLEVEL_OUTOF10: 9
PAINLEVEL_OUTOF10: 9
PAINLEVEL_OUTOF10: 5
PAINLEVEL_OUTOF10: 7
PAINLEVEL_OUTOF10: 9

## 2023-06-21 ASSESSMENT — PAIN DESCRIPTION - DESCRIPTORS: DESCRIPTORS: ACHING;CRAMPING

## 2023-06-21 ASSESSMENT — PAIN DESCRIPTION - LOCATION: LOCATION: ABDOMEN

## 2023-06-21 NOTE — ED PROVIDER NOTES
Basophils Absolute 0.1 0.0 - 0.2 K/UL    Absolute Immature Granulocyte 0.0 0.0 - 0.5 K/UL   Lipase   Result Value Ref Range    Lipase 61 (L) 73 - 393 U/L        CT ABDOMEN PELVIS W IV CONTRAST Additional Contrast? None   Final Result      1. There is a 3.5 x 2.5 cm left ovarian cyst increased in size from the prior    exam when it measured approximately 2 cm. This is still considered benign. 2.  1 cm hypodense liver nodule, likely a hemangioma. Alon Brown M.D.    6/21/2023 7:17:00 PM                        Voice dictation software was used during the making of this note. This software is not perfect and grammatical and other typographical errors may be present. This note has not been completely proofread for errors.       JOSE Boucher  06/21/23 2038

## 2023-06-21 NOTE — ED TRIAGE NOTES
Patient arrives to ER from home, reports abdominal pain x1 week, not relieved with OTC pain medications. Reports pain in RLQ, reports that ibuprofen was helping, but today has not been relieving the pain. Denies pain with urination. Denies N/V. Reports no appetite. 145

## 2023-06-21 NOTE — ED NOTES
I have reviewed discharge instructions with the patient. The patient verbalized understanding. Patient left ED via Discharge Method: ambulatory to Home with self. Opportunity for questions and clarification provided. Patient given 0 scripts. To continue your aftercare when you leave the hospital, you may receive an automated call from our care team to check in on how you are doing. This is a free service and part of our promise to provide the best care and service to meet your aftercare needs.  If you have questions, or wish to unsubscribe from this service please call [phone]. Thank you for Choosing our [facility] Emergency Department.         Elsa Staely RN  06/21/23 6995

## 2023-06-21 NOTE — DISCHARGE INSTRUCTIONS
There were no concerning findings on your work-up today in the emergency department. You have a left-sided ovarian cyst that has increased in size to 3.5 x 2.5 cm increased in size from 2 cm from last imaging. Otherwise there were no other abnormal findings on your CT scan. Take NSAIDs or Tylenol at home as needed for pain relief. Schedule follow-up appointment with your gynecologist for an outpatient ultrasound for further assessment of ovarian cyst.  Return to the emergency department as needed.

## 2023-08-21 ENCOUNTER — OFFICE VISIT (OUTPATIENT)
Dept: FAMILY MEDICINE CLINIC | Facility: CLINIC | Age: 38
End: 2023-08-21
Payer: MEDICARE

## 2023-08-21 ENCOUNTER — NURSE TRIAGE (OUTPATIENT)
Dept: OTHER | Facility: CLINIC | Age: 38
End: 2023-08-21

## 2023-08-21 VITALS
HEIGHT: 66 IN | WEIGHT: 216.8 LBS | BODY MASS INDEX: 34.84 KG/M2 | RESPIRATION RATE: 16 BRPM | TEMPERATURE: 98.7 F | HEART RATE: 97 BPM | OXYGEN SATURATION: 98 %

## 2023-08-21 DIAGNOSIS — G43.919 INTRACTABLE MIGRAINE WITHOUT STATUS MIGRAINOSUS, UNSPECIFIED MIGRAINE TYPE: ICD-10-CM

## 2023-08-21 DIAGNOSIS — G43.001 MIGRAINE WITHOUT AURA AND WITH STATUS MIGRAINOSUS, NOT INTRACTABLE: Primary | ICD-10-CM

## 2023-08-21 PROCEDURE — 96372 THER/PROPH/DIAG INJ SC/IM: CPT | Performed by: FAMILY MEDICINE

## 2023-08-21 PROCEDURE — G8417 CALC BMI ABV UP PARAM F/U: HCPCS | Performed by: FAMILY MEDICINE

## 2023-08-21 PROCEDURE — 1036F TOBACCO NON-USER: CPT | Performed by: FAMILY MEDICINE

## 2023-08-21 PROCEDURE — 99213 OFFICE O/P EST LOW 20 MIN: CPT | Performed by: FAMILY MEDICINE

## 2023-08-21 PROCEDURE — G8427 DOCREV CUR MEDS BY ELIG CLIN: HCPCS | Performed by: FAMILY MEDICINE

## 2023-08-21 RX ORDER — UBROGEPANT 100 MG/1
100 TABLET ORAL PRN
Qty: 10 TABLET | Refills: 11 | Status: SHIPPED | OUTPATIENT
Start: 2023-08-21

## 2023-08-21 RX ORDER — KETOROLAC TROMETHAMINE 30 MG/ML
60 INJECTION, SOLUTION INTRAMUSCULAR; INTRAVENOUS ONCE
Status: COMPLETED | OUTPATIENT
Start: 2023-08-21 | End: 2023-08-21

## 2023-08-21 RX ADMIN — KETOROLAC TROMETHAMINE 60 MG: 30 INJECTION, SOLUTION INTRAMUSCULAR; INTRAVENOUS at 10:48

## 2023-08-21 ASSESSMENT — ENCOUNTER SYMPTOMS
SINUS PAIN: 0
DIARRHEA: 0
ABDOMINAL PAIN: 0
SHORTNESS OF BREATH: 0
RHINORRHEA: 0
COUGH: 0

## 2023-08-21 ASSESSMENT — PATIENT HEALTH QUESTIONNAIRE - PHQ9
SUM OF ALL RESPONSES TO PHQ QUESTIONS 1-9: 0
2. FEELING DOWN, DEPRESSED OR HOPELESS: 0
1. LITTLE INTEREST OR PLEASURE IN DOING THINGS: 0
SUM OF ALL RESPONSES TO PHQ9 QUESTIONS 1 & 2: 0
SUM OF ALL RESPONSES TO PHQ QUESTIONS 1-9: 0

## 2023-08-21 NOTE — TELEPHONE ENCOUNTER
Location of patient: [place]    Received call from [address] at [facility] with Red Flag Complaint. Subjective: Caller states \"I have a migraine. I need to get a shot for this\"     Current Symptoms:     Onset: 3 days ago;     Associated Symptoms:     Pain Severity: 10/10; ; constant    Temperature:      What has been tried: Imitrex, Excedrin    LMP: NA Pregnant: NA    Recommended disposition: See in Office Today    Care advice provided, patient verbalizes understanding; denies any other questions or concerns; instructed to call back for any new or worsening symptoms. Patient/Caller agrees with recommended disposition; writer provided warm transfer to [facility] at [facility] for appointment scheduling    Attention Provider: Thank you for allowing me to participate in the care of your patient. The patient was connected to triage in response to information provided to the ECC/PSC. Please do not respond through this encounter as the response is not directed to a shared pool.     Reason for Disposition   Patient wants to be seen    Additional Information   Negative: SEVERE headache (e.g., excruciating) and has had severe headaches before     Pt states she knows this is a migraine and wants to come into the office    Protocols used: Headache-ADULT-[place]

## 2023-08-21 NOTE — PROGRESS NOTES
PROGRESS NOTE    SUBJECTIVE:   Mio Weaver is a 44 y.o. female seen for a follow up visit regarding the following chief complaint:     Chief Complaint   Patient presents with    Migraine           HPI  Patient presents office complaining of a headache right side behind her eye denies any nausea or photophobia states that last time she had this Toradol worked and Jacobo Kenny worked but apparently cannot get a prior authorization for her Jacobo Kenny to abort her prescriptions history of failure of multiple medications last was Imitrex    Past Medical History, Past Surgical History, Family history, Social History, and Medications were all reviewed with the patient today and updated as necessary. Current Outpatient Medications   Medication Sig Dispense Refill    Ubrogepant (UBRELVY) 100 MG TABS Take 100 mg by mouth as needed (for migraine headache, may repeat dose in 2 hours as needed. Max 200mg/ day) 10 tablet 11    potassium chloride (KLOR-CON M) 20 MEQ extended release tablet Take 1 tablet by mouth daily for 90 doses 90 tablet 3    EPINEPHrine (EPIPEN) 0.3 MG/0.3ML SOAJ injection Inject 0.3 mLs into the muscle once as needed      hydroCHLOROthiazide (HYDRODIURIL) 25 MG tablet Take 1 tablet by mouth daily      linaclotide (LINZESS) 290 MCG CAPS capsule Take 1 capsule by mouth every morning (before breakfast)       No current facility-administered medications for this visit.      Allergies   Allergen Reactions    Shellfish Allergy Anaphylaxis and Hives    Shrimp Extract Allergy Skin Test Hives     facial hives and throat closing sensation     Patient Active Problem List   Diagnosis    Dyspareunia due to medical condition in female patient    Chronic constipation    Varicella    Migraine    Chronic fatigue    Palpitations    PONV (postoperative nausea and vomiting)    History of endometriosis    Meniere's disease    History of pulmonary embolus (PE)    Pulmonary embolus (HCC)    Estrogen deficiency    Arrhythmia

## 2023-09-12 NOTE — TELEPHONE ENCOUNTER
MEDICATION REFILL REQUEST      Name of Medication:  Hydrochlorothiazide   Dose:  25 mg  Frequency:    Quantity:    Days' supply:        Pharmacy Name/Location:  did not leave

## 2023-09-13 RX ORDER — HYDROCHLOROTHIAZIDE 25 MG/1
25 TABLET ORAL DAILY
Qty: 90 TABLET | Refills: 3 | Status: SHIPPED | OUTPATIENT
Start: 2023-09-13

## 2023-09-20 ENCOUNTER — HOSPITAL ENCOUNTER (EMERGENCY)
Age: 38
Discharge: HOME | End: 2023-09-20
Attending: EMERGENCY MEDICINE
Payer: MEDICARE

## 2023-09-20 VITALS
HEART RATE: 102 BPM | OXYGEN SATURATION: 99 % | DIASTOLIC BLOOD PRESSURE: 100 MMHG | BODY MASS INDEX: 34.23 KG/M2 | WEIGHT: 213 LBS | TEMPERATURE: 99.1 F | HEIGHT: 66 IN | RESPIRATION RATE: 18 BRPM | SYSTOLIC BLOOD PRESSURE: 180 MMHG

## 2023-09-20 DIAGNOSIS — N20.0 KIDNEY STONES: Primary | ICD-10-CM

## 2023-09-20 PROCEDURE — 6370000000 HC RX 637 (ALT 250 FOR IP): Performed by: EMERGENCY MEDICINE

## 2023-09-20 PROCEDURE — 99283 EMERGENCY DEPT VISIT LOW MDM: CPT

## 2023-09-20 RX ORDER — ONDANSETRON 8 MG/1
8 TABLET, ORALLY DISINTEGRATING ORAL EVERY 8 HOURS PRN
Qty: 12 TABLET | Refills: 1 | Status: SHIPPED | OUTPATIENT
Start: 2023-09-20

## 2023-09-20 RX ORDER — IBUPROFEN 800 MG/1
800 TABLET ORAL
Status: COMPLETED | OUTPATIENT
Start: 2023-09-20 | End: 2023-09-20

## 2023-09-20 RX ORDER — TAMSULOSIN HYDROCHLORIDE 0.4 MG/1
0.4 CAPSULE ORAL
Status: COMPLETED | OUTPATIENT
Start: 2023-09-20 | End: 2023-09-20

## 2023-09-20 RX ORDER — HYDROCODONE BITARTRATE AND ACETAMINOPHEN 10; 325 MG/1; MG/1
.5-1 TABLET ORAL EVERY 6 HOURS PRN
Qty: 16 TABLET | Refills: 0 | Status: SHIPPED | OUTPATIENT
Start: 2023-09-20 | End: 2023-09-25

## 2023-09-20 RX ORDER — TAMSULOSIN HYDROCHLORIDE 0.4 MG/1
0.4 CAPSULE ORAL DAILY
Qty: 10 CAPSULE | Refills: 0 | Status: SHIPPED | OUTPATIENT
Start: 2023-09-20

## 2023-09-20 RX ORDER — IBUPROFEN 800 MG/1
800 TABLET ORAL EVERY 8 HOURS PRN
Qty: 21 TABLET | Refills: 0 | Status: SHIPPED | OUTPATIENT
Start: 2023-09-20

## 2023-09-20 RX ORDER — ONDANSETRON 8 MG/1
8 TABLET, ORALLY DISINTEGRATING ORAL
Status: COMPLETED | OUTPATIENT
Start: 2023-09-20 | End: 2023-09-20

## 2023-09-20 RX ORDER — OXYCODONE HYDROCHLORIDE AND ACETAMINOPHEN 5; 325 MG/1; MG/1
2 TABLET ORAL
Status: COMPLETED | OUTPATIENT
Start: 2023-09-20 | End: 2023-09-20

## 2023-09-20 RX ADMIN — OXYCODONE AND ACETAMINOPHEN 2 TABLET: 5; 325 TABLET ORAL at 18:48

## 2023-09-20 RX ADMIN — TAMSULOSIN HYDROCHLORIDE 0.4 MG: 0.4 CAPSULE ORAL at 18:48

## 2023-09-20 RX ADMIN — IBUPROFEN 800 MG: 800 TABLET ORAL at 18:48

## 2023-09-20 RX ADMIN — ONDANSETRON 8 MG: 8 TABLET, ORALLY DISINTEGRATING ORAL at 18:49

## 2023-09-20 ASSESSMENT — PAIN DESCRIPTION - LOCATION: LOCATION: BACK

## 2023-09-20 ASSESSMENT — PAIN SCALES - GENERAL
PAINLEVEL_OUTOF10: 9
PAINLEVEL_OUTOF10: 10

## 2023-09-20 ASSESSMENT — PAIN DESCRIPTION - ORIENTATION: ORIENTATION: RIGHT

## 2023-09-20 ASSESSMENT — PAIN DESCRIPTION - DESCRIPTORS: DESCRIPTORS: ACHING

## 2023-09-20 ASSESSMENT — PAIN - FUNCTIONAL ASSESSMENT: PAIN_FUNCTIONAL_ASSESSMENT: 0-10

## 2023-09-20 NOTE — ED TRIAGE NOTES
Pt co right sided flank pain since yesterday. Sent by [address] [place] [address] for blood in urine.

## 2023-09-21 ASSESSMENT — ENCOUNTER SYMPTOMS
ABDOMINAL PAIN: 0
NAUSEA: 1
VOMITING: 0

## 2023-09-21 NOTE — ED PROVIDER NOTES
Pulmonary embolus ([address])     3 days  s/p  C/S     SVT (supraventricular tachycardia) ([address])     2016 & 2017 - Now see's Dr Jamaica Delacruz @ [facility] Cardiology    Varicella         Past Surgical History:   Procedure Laterality Date    ABLATION OF DYSRHYTHMIC FOCUS  02/15/2016    ABLATION OF DYSRHYTHMIC FOCUS  2017     SECTION      X3    COLONOSCOPY N/A 2017    COLONOSCOPY BMI 30 performed by Gary Aquino MD at [facility] ENDOSCOPY    COLONOSCOPY N/A 6/15/2020    COLONOSCOPY/BMI 33 performed by Mara Rosado MD at [facility] ENDOSCOPY    COLONOSCOPY W/BIOPSY SINGLE/MULTIPLE  2017         HEENT Left 03/10/2022    L ear tube placement- [place]   and     umbilical hernia X2    HYSTERECTOMY (CERVIX STATUS UNKNOWN)  10/2016    unilateral salping-ooph    MYOMECTOMY      ROTATOR CUFF REPAIR Right 2014    TUBAL LIGATION  2015    UROLOGICAL SURGERY  2018    CYSTOSCOPY HYDRODISTENTION OF BLADDER, urethral dilitaion        Social History     Socioeconomic History    Marital status:    Tobacco Use    Smoking status: Never     Passive exposure: Never    Smokeless tobacco: Never   Vaping Use    Vaping Use: Never used   Substance and Sexual Activity    Alcohol use: Not Currently    Drug use: No        Discharge Medication List as of 2023  6:44 PM        CONTINUE these medications which have NOT CHANGED    Details   hydroCHLOROthiazide (HYDRODIURIL) 25 MG tablet Take 1 tablet by mouth daily, Disp-90 tablet, R-3Normal      Ubrogepant (UBRELVY) 100 MG TABS Take 100 mg by mouth as needed (for migraine headache, may repeat dose in 2 hours as needed.   Max 200mg/ day), Disp-10 tablet, R-11Normal      potassium chloride (KLOR-CON M) 20 MEQ extended release tablet Take 1 tablet by mouth daily for 90 doses, Disp-90 tablet, R-3Normal      EPINEPHrine (EPIPEN) 0.3 MG/0.3ML SOAJ injection Inject 0.3 mLs into the muscle once as neededHistorical Med      linaclotide (LINZESS) 290 MCG CAPS capsule

## 2023-09-24 ENCOUNTER — APPOINTMENT (OUTPATIENT)
Dept: CT IMAGING | Age: 38
End: 2023-09-24
Payer: MEDICARE

## 2023-09-24 ENCOUNTER — HOSPITAL ENCOUNTER (EMERGENCY)
Age: 38
Discharge: HOME | End: 2023-09-24
Attending: EMERGENCY MEDICINE
Payer: MEDICARE

## 2023-09-24 VITALS
HEIGHT: 66 IN | SYSTOLIC BLOOD PRESSURE: 138 MMHG | WEIGHT: 213 LBS | TEMPERATURE: 98.9 F | DIASTOLIC BLOOD PRESSURE: 91 MMHG | RESPIRATION RATE: 16 BRPM | HEART RATE: 89 BPM | OXYGEN SATURATION: 99 % | BODY MASS INDEX: 34.23 KG/M2

## 2023-09-24 DIAGNOSIS — R10.9 FLANK PAIN: ICD-10-CM

## 2023-09-24 DIAGNOSIS — M54.31 SCIATICA OF RIGHT SIDE: Primary | ICD-10-CM

## 2023-09-24 LAB
ALBUMIN SERPL-MCNC: 4 G/DL (ref 3.5–5)
ALBUMIN/GLOB SERPL: 1.2 (ref 0.4–1.6)
ALP SERPL-CCNC: 99 U/L (ref 50–136)
ALT SERPL-CCNC: 17 U/L (ref 12–65)
ANION GAP SERPL CALC-SCNC: 1 MMOL/L (ref 2–11)
AST SERPL-CCNC: 9 U/L (ref 15–37)
BACTERIA URNS QL MICRO: NORMAL /HPF
BASOPHILS # BLD: 0.1 K/UL (ref 0–0.2)
BASOPHILS NFR BLD: 1 % (ref 0–2)
BILIRUB SERPL-MCNC: 0.2 MG/DL (ref 0.2–1.1)
BILIRUB UR QL: NEGATIVE
BUN SERPL-MCNC: 13 MG/DL (ref 6–23)
CALCIUM SERPL-MCNC: 8.9 MG/DL (ref 8.3–10.4)
CASTS URNS QL MICRO: 0 /LPF
CHLORIDE SERPL-SCNC: 109 MMOL/L (ref 101–110)
CO2 SERPL-SCNC: 34 MMOL/L (ref 21–32)
CREAT SERPL-MCNC: 0.9 MG/DL (ref 0.6–1)
CRYSTALS URNS QL MICRO: 0 /LPF
DIFFERENTIAL METHOD BLD: NORMAL
EOSINOPHIL # BLD: 0.1 K/UL (ref 0–0.8)
EOSINOPHIL NFR BLD: 1 % (ref 0.5–7.8)
EPI CELLS #/AREA URNS HPF: NORMAL /HPF
ERYTHROCYTE [DISTWIDTH] IN BLOOD BY AUTOMATED COUNT: 12.8 % (ref 11.9–14.6)
GLOBULIN SER CALC-MCNC: 3.3 G/DL (ref 2.8–4.5)
GLUCOSE SERPL-MCNC: 89 MG/DL (ref 65–100)
GLUCOSE UR QL STRIP.AUTO: NEGATIVE MG/DL
HCG UR QL: NEGATIVE
HCT VFR BLD AUTO: 39.4 % (ref 35.8–46.3)
HGB BLD-MCNC: 13 G/DL (ref 11.7–15.4)
IMM GRANULOCYTES # BLD AUTO: 0 K/UL (ref 0–0.5)
IMM GRANULOCYTES NFR BLD AUTO: 0 % (ref 0–5)
KETONES UR-MCNC: NEGATIVE MG/DL
LEUKOCYTE ESTERASE UR QL STRIP: NEGATIVE
LIPASE SERPL-CCNC: 66 U/L (ref 73–393)
LYMPHOCYTES # BLD: 2.2 K/UL (ref 0.5–4.6)
LYMPHOCYTES NFR BLD: 33 % (ref 13–44)
MCH RBC QN AUTO: 28.6 PG (ref 26.1–32.9)
MCHC RBC AUTO-ENTMCNC: 33 G/DL (ref 31.4–35)
MCV RBC AUTO: 86.6 FL (ref 82–102)
MONOCYTES # BLD: 0.5 K/UL (ref 0.1–1.3)
MONOCYTES NFR BLD: 8 % (ref 4–12)
MUCOUS THREADS URNS QL MICRO: 0 /LPF
NEUTS SEG # BLD: 3.9 K/UL (ref 1.7–8.2)
NEUTS SEG NFR BLD: 57 % (ref 43–78)
NITRITE UR QL: NEGATIVE
NRBC # BLD: 0 K/UL (ref 0–0.2)
PH UR: 7 (ref 5–9)
PLATELET # BLD AUTO: 303 K/UL (ref 150–450)
PMV BLD AUTO: 9.5 FL (ref 9.4–12.3)
POTASSIUM SERPL-SCNC: 3.8 MMOL/L (ref 3.5–5.1)
PROT SERPL-MCNC: 7.3 G/DL (ref 6.3–8.2)
PROT UR QL: 30 MG/DL
RBC # BLD AUTO: 4.55 M/UL (ref 4.05–5.2)
RBC # UR STRIP: ABNORMAL
RBC #/AREA URNS HPF: NORMAL /HPF
SERVICE CMNT-IMP: ABNORMAL
SODIUM SERPL-SCNC: 144 MMOL/L (ref 133–143)
SP GR UR: 1.02 (ref 1–1.02)
UROBILINOGEN UR QL: 1 EU/DL (ref 0.2–1)
WBC # BLD AUTO: 6.8 K/UL (ref 4.3–11.1)
WBC URNS QL MICRO: NORMAL /HPF

## 2023-09-24 PROCEDURE — 80053 COMPREHEN METABOLIC PANEL: CPT

## 2023-09-24 PROCEDURE — 6360000002 HC RX W HCPCS: Performed by: EMERGENCY MEDICINE

## 2023-09-24 PROCEDURE — 81015 MICROSCOPIC EXAM OF URINE: CPT

## 2023-09-24 PROCEDURE — 74176 CT ABD & PELVIS W/O CONTRAST: CPT

## 2023-09-24 PROCEDURE — 96374 THER/PROPH/DIAG INJ IV PUSH: CPT

## 2023-09-24 PROCEDURE — 85025 COMPLETE CBC W/AUTO DIFF WBC: CPT

## 2023-09-24 PROCEDURE — 99284 EMERGENCY DEPT VISIT MOD MDM: CPT

## 2023-09-24 PROCEDURE — 81003 URINALYSIS AUTO W/O SCOPE: CPT

## 2023-09-24 PROCEDURE — 81001 URINALYSIS AUTO W/SCOPE: CPT

## 2023-09-24 PROCEDURE — 83690 ASSAY OF LIPASE: CPT

## 2023-09-24 PROCEDURE — 81025 URINE PREGNANCY TEST: CPT

## 2023-09-24 RX ORDER — PREDNISONE 20 MG/1
20 TABLET ORAL 2 TIMES DAILY
Qty: 10 TABLET | Refills: 0 | Status: SHIPPED | OUTPATIENT
Start: 2023-09-24 | End: 2023-09-29

## 2023-09-24 RX ORDER — MORPHINE SULFATE 4 MG/ML
4 INJECTION INTRAVENOUS ONCE
Status: DISCONTINUED | OUTPATIENT
Start: 2023-09-24 | End: 2023-09-24 | Stop reason: HOSPADM

## 2023-09-24 RX ORDER — KETOROLAC TROMETHAMINE 30 MG/ML
30 INJECTION, SOLUTION INTRAMUSCULAR; INTRAVENOUS ONCE
Status: COMPLETED | OUTPATIENT
Start: 2023-09-24 | End: 2023-09-24

## 2023-09-24 RX ADMIN — KETOROLAC TROMETHAMINE 30 MG: 30 INJECTION, SOLUTION INTRAMUSCULAR at 17:50

## 2023-09-24 ASSESSMENT — PAIN - FUNCTIONAL ASSESSMENT: PAIN_FUNCTIONAL_ASSESSMENT: 0-10

## 2023-09-24 ASSESSMENT — PAIN DESCRIPTION - ORIENTATION: ORIENTATION: RIGHT

## 2023-09-24 ASSESSMENT — PAIN SCALES - GENERAL: PAINLEVEL_OUTOF10: 10

## 2023-09-24 ASSESSMENT — PAIN DESCRIPTION - LOCATION: LOCATION: LEG

## 2023-09-24 NOTE — DISCHARGE INSTRUCTIONS
As we discussed, your presentation appears consistent with sciatica. Take the steroid to help decrease inflammation and help your pain improves more quickly. Ice your upper buttock area for 20 minutes at a time, 4 times a day. Your CT scan shows no evidence of kidney stones. I do not think your discomfort is related to any kidney stone. With your primary care doctor to ensure that you are improving.

## 2023-09-24 NOTE — ED PROVIDER NOTES
person, place, and time. Sensory: No sensory deficit. Motor: No weakness.       Comments: Sensation is intact in all distributions including an S1 distribution as well as in the perineum, full strength with flexion and extension of the hips, knees, ankles and great toes and equal bilaterally          Procedures     Procedures    Orders Placed This Encounter   Procedures    CT ABDOMEN PELVIS RENAL STONE    CBC with Auto Differential    CMP    Lipase    Urinalysis, Micro    Diet NPO    POCT Urine Dipstick    POC PREGNANCY UR-QUAL    POCT Urinalysis no Micro    POC Pregnancy Urine Qual    Saline lock IV        Medications given during this emergency department visit:  Medications   ketorolac (TORADOL) injection 30 mg (has no administration in time range)   morphine injection 4 mg (has no administration in time range)       New Prescriptions    PREDNISONE (DELTASONE) 20 MG TABLET    Take 1 tablet by mouth 2 times daily for 5 days        Past Medical History:   Diagnosis Date    Acute dysfunction of both eustachian tubes 2022    Anemia     denies hx of blood transfusions    Bladder pain     Chronic lower back pain     Chronic pain     back pain-- prn meds    History of kidney stones     with pregnancy- naturally pass    Hypertension     manged with meds    IBS (irritable bowel syndrome)     Interstitial cystitis     Meniere's disease     Migraines     pt denies    Palpitations     Paresthesia and pain of right extremity 2018    PONV (postoperative nausea and vomiting)      does well with IV antiemetic    Pulmonary embolus ([address])     3 days  s/p  C/S     SVT (supraventricular tachycardia) ([address])      & 2017 - Now see's Dr Butch Lewis @ [facility]    Varicella         Past Surgical History:   Procedure Laterality Date    ABLATION OF DYSRHYTHMIC FOCUS  02/15/2016    ABLATION OF DYSRHYTHMIC FOCUS  2017     SECTION      X3    COLONOSCOPY N/A 2017    COLONOSCOPY BMI 30 performed by Dannie Chen

## 2023-09-24 NOTE — ED TRIAGE NOTES
Pt arrives to ed via car amb. Pt has complaints of right flank radiating down into leg. No issues urinating. Pt was just seen last week for kidney stones in same kidney. Pt is back for reoccurring symptoms.

## 2023-09-27 ENCOUNTER — OFFICE VISIT (OUTPATIENT)
Dept: FAMILY MEDICINE CLINIC | Facility: CLINIC | Age: 38
End: 2023-09-27
Payer: MEDICARE

## 2023-09-27 VITALS
OXYGEN SATURATION: 98 % | HEART RATE: 88 BPM | BODY MASS INDEX: 35.29 KG/M2 | RESPIRATION RATE: 16 BRPM | WEIGHT: 219.6 LBS | SYSTOLIC BLOOD PRESSURE: 140 MMHG | DIASTOLIC BLOOD PRESSURE: 88 MMHG | HEIGHT: 66 IN

## 2023-09-27 DIAGNOSIS — I10 PRIMARY HYPERTENSION: ICD-10-CM

## 2023-09-27 DIAGNOSIS — Z87.442 HISTORY OF KIDNEY STONES: ICD-10-CM

## 2023-09-27 DIAGNOSIS — R30.0 DYSURIA: ICD-10-CM

## 2023-09-27 DIAGNOSIS — R31.9 HEMATURIA, UNSPECIFIED TYPE: ICD-10-CM

## 2023-09-27 DIAGNOSIS — B37.31 VAGINAL CANDIDIASIS: ICD-10-CM

## 2023-09-27 DIAGNOSIS — R10.9 RIGHT FLANK PAIN: ICD-10-CM

## 2023-09-27 DIAGNOSIS — M54.31 SCIATICA OF RIGHT SIDE: Primary | ICD-10-CM

## 2023-09-27 LAB
BILIRUBIN, URINE, POC: NEGATIVE
BLOOD URINE, POC: NORMAL
GLUCOSE URINE, POC: NEGATIVE
KETONES, URINE, POC: NEGATIVE
LEUKOCYTE ESTERASE, URINE, POC: NEGATIVE
NITRITE, URINE, POC: NEGATIVE
PH, URINE, POC: 7 (ref 4.6–8)
PROTEIN,URINE, POC: NEGATIVE
SPECIFIC GRAVITY, URINE, POC: 1.02 (ref 1–1.03)
URINALYSIS CLARITY, POC: CLEAR
URINALYSIS COLOR, POC: YELLOW
UROBILINOGEN, POC: NORMAL

## 2023-09-27 PROCEDURE — 99214 OFFICE O/P EST MOD 30 MIN: CPT | Performed by: NURSE PRACTITIONER

## 2023-09-27 PROCEDURE — 96372 THER/PROPH/DIAG INJ SC/IM: CPT | Performed by: NURSE PRACTITIONER

## 2023-09-27 PROCEDURE — G8417 CALC BMI ABV UP PARAM F/U: HCPCS | Performed by: NURSE PRACTITIONER

## 2023-09-27 PROCEDURE — 1036F TOBACCO NON-USER: CPT | Performed by: NURSE PRACTITIONER

## 2023-09-27 PROCEDURE — 81003 URINALYSIS AUTO W/O SCOPE: CPT | Performed by: NURSE PRACTITIONER

## 2023-09-27 PROCEDURE — G8427 DOCREV CUR MEDS BY ELIG CLIN: HCPCS | Performed by: NURSE PRACTITIONER

## 2023-09-27 PROCEDURE — 3079F DIAST BP 80-89 MM HG: CPT | Performed by: NURSE PRACTITIONER

## 2023-09-27 PROCEDURE — 3077F SYST BP >= 140 MM HG: CPT | Performed by: NURSE PRACTITIONER

## 2023-09-27 RX ORDER — FLUCONAZOLE 150 MG/1
150 TABLET ORAL ONCE
Qty: 1 TABLET | Refills: 0 | Status: SHIPPED | OUTPATIENT
Start: 2023-09-27 | End: 2023-09-27

## 2023-09-27 RX ORDER — TIZANIDINE 2 MG/1
2 TABLET ORAL 2 TIMES DAILY PRN
Qty: 6 TABLET | Refills: 0 | Status: SHIPPED | OUTPATIENT
Start: 2023-09-27 | End: 2023-09-30

## 2023-09-27 RX ORDER — TRIAMCINOLONE ACETONIDE 40 MG/ML
40 INJECTION, SUSPENSION INTRA-ARTICULAR; INTRAMUSCULAR ONCE
Status: COMPLETED | OUTPATIENT
Start: 2023-09-27 | End: 2023-09-27

## 2023-09-27 RX ORDER — NITROFURANTOIN 25; 75 MG/1; MG/1
100 CAPSULE ORAL 2 TIMES DAILY
Qty: 10 CAPSULE | Refills: 0 | Status: SHIPPED | OUTPATIENT
Start: 2023-09-27 | End: 2023-10-02

## 2023-09-27 RX ADMIN — TRIAMCINOLONE ACETONIDE 40 MG: 40 INJECTION, SUSPENSION INTRA-ARTICULAR; INTRAMUSCULAR at 10:52

## 2023-09-27 SDOH — ECONOMIC STABILITY: HOUSING INSECURITY
IN THE LAST 12 MONTHS, WAS THERE A TIME WHEN YOU DID NOT HAVE A STEADY PLACE TO SLEEP OR SLEPT IN A SHELTER (INCLUDING NOW)?: NO

## 2023-09-27 SDOH — ECONOMIC STABILITY: FOOD INSECURITY: WITHIN THE PAST 12 MONTHS, YOU WORRIED THAT YOUR FOOD WOULD RUN OUT BEFORE YOU GOT MONEY TO BUY MORE.: NEVER TRUE

## 2023-09-27 SDOH — ECONOMIC STABILITY: INCOME INSECURITY: HOW HARD IS IT FOR YOU TO PAY FOR THE VERY BASICS LIKE FOOD, HOUSING, MEDICAL CARE, AND HEATING?: NOT HARD AT ALL

## 2023-09-27 SDOH — ECONOMIC STABILITY: FOOD INSECURITY: WITHIN THE PAST 12 MONTHS, THE FOOD YOU BOUGHT JUST DIDN'T LAST AND YOU DIDN'T HAVE MONEY TO GET MORE.: NEVER TRUE

## 2023-09-27 ASSESSMENT — ENCOUNTER SYMPTOMS
SHORTNESS OF BREATH: 0
VOICE CHANGE: 0
STRIDOR: 0
VOMITING: 0
ABDOMINAL DISTENTION: 0
APNEA: 0
CHEST TIGHTNESS: 0
DIARRHEA: 0
COUGH: 0
NAUSEA: 0
SINUS PAIN: 0
EYES NEGATIVE: 1
SINUS PRESSURE: 0
EYE DISCHARGE: 0
COLOR CHANGE: 0
CONSTIPATION: 0
ANAL BLEEDING: 0
FACIAL SWELLING: 0
BACK PAIN: 1
ALLERGIC/IMMUNOLOGIC NEGATIVE: 1
BLOOD IN STOOL: 0
TROUBLE SWALLOWING: 0
WHEEZING: 0
RESPIRATORY NEGATIVE: 1
GASTROINTESTINAL NEGATIVE: 1
EYE PAIN: 0
RECTAL PAIN: 0
RHINORRHEA: 0
CHOKING: 0
ABDOMINAL PAIN: 0
SORE THROAT: 0

## 2023-09-27 NOTE — PROGRESS NOTES
Hematocrit      35.8 - 46.3 % 39.4    MCV      82 - 102 FL 86.6    MCH      26.1 - 32.9 PG 28.6    MCHC      31.4 - 35.0 g/dL 33.0    RDW      11.9 - 14.6 % 12.8    Platelet Count      130 - 450 K/uL 303    MPV      9.4 - 12.3 FL 9.5    Nucleated Red Blood Cells      0.0 - 0.2 K/uL 0.00    Differential Type        AUTOMATED    Neutrophils %      43 - 78 % 57    Lymphocyte %      13 - 44 % 33    Monocytes %      4.0 - 12.0 % 8    Eosinophils %      0.5 - 7.8 % 1    Basophils %      0.0 - 2.0 % 1    Immature Granulocytes      0.0 - 5.0 % 0    Neutrophils Absolute      1.7 - 8.2 K/UL 3.9    Lymphocytes Absolute      0.5 - 4.6 K/UL 2.2    Monocytes Absolute      0.1 - 1.3 K/UL 0.5    Eosinophils Absolute      0.0 - 0.8 K/UL 0.1    Basophils Absolute      0.0 - 0.2 K/UL 0.1    Absolute Immature Granulocyte      0.0 - 0.5 K/UL 0.0    Sodium      133 - 143 mmol/L 144 (H)    Potassium      3.5 - 5.1 mmol/L 3.8    Chloride      101 - 110 mmol/L 109    CO2      21 - 32 mmol/L 34 (H)    Anion Gap      2 - 11 mmol/L 1 (L)    Glucose, Random      65 - 100 mg/dL 89    BUN,BUNPL      6 - 23 MG/DL 13    Creatinine      0.6 - 1.0 MG/DL 0.90    Est, Glom Filt Rate      >60 ml/min/1.73m2 >60    CALCIUM, SERUM, [id number]      8.3 - 10.4 MG/DL 8.9    BILIRUBIN TOTAL      0.2 - 1.1 MG/DL 0.2    ALT      12 - 65 U/L 17    AST      15 - 37 U/L 9 (L)    Alk Phos      50 - 136 U/L 99    Total Protein      6.3 - 8.2 g/dL 7.3    Albumin      3.5 - 5.0 g/dL 4.0    Globulin      2.8 - 4.5 g/dL 3.3    ALBUMIN/GLOBULIN RATIO      0.4 - 1.6   1.2    Lipase      73 - 393 U/L 66 (L)      Component      Latest Ref Rng 9/24/2023   Specific Gravity, Urine, POC      1.001 - 1.023   1.025 (H)    pH, Urine, POC      5.0 - 9.0   7.0    Protein, Urine, POC      NEG mg/dL 30 ! Glucose, UA POC      NEG mg/dL Negative    Ketones, Urine, POC      NEG mg/dL Negative    Bilirubin, Urine, POC      NEG   Negative    Blood, UA POC      NEG   SMALL !     URINE

## 2023-09-29 LAB
BACTERIA SPEC CULT: NORMAL
BACTERIA SPEC CULT: NORMAL
SERVICE CMNT-IMP: NORMAL

## 2023-10-16 ENCOUNTER — OFFICE VISIT (OUTPATIENT)
Age: 38
End: 2023-10-16
Payer: MEDICARE

## 2023-10-16 VITALS
WEIGHT: 218 LBS | SYSTOLIC BLOOD PRESSURE: 170 MMHG | DIASTOLIC BLOOD PRESSURE: 102 MMHG | BODY MASS INDEX: 35.19 KG/M2 | HEART RATE: 92 BPM

## 2023-10-16 DIAGNOSIS — I10 PRIMARY HYPERTENSION: ICD-10-CM

## 2023-10-16 DIAGNOSIS — R00.2 PALPITATIONS: Primary | ICD-10-CM

## 2023-10-16 DIAGNOSIS — I47.10 SVT (SUPRAVENTRICULAR TACHYCARDIA): ICD-10-CM

## 2023-10-16 PROCEDURE — 3077F SYST BP >= 140 MM HG: CPT | Performed by: INTERNAL MEDICINE

## 2023-10-16 PROCEDURE — G8428 CUR MEDS NOT DOCUMENT: HCPCS | Performed by: INTERNAL MEDICINE

## 2023-10-16 PROCEDURE — 1036F TOBACCO NON-USER: CPT | Performed by: INTERNAL MEDICINE

## 2023-10-16 PROCEDURE — 3080F DIAST BP >= 90 MM HG: CPT | Performed by: INTERNAL MEDICINE

## 2023-10-16 PROCEDURE — G8484 FLU IMMUNIZE NO ADMIN: HCPCS | Performed by: INTERNAL MEDICINE

## 2023-10-16 PROCEDURE — 99214 OFFICE O/P EST MOD 30 MIN: CPT | Performed by: INTERNAL MEDICINE

## 2023-10-16 PROCEDURE — G8417 CALC BMI ABV UP PARAM F/U: HCPCS | Performed by: INTERNAL MEDICINE

## 2023-11-10 NOTE — TELEPHONE ENCOUNTER
----- Message from Ronald Dias DO sent at 11/10/2023  1:09 PM EST -----  Please call her, HR is higher, above 100 much of the day. Would add toprol 25 in AM.  Follow HR and BP. See me as planned.    Thanks

## 2023-11-13 RX ORDER — METOPROLOL SUCCINATE 25 MG/1
25 TABLET, EXTENDED RELEASE ORAL DAILY
Qty: 30 TABLET | Refills: 3 | Status: SHIPPED | OUTPATIENT
Start: 2023-11-13

## 2023-11-16 ENCOUNTER — OFFICE VISIT (OUTPATIENT)
Dept: UROLOGY | Age: 38
End: 2023-11-16
Payer: MEDICARE

## 2023-11-16 DIAGNOSIS — R31.29 MICROHEMATURIA: ICD-10-CM

## 2023-11-16 DIAGNOSIS — R39.89 BLADDER PAIN: ICD-10-CM

## 2023-11-16 DIAGNOSIS — N30.10 INTERSTITIAL CYSTITIS: Primary | ICD-10-CM

## 2023-11-16 LAB
BILIRUBIN, URINE, POC: NEGATIVE
BLOOD URINE, POC: NORMAL
GLUCOSE URINE, POC: NEGATIVE
KETONES, URINE, POC: NEGATIVE
LEUKOCYTE ESTERASE, URINE, POC: NEGATIVE
NITRITE, URINE, POC: NEGATIVE
PH, URINE, POC: 7 (ref 4.6–8)
PROTEIN,URINE, POC: 30
SPECIFIC GRAVITY, URINE, POC: 1.02 (ref 1–1.03)
URINALYSIS CLARITY, POC: NORMAL
URINALYSIS COLOR, POC: NORMAL
UROBILINOGEN, POC: NORMAL

## 2023-11-16 PROCEDURE — G8427 DOCREV CUR MEDS BY ELIG CLIN: HCPCS | Performed by: NURSE PRACTITIONER

## 2023-11-16 PROCEDURE — 1036F TOBACCO NON-USER: CPT | Performed by: NURSE PRACTITIONER

## 2023-11-16 PROCEDURE — G8484 FLU IMMUNIZE NO ADMIN: HCPCS | Performed by: NURSE PRACTITIONER

## 2023-11-16 PROCEDURE — 81003 URINALYSIS AUTO W/O SCOPE: CPT | Performed by: NURSE PRACTITIONER

## 2023-11-16 PROCEDURE — 99214 OFFICE O/P EST MOD 30 MIN: CPT | Performed by: NURSE PRACTITIONER

## 2023-11-16 PROCEDURE — G8417 CALC BMI ABV UP PARAM F/U: HCPCS | Performed by: NURSE PRACTITIONER

## 2023-11-16 RX ORDER — METHENAMINE, SODIUM PHOSPHATE, MONOBASIC, MONOHYDRATE, PHENYL SALICYLATE, METHYLENE BLUE, AND HYOSCYAMINE SULFATE 118; 40.8; 36; 10; .12 MG/1; MG/1; MG/1; MG/1; MG/1
1 CAPSULE ORAL 4 TIMES DAILY PRN
Qty: 30 CAPSULE | Refills: 3 | Status: SHIPPED | OUTPATIENT
Start: 2023-11-16

## 2023-11-16 RX ORDER — HYOSCYAMINE SULFATE 0.12 MG/1
1 TABLET SUBLINGUAL
Qty: 30 EACH | Refills: 3 | Status: SHIPPED | OUTPATIENT
Start: 2023-11-16

## 2023-11-16 RX ORDER — HYDROXYZINE HYDROCHLORIDE 25 MG/1
25 TABLET, FILM COATED ORAL
Qty: 30 TABLET | Refills: 3 | Status: SHIPPED | OUTPATIENT
Start: 2023-11-16

## 2023-11-16 ASSESSMENT — ENCOUNTER SYMPTOMS
NAUSEA: 0
BACK PAIN: 0

## 2023-11-16 NOTE — PROGRESS NOTES
into the muscle once as needed      linaclotide (LINZESS) 290 MCG CAPS capsule Take 1 capsule by mouth every morning (before breakfast)       No current facility-administered medications for this visit. Allergies   Allergen Reactions    Shellfish Allergy Anaphylaxis and Hives    Shrimp Extract Allergy Skin Test Hives     facial hives and throat closing sensation     Social History     Socioeconomic History    Marital status:      Spouse name: Not on file    Number of children: Not on file    Years of education: Not on file    Highest education level: Not on file   Occupational History    Not on file   Tobacco Use    Smoking status: Never     Passive exposure: Never    Smokeless tobacco: Never   Vaping Use    Vaping Use: Never used   Substance and Sexual Activity    Alcohol use: Not Currently    Drug use: No    Sexual activity: Not on file   Other Topics Concern    Not on file   Social History Narrative    Not on file     Social Determinants of Health     Financial Resource Strain: Low Risk  (9/27/2023)    Overall Financial Resource Strain (CARDIA)     Difficulty of Paying Living Expenses: Not hard at all   Food Insecurity: No Food Insecurity (9/27/2023)    Hunger Vital Sign     Worried About Running Out of Food in the Last Year: Never true     Ran Out of Food in the Last Year: Never true   Transportation Needs: Unknown (9/27/2023)    PRAPARE - Transportation     Lack of Transportation (Medical): Not on file     Lack of Transportation (Non-Medical):  No   Physical Activity: Not on file   Stress: Not on file   Social Connections: Not on file   Intimate Partner Violence: Not on file   Housing Stability: Unknown (9/27/2023)    Housing Stability Vital Sign     Unable to Pay for Housing in the Last Year: Not on file     Number of Places Lived in the Last Year: Not on file     Unstable Housing in the Last Year: No     Family History   Problem Relation Age of Onset    No Known Problems Father     Crohn's Disease

## 2023-11-27 ENCOUNTER — OFFICE VISIT (OUTPATIENT)
Age: 38
End: 2023-11-27
Payer: MEDICARE

## 2023-11-27 VITALS
SYSTOLIC BLOOD PRESSURE: 190 MMHG | BODY MASS INDEX: 35.2 KG/M2 | DIASTOLIC BLOOD PRESSURE: 118 MMHG | HEART RATE: 91 BPM | WEIGHT: 219 LBS | HEIGHT: 66 IN

## 2023-11-27 DIAGNOSIS — I10 ESSENTIAL (PRIMARY) HYPERTENSION: ICD-10-CM

## 2023-11-27 DIAGNOSIS — I47.10 SVT (SUPRAVENTRICULAR TACHYCARDIA): ICD-10-CM

## 2023-11-27 DIAGNOSIS — I47.10 SVT (SUPRAVENTRICULAR TACHYCARDIA): Primary | ICD-10-CM

## 2023-11-27 DIAGNOSIS — I10 PRIMARY HYPERTENSION: ICD-10-CM

## 2023-11-27 DIAGNOSIS — R00.2 PALPITATIONS: ICD-10-CM

## 2023-11-27 DIAGNOSIS — R53.82 CHRONIC FATIGUE: ICD-10-CM

## 2023-11-27 LAB
ERYTHROCYTE [DISTWIDTH] IN BLOOD BY AUTOMATED COUNT: 12.7 % (ref 11.9–14.6)
HCT VFR BLD AUTO: 41.5 % (ref 35.8–46.3)
HGB BLD-MCNC: 13.7 G/DL (ref 11.7–15.4)
MCH RBC QN AUTO: 28.7 PG (ref 26.1–32.9)
MCHC RBC AUTO-ENTMCNC: 33 G/DL (ref 31.4–35)
MCV RBC AUTO: 87 FL (ref 82–102)
NRBC # BLD: 0 K/UL (ref 0–0.2)
PLATELET # BLD AUTO: 294 K/UL (ref 150–450)
PMV BLD AUTO: 9.8 FL (ref 9.4–12.3)
RBC # BLD AUTO: 4.77 M/UL (ref 4.05–5.2)
WBC # BLD AUTO: 6.4 K/UL (ref 4.3–11.1)

## 2023-11-27 PROCEDURE — 99214 OFFICE O/P EST MOD 30 MIN: CPT | Performed by: INTERNAL MEDICINE

## 2023-11-27 PROCEDURE — 3080F DIAST BP >= 90 MM HG: CPT | Performed by: INTERNAL MEDICINE

## 2023-11-27 PROCEDURE — 93000 ELECTROCARDIOGRAM COMPLETE: CPT | Performed by: INTERNAL MEDICINE

## 2023-11-27 PROCEDURE — G8417 CALC BMI ABV UP PARAM F/U: HCPCS | Performed by: INTERNAL MEDICINE

## 2023-11-27 PROCEDURE — 3077F SYST BP >= 140 MM HG: CPT | Performed by: INTERNAL MEDICINE

## 2023-11-27 PROCEDURE — G8428 CUR MEDS NOT DOCUMENT: HCPCS | Performed by: INTERNAL MEDICINE

## 2023-11-27 PROCEDURE — 1036F TOBACCO NON-USER: CPT | Performed by: INTERNAL MEDICINE

## 2023-11-27 PROCEDURE — G8484 FLU IMMUNIZE NO ADMIN: HCPCS | Performed by: INTERNAL MEDICINE

## 2023-11-27 RX ORDER — LOSARTAN POTASSIUM 100 MG/1
100 TABLET ORAL DAILY
Qty: 90 TABLET | Refills: 1 | Status: SHIPPED | OUTPATIENT
Start: 2023-11-27

## 2023-11-28 ENCOUNTER — TELEPHONE (OUTPATIENT)
Age: 38
End: 2023-11-28

## 2023-11-28 DIAGNOSIS — I10 ESSENTIAL (PRIMARY) HYPERTENSION: Primary | ICD-10-CM

## 2023-11-28 DIAGNOSIS — I47.10 SVT (SUPRAVENTRICULAR TACHYCARDIA): ICD-10-CM

## 2023-11-28 LAB
ALBUMIN SERPL-MCNC: 4 G/DL (ref 3.5–5)
ALBUMIN/GLOB SERPL: 1 (ref 0.4–1.6)
ALP SERPL-CCNC: 96 U/L (ref 50–136)
ALT SERPL-CCNC: 22 U/L (ref 12–65)
ANION GAP SERPL CALC-SCNC: 7 MMOL/L (ref 2–11)
AST SERPL-CCNC: 11 U/L (ref 15–37)
BILIRUB SERPL-MCNC: 0.2 MG/DL (ref 0.2–1.1)
BUN SERPL-MCNC: 11 MG/DL (ref 6–23)
CALCIUM SERPL-MCNC: 9 MG/DL (ref 8.3–10.4)
CHLORIDE SERPL-SCNC: 105 MMOL/L (ref 101–110)
CO2 SERPL-SCNC: 27 MMOL/L (ref 21–32)
CREAT SERPL-MCNC: 1.1 MG/DL (ref 0.6–1)
GLOBULIN SER CALC-MCNC: 4.1 G/DL (ref 2.8–4.5)
GLUCOSE SERPL-MCNC: 85 MG/DL (ref 65–100)
MAGNESIUM SERPL-MCNC: 2.4 MG/DL (ref 1.8–2.4)
POTASSIUM SERPL-SCNC: 3.1 MMOL/L (ref 3.5–5.1)
PROT SERPL-MCNC: 8.1 G/DL (ref 6.3–8.2)
SODIUM SERPL-SCNC: 139 MMOL/L (ref 133–143)
TSH, 3RD GENERATION: 0.99 UIU/ML (ref 0.36–3.74)

## 2023-11-29 NOTE — TELEPHONE ENCOUNTER
Called pt advised of Dr. Corbin Barone response. Pt gave a verbal understanding. Pt wondering if needing to continue taking KloCon 20 MeQ since is still on it? Pt is also concerned with AST and creatine levels? Pt wants to make sure nothing wrong with kidneys?

## 2023-11-29 NOTE — TELEPHONE ENCOUNTER
No K needed, can add K to diet. I expect numbers to get better off the HCTZ, and with the med changes. We will check in several weeks as planned.    Thanks

## 2023-11-29 NOTE — TELEPHONE ENCOUNTER
----- Message from Ilana Anglin DO sent at 11/28/2023 11:55 AM EST -----  Please call her, K is low. I expect this to get better off the HCTZ, but increase K in diet for now. Continue with med changes as reviewed at our appt. Call for issues. TSH was good. Good news. See me as planned. Needs BMP in 4 weeks.      Thanks

## 2023-12-05 ENCOUNTER — OFFICE VISIT (OUTPATIENT)
Dept: ENT CLINIC | Age: 38
End: 2023-12-05
Payer: COMMERCIAL

## 2023-12-05 VITALS
BODY MASS INDEX: 34.72 KG/M2 | SYSTOLIC BLOOD PRESSURE: 142 MMHG | WEIGHT: 216 LBS | DIASTOLIC BLOOD PRESSURE: 96 MMHG | HEIGHT: 66 IN

## 2023-12-05 DIAGNOSIS — Z45.89 TYMPANOSTOMY TUBE CHECK: Primary | Chronic | ICD-10-CM

## 2023-12-05 DIAGNOSIS — J34.3 HYPERTROPHY OF INFERIOR NASAL TURBINATE: Chronic | ICD-10-CM

## 2023-12-05 PROCEDURE — G8484 FLU IMMUNIZE NO ADMIN: HCPCS | Performed by: PHYSICIAN ASSISTANT

## 2023-12-05 PROCEDURE — 3077F SYST BP >= 140 MM HG: CPT | Performed by: PHYSICIAN ASSISTANT

## 2023-12-05 PROCEDURE — G8427 DOCREV CUR MEDS BY ELIG CLIN: HCPCS | Performed by: PHYSICIAN ASSISTANT

## 2023-12-05 PROCEDURE — 1036F TOBACCO NON-USER: CPT | Performed by: PHYSICIAN ASSISTANT

## 2023-12-05 PROCEDURE — 99214 OFFICE O/P EST MOD 30 MIN: CPT | Performed by: PHYSICIAN ASSISTANT

## 2023-12-05 PROCEDURE — 3080F DIAST BP >= 90 MM HG: CPT | Performed by: PHYSICIAN ASSISTANT

## 2023-12-05 PROCEDURE — G8417 CALC BMI ABV UP PARAM F/U: HCPCS | Performed by: PHYSICIAN ASSISTANT

## 2023-12-05 RX ORDER — METHYLPREDNISOLONE 4 MG/1
TABLET ORAL
Qty: 21 TABLET | Refills: 0 | Status: SHIPPED | OUTPATIENT
Start: 2023-12-05 | End: 2023-12-11

## 2023-12-05 RX ORDER — CYCLOBENZAPRINE HCL 10 MG
TABLET ORAL
COMMUNITY
Start: 2023-09-29

## 2023-12-05 RX ORDER — FLUCONAZOLE 150 MG/1
TABLET ORAL
COMMUNITY
Start: 2023-09-27

## 2023-12-05 ASSESSMENT — ENCOUNTER SYMPTOMS
RESPIRATORY NEGATIVE: 1
EYES NEGATIVE: 1
GASTROINTESTINAL NEGATIVE: 1
ALLERGIC/IMMUNOLOGIC NEGATIVE: 1

## 2023-12-05 NOTE — PROGRESS NOTES
Eli Sun is a 44 y.o. female presents today with c/o URI. For the past week she has bene feeling pressure, frontal headache, congestion, with pnd. Her left ear has some clear otorrhea for a few days but have since resolved. Throbbing in the left ear in rhythm with her heartbeat. Flonase has been consistent. She gets this annually and hasn't been adjusting the to barometric weather change well. Chief Complaint   Patient presents with    Follow-up     Patient presents for a 6 month tube check. Patient states she is having drainage in left ear and throbbing pain. Patient Active Problem List   Diagnosis    Dyspareunia due to medical condition in female patient    Chronic constipation    Varicella    Migraine    Chronic fatigue    Palpitations    PONV (postoperative nausea and vomiting)    History of endometriosis    Meniere's disease    History of pulmonary embolus (PE)    Pulmonary embolus (HCC)    Estrogen deficiency    Arrhythmia    Hypertension    Estrogen deficient vulvovaginitis    Precordial pain    IC (interstitial cystitis)    SVT (supraventricular tachycardia)    Paresthesia and pain of right extremity    Shortness of breath    Anemia    Kidney stones    Carpal tunnel syndrome, bilateral    IBS (irritable bowel syndrome)    Scoliosis of lumbosacral region due to degenerative disease of spine in adult    Anxiety    Edema    H/O hysterectomy for benign disease    Periumbilical abdominal pain    Primary stabbing headache    Right shoulder pain    Umbilical hernia without obstruction and without gangrene    Hypokalemia        Reviewed and updated this visit by provider:  Tobacco  Allergies  Meds  Problems  Med Hx  Surg Hx  Fam Hx         Review of Systems   Constitutional: Negative. HENT:  Positive for ear discharge (left) and ear pain (left). Eyes: Negative. Respiratory: Negative. Cardiovascular: Negative. Gastrointestinal: Negative. Endocrine: Negative.

## 2023-12-11 ENCOUNTER — NURSE ONLY (OUTPATIENT)
Age: 38
End: 2023-12-11

## 2023-12-11 VITALS — SYSTOLIC BLOOD PRESSURE: 152 MMHG | DIASTOLIC BLOOD PRESSURE: 108 MMHG | HEART RATE: 92 BPM

## 2023-12-11 DIAGNOSIS — I47.10 SVT (SUPRAVENTRICULAR TACHYCARDIA): ICD-10-CM

## 2023-12-11 DIAGNOSIS — I10 ESSENTIAL (PRIMARY) HYPERTENSION: ICD-10-CM

## 2023-12-11 LAB
ANION GAP SERPL CALC-SCNC: 5 MMOL/L (ref 2–11)
BUN SERPL-MCNC: 13 MG/DL (ref 6–23)
CALCIUM SERPL-MCNC: 9 MG/DL (ref 8.3–10.4)
CHLORIDE SERPL-SCNC: 107 MMOL/L (ref 103–113)
CO2 SERPL-SCNC: 26 MMOL/L (ref 21–32)
CREAT SERPL-MCNC: 1 MG/DL (ref 0.6–1)
GLUCOSE SERPL-MCNC: 88 MG/DL (ref 65–100)
POTASSIUM SERPL-SCNC: 3.2 MMOL/L (ref 3.5–5.1)
SODIUM SERPL-SCNC: 138 MMOL/L (ref 136–146)

## 2023-12-11 RX ORDER — SPIRONOLACTONE 25 MG/1
25 TABLET ORAL DAILY
Qty: 90 TABLET | Refills: 1 | Status: SHIPPED | OUTPATIENT
Start: 2023-12-11

## 2023-12-11 NOTE — TELEPHONE ENCOUNTER
Requested Prescriptions     Pending Prescriptions Disp Refills    spironolactone (ALDACTONE) 25 MG tablet 90 tablet 1     Sig: Take 1 tablet by mouth daily

## 2023-12-14 ENCOUNTER — TELEPHONE (OUTPATIENT)
Age: 38
End: 2023-12-14

## 2023-12-14 DIAGNOSIS — I10 ESSENTIAL (PRIMARY) HYPERTENSION: Primary | ICD-10-CM

## 2023-12-14 DIAGNOSIS — I47.10 SVT (SUPRAVENTRICULAR TACHYCARDIA): ICD-10-CM

## 2023-12-14 NOTE — TELEPHONE ENCOUNTER
----- Message from Connie Jeffrey DO sent at 12/12/2023  4:51 PM EST -----  She will need another BMP on 1/3 when she comes in for BP check. To follow CATHI Mercer

## 2023-12-25 NOTE — PROGRESS NOTES
capsule by mouth every morning (before breakfast)   Yes Automatic Reconciliation, Ar   spironolactone (ALDACTONE) 25 MG tablet Take 1 tablet by mouth daily  Patient not taking: Reported on 2023   Lolly Homans, DO   fluconazole (DIFLUCAN) 150 MG tablet TAKE 1 TABLET BY MOUTH EVERY DAY AS ONE DOSE  Patient not taking: Reported on 2023   Provider, MD Jaye   hydrOXYzine HCl (ATARAX) 25 MG tablet Take 1 tablet by mouth nightly as needed (urinary freuqency)  Patient not taking: Reported on 2023   SAADIA Moses - CNP     Allergies   Allergen Reactions    Shellfish Allergy Anaphylaxis and Hives    Shrimp Extract Allergy Skin Test Hives     facial hives and throat closing sensation     Past Medical History:   Diagnosis Date    Acute dysfunction of both eustachian tubes 2022    Anemia     denies hx of blood transfusions    Bladder pain     Chronic lower back pain     Chronic pain     back pain-- prn meds    History of kidney stones     with pregnancy- naturally pass    Hypertension     manged with meds    IBS (irritable bowel syndrome)     Interstitial cystitis     Meniere's disease     Migraines     pt denies    Palpitations     Paresthesia and pain of right extremity 2018    PONV (postoperative nausea and vomiting)      does well with IV antiemetic    Pulmonary embolus ([address])     3 days  s/p  C/S     SVT (supraventricular tachycardia)      & 2017 - Now see's Dr Brandt Fabry @ [facility]    Varicella      Past Surgical History:   Procedure Laterality Date    ABLATION OF DYSRHYTHMIC FOCUS  02/15/2016    ABLATION OF DYSRHYTHMIC FOCUS  2017     SECTION      X3    COLONOSCOPY N/A 2017    COLONOSCOPY BMI 30 performed by Angel Bird MD at [facility] ENDOSCOPY    COLONOSCOPY N/A 6/15/2020    COLONOSCOPY/BMI 33 performed by Joseph Roque MD at [facility] ENDOSCOPY    COLONOSCOPY W/BIOPSY SINGLE/MULTIPLE  2017         HEENT Left 03/10/2022    L

## 2023-12-26 ENCOUNTER — OFFICE VISIT (OUTPATIENT)
Age: 38
End: 2023-12-26
Payer: MEDICARE

## 2023-12-26 VITALS
SYSTOLIC BLOOD PRESSURE: 164 MMHG | HEART RATE: 96 BPM | DIASTOLIC BLOOD PRESSURE: 98 MMHG | BODY MASS INDEX: 34.86 KG/M2 | WEIGHT: 216 LBS

## 2023-12-26 DIAGNOSIS — I47.10 SVT (SUPRAVENTRICULAR TACHYCARDIA): ICD-10-CM

## 2023-12-26 DIAGNOSIS — I10 ESSENTIAL HYPERTENSION: Primary | ICD-10-CM

## 2023-12-26 PROCEDURE — G8417 CALC BMI ABV UP PARAM F/U: HCPCS | Performed by: INTERNAL MEDICINE

## 2023-12-26 PROCEDURE — G8484 FLU IMMUNIZE NO ADMIN: HCPCS | Performed by: INTERNAL MEDICINE

## 2023-12-26 PROCEDURE — 3080F DIAST BP >= 90 MM HG: CPT | Performed by: INTERNAL MEDICINE

## 2023-12-26 PROCEDURE — 99214 OFFICE O/P EST MOD 30 MIN: CPT | Performed by: INTERNAL MEDICINE

## 2023-12-26 PROCEDURE — 3077F SYST BP >= 140 MM HG: CPT | Performed by: INTERNAL MEDICINE

## 2023-12-26 PROCEDURE — G8428 CUR MEDS NOT DOCUMENT: HCPCS | Performed by: INTERNAL MEDICINE

## 2023-12-26 PROCEDURE — 1036F TOBACCO NON-USER: CPT | Performed by: INTERNAL MEDICINE

## 2023-12-26 RX ORDER — AMLODIPINE BESYLATE 5 MG/1
5 TABLET ORAL DAILY
Qty: 90 TABLET | Refills: 3 | Status: SHIPPED | OUTPATIENT
Start: 2023-12-26

## 2024-03-07 ENCOUNTER — OFFICE VISIT (OUTPATIENT)
Dept: FAMILY MEDICINE CLINIC | Facility: CLINIC | Age: 39
End: 2024-03-07
Payer: MEDICARE

## 2024-03-07 VITALS
HEIGHT: 66 IN | OXYGEN SATURATION: 98 % | BODY MASS INDEX: 34.39 KG/M2 | WEIGHT: 214 LBS | SYSTOLIC BLOOD PRESSURE: 150 MMHG | RESPIRATION RATE: 16 BRPM | DIASTOLIC BLOOD PRESSURE: 95 MMHG | HEART RATE: 87 BPM

## 2024-03-07 DIAGNOSIS — G43.909 MIGRAINE WITHOUT STATUS MIGRAINOSUS, NOT INTRACTABLE, UNSPECIFIED MIGRAINE TYPE: Primary | ICD-10-CM

## 2024-03-07 DIAGNOSIS — I10 HYPERTENSION, UNSPECIFIED TYPE: ICD-10-CM

## 2024-03-07 PROCEDURE — G8484 FLU IMMUNIZE NO ADMIN: HCPCS | Performed by: NURSE PRACTITIONER

## 2024-03-07 PROCEDURE — 1036F TOBACCO NON-USER: CPT | Performed by: NURSE PRACTITIONER

## 2024-03-07 PROCEDURE — G8417 CALC BMI ABV UP PARAM F/U: HCPCS | Performed by: NURSE PRACTITIONER

## 2024-03-07 PROCEDURE — 99213 OFFICE O/P EST LOW 20 MIN: CPT | Performed by: NURSE PRACTITIONER

## 2024-03-07 PROCEDURE — 3080F DIAST BP >= 90 MM HG: CPT | Performed by: NURSE PRACTITIONER

## 2024-03-07 PROCEDURE — 3077F SYST BP >= 140 MM HG: CPT | Performed by: NURSE PRACTITIONER

## 2024-03-07 PROCEDURE — G8427 DOCREV CUR MEDS BY ELIG CLIN: HCPCS | Performed by: NURSE PRACTITIONER

## 2024-03-07 PROCEDURE — 96372 THER/PROPH/DIAG INJ SC/IM: CPT | Performed by: NURSE PRACTITIONER

## 2024-03-07 RX ORDER — PREDNISONE 20 MG/1
TABLET ORAL
Qty: 11 TABLET | Refills: 0 | Status: SHIPPED | OUTPATIENT
Start: 2024-03-07

## 2024-03-07 RX ORDER — KETOROLAC TROMETHAMINE 30 MG/ML
30 INJECTION, SOLUTION INTRAMUSCULAR; INTRAVENOUS ONCE
Status: COMPLETED | OUTPATIENT
Start: 2024-03-07 | End: 2024-03-07

## 2024-03-07 RX ORDER — AMLODIPINE BESYLATE 10 MG/1
10 TABLET ORAL DAILY
Qty: 90 TABLET | Refills: 1 | Status: SHIPPED | OUTPATIENT
Start: 2024-03-07

## 2024-03-07 RX ADMIN — KETOROLAC TROMETHAMINE 30 MG: 30 INJECTION, SOLUTION INTRAMUSCULAR; INTRAVENOUS at 16:22

## 2024-03-07 ASSESSMENT — ENCOUNTER SYMPTOMS
VOICE CHANGE: 0
TROUBLE SWALLOWING: 0
GASTROINTESTINAL NEGATIVE: 1
EYE DISCHARGE: 0
SHORTNESS OF BREATH: 0
COUGH: 0
COLOR CHANGE: 0
PHOTOPHOBIA: 1
VOMITING: 0
BACK PAIN: 0
STRIDOR: 0
CONSTIPATION: 0
APNEA: 0
WHEEZING: 0
CHOKING: 0
NAUSEA: 0
FACIAL SWELLING: 0
ANAL BLEEDING: 0
RECTAL PAIN: 0
BLOOD IN STOOL: 0
SORE THROAT: 0
ABDOMINAL DISTENTION: 0
DIARRHEA: 0
SINUS PAIN: 0
RESPIRATORY NEGATIVE: 1
EYE PAIN: 0
SINUS PRESSURE: 0
ABDOMINAL PAIN: 0
EYE REDNESS: 0
EYE ITCHING: 0
RHINORRHEA: 0
CHEST TIGHTNESS: 0
ALLERGIC/IMMUNOLOGIC NEGATIVE: 1

## 2024-03-07 ASSESSMENT — PATIENT HEALTH QUESTIONNAIRE - PHQ9
SUM OF ALL RESPONSES TO PHQ QUESTIONS 1-9: 0
1. LITTLE INTEREST OR PLEASURE IN DOING THINGS: 0
SUM OF ALL RESPONSES TO PHQ9 QUESTIONS 1 & 2: 0
2. FEELING DOWN, DEPRESSED OR HOPELESS: 0

## 2024-03-07 NOTE — PROGRESS NOTES
[place] [address]  Phone: [phone] Fax [phone]  Hayden Boyd MS, APRN, FNP-C  3/7/2024  Chief Complaint   Patient presents with   • Migraine     See below      Pt who has a hx of IC, SVT, HTN, and migraine headaches reports having increased frequency of migraine headaches over past few weeks. Pt reports over past few days that she has had daily migraine headache. Pt reports taking PRN Ubrelvy and PRN OTC Excedrin Migraine with temporary relief but the migraine headache returns. Pt reports having some associated photophobia but denies any blurred vision, dizziness, syncope/near syncope, focal weakness/neuro defs, CP, palpitations, SOB, N/V/D/abd pain. Pt reports taking her Amlodipine 5 mg po daily as directed for her HTN but reports that her BP has been elevated lately as well. Pt's BP was 150/95 in office today (goal <140/90).LMP-hysterectomy.     ASSESSMENT/PLAN:  Below is the assessment and plan developed based on review of pertinent history, physical exam, labs, studies, and medications.    1. Migraine without status migrainosus, not intractable, unspecified migraine type  Pt who has a hx of IC, SVT, HTN, and migraine headaches reports having increased frequency of migraine headaches over past few weeks. Pt reports over past few days that she has had daily migraine headache. Pt reports taking PRN Ubrelvy and PRN OTC Excedrin Migraine with temporary relief but the migraine headache returns. Pt reports having some associated photophobia but denies any blurred vision, dizziness, syncope/near syncope, focal weakness/neuro defs, CP, palpitations, SOB, N/V/D/abd pain. Pt reports taking her Amlodipine 5 mg po daily as directed for her HTN but reports that her BP has been elevated lately as well. Pt's BP was 150/95 in office today (goal <140/90). Discussed with pt. Pt has not had any PRN OTC Excedrin Migraine today. Will have pt stop PRN OTC Excedrin

## 2024-03-12 DIAGNOSIS — I47.10 SVT (SUPRAVENTRICULAR TACHYCARDIA): ICD-10-CM

## 2024-03-12 DIAGNOSIS — I10 ESSENTIAL (PRIMARY) HYPERTENSION: ICD-10-CM

## 2024-03-12 LAB
ANION GAP SERPL CALC-SCNC: 3 MMOL/L (ref 2–11)
BUN SERPL-MCNC: 11 MG/DL (ref 6–23)
CALCIUM SERPL-MCNC: 9 MG/DL (ref 8.3–10.4)
CHLORIDE SERPL-SCNC: 106 MMOL/L (ref 103–113)
CO2 SERPL-SCNC: 31 MMOL/L (ref 21–32)
CREAT SERPL-MCNC: 1 MG/DL (ref 0.6–1)
GLUCOSE SERPL-MCNC: 74 MG/DL (ref 65–100)
POTASSIUM SERPL-SCNC: 3.1 MMOL/L (ref 3.5–5.1)
SODIUM SERPL-SCNC: 140 MMOL/L (ref 136–146)

## 2024-03-13 ENCOUNTER — TELEPHONE (OUTPATIENT)
Age: 39
End: 2024-03-13

## 2024-03-13 NOTE — TELEPHONE ENCOUNTER
Called pt advised of Dr. Horton's response. Pt gave a verbal understanding.     Pt states unable to take more than the 1 tab for the K due to upsetting her stomach. Advised will discuss with the dr and will let know what needs to be done.

## 2024-03-13 NOTE — TELEPHONE ENCOUNTER
----- Message from Gregorio Horton DO sent at 3/12/2024  2:33 PM EDT -----  Please call her, she needs to double K she is taking.   Another BMP and mag in 3 weeks.   How does she feel on meds.   If worsening, needs to see someone else this week if we can.   Thanks

## 2024-03-14 DIAGNOSIS — J32.9 RECURRENT SINUSITIS: ICD-10-CM

## 2024-03-14 RX ORDER — FLUTICASONE PROPIONATE 50 MCG
2 SPRAY, SUSPENSION (ML) NASAL DAILY
Refills: 3 | OUTPATIENT
Start: 2024-03-14

## 2024-03-14 RX ORDER — HYDROCHLOROTHIAZIDE 25 MG/1
25 TABLET ORAL DAILY
COMMUNITY

## 2024-03-14 RX ORDER — POTASSIUM CHLORIDE 20 MEQ/1
20 TABLET, EXTENDED RELEASE ORAL 2 TIMES DAILY
COMMUNITY

## 2024-03-14 NOTE — TELEPHONE ENCOUNTER
She needs to stop HCTZ because this is lowering her potassium.  I would stop her HCTZ and place her on spironolactone 50 mg a day.  If her blood pressure is low she should hold her amlodipine.  Continue potassium 1 tablet a day and check BMP next week.       I notified pt.of MD response and she does not want to make any of the med changes  advised.She wants to get advice from her urologist.Noted.

## 2024-03-14 NOTE — TELEPHONE ENCOUNTER
recently increased potassium from 20meq qday to BID.She takes the med w/food and water.Potassium tablets causing her stomach to cramp when she takes potassium chloride 20meq bid.She does fine on once a day dose.Most recent potassium level was 3.1.She denies n/v just has stomach cramping.She is wanting to cut back to once daily.I told her her potassium is low.I will discuss w/doctor and see what they advise and call her back.

## 2024-03-14 NOTE — TELEPHONE ENCOUNTER
,  The ER doctor stopped her Spironolactone  due to low BP.She then came back later w/swelling HCTZ 25mg qday was added.She has continued her Norvasc 10mg qday.HCTZ is the only diuretic that seems to help w/her urination(Spironolactone did not do much she says).Meds have not been updated on med list from previous changes.What to do next?  (I will update her med list after advice from you)

## 2024-03-14 NOTE — TELEPHONE ENCOUNTER
Pb Gomes MD  You21 minutes ago (1:04 PM)       Increase her spironolactone to 50 mg a day and decrease her potassium to once daily.  Recheck BMP in 2 weeks

## 2024-03-15 ENCOUNTER — TELEPHONE (OUTPATIENT)
Dept: FAMILY MEDICINE CLINIC | Facility: CLINIC | Age: 39
End: 2024-03-15

## 2024-03-15 NOTE — TELEPHONE ENCOUNTER
Called patient and told her we will have to rescheduled her appointment because she didn't get labs done for Dr. Collins. Patient states she did get labs done. I told her she got Dr. Ruiz labs done, not Dr. Collins done. I told her she had an appointment with yenni for labs on 3/12. Patient states she wasn't aware of it and no one told her.   I apologized and told her unfortunately per dr. Collins protocol she will have to get them drawn before she can come in.

## 2024-03-15 NOTE — TELEPHONE ENCOUNTER
I offered an appointment on march 26th at 9:50am patient states she will take that appointment. Labs rescheduled for Tuesday 19th. I told patient to come in the morning and come fasting. Patient verbalized understanding.

## 2024-03-19 ENCOUNTER — NURSE ONLY (OUTPATIENT)
Dept: FAMILY MEDICINE CLINIC | Facility: CLINIC | Age: 39
End: 2024-03-19
Payer: MEDICARE

## 2024-03-19 DIAGNOSIS — Z00.00 LABORATORY EXAMINATION ORDERED AS PART OF A ROUTINE GENERAL MEDICAL EXAMINATION: Primary | ICD-10-CM

## 2024-03-19 DIAGNOSIS — E55.9 VITAMIN D DEFICIENCY, UNSPECIFIED: ICD-10-CM

## 2024-03-19 LAB
25(OH)D3 SERPL-MCNC: 8.8 NG/ML (ref 30–100)
ALBUMIN SERPL-MCNC: 4.2 G/DL (ref 3.5–5)
ALBUMIN/GLOB SERPL: 1.1 (ref 0.4–1.6)
ALP SERPL-CCNC: 99 U/L (ref 50–136)
ALT SERPL-CCNC: 17 U/L (ref 12–65)
AST SERPL-CCNC: 14 U/L (ref 15–37)
BILIRUB DIRECT SERPL-MCNC: 0.1 MG/DL
BILIRUB SERPL-MCNC: 0.3 MG/DL (ref 0.2–1.1)
BILIRUBIN, URINE, POC: NEGATIVE
BLOOD URINE, POC: ABNORMAL
CHOLEST SERPL-MCNC: 188 MG/DL
GLOBULIN SER CALC-MCNC: 3.8 G/DL (ref 2.8–4.5)
GLUCOSE URINE, POC: NEGATIVE
GRANS ABSOLUTE, POC: 2.8 K/UL
GRANULOCYTES %, POC: 42.5 %
HDLC SERPL-MCNC: 47 MG/DL (ref 40–60)
HDLC SERPL: 4
HEMATOCRIT, POC: 44.3 %
HEMOGLOBIN, POC: 14.3 G/DL
KETONES, URINE, POC: NEGATIVE
LDLC SERPL CALC-MCNC: 122.8 MG/DL
LEUKOCYTE ESTERASE, URINE, POC: NEGATIVE
LYMPHOCYTE %, POC: 48.1 %
LYMPHS ABSOLUTE, POC: 3.2 K/UL
MCH, POC: NORMAL PG (ref 40–?)
MCHC, POC: 32.3
MCV, POC: 92.1
MONOCYTE %, POC: 9.4 %
MONOCYTE, ABSOLUTE POC: 0.6 K/UL
MPV, POC: 7.6 FL
NITRITE, URINE, POC: NEGATIVE
PH, URINE, POC: 6.5 (ref 4.6–8)
PLATELET COUNT, POC: 295 K/UL
PROT SERPL-MCNC: 8 G/DL (ref 6.3–8.2)
PROTEIN,URINE, POC: ABNORMAL
RBC, POC: 4.81 M/UL
RDW, POC: 13.7 %
SPECIFIC GRAVITY, URINE, POC: 1.02 (ref 1–1.03)
TRIGL SERPL-MCNC: 91 MG/DL (ref 35–150)
TSH, 3RD GENERATION: 1.64 UIU/ML (ref 0.36–3.74)
URINALYSIS CLARITY, POC: ABNORMAL
URINALYSIS COLOR, POC: YELLOW
UROBILINOGEN, POC: NORMAL
VLDLC SERPL CALC-MCNC: 18.2 MG/DL (ref 6–23)
WBC, POC: 6.7 K/UL

## 2024-03-19 PROCEDURE — 85025 COMPLETE CBC W/AUTO DIFF WBC: CPT | Performed by: FAMILY MEDICINE

## 2024-03-19 PROCEDURE — 81003 URINALYSIS AUTO W/O SCOPE: CPT | Performed by: FAMILY MEDICINE

## 2024-03-21 ENCOUNTER — OFFICE VISIT (OUTPATIENT)
Dept: FAMILY MEDICINE CLINIC | Facility: CLINIC | Age: 39
End: 2024-03-21

## 2024-03-21 VITALS
SYSTOLIC BLOOD PRESSURE: 155 MMHG | DIASTOLIC BLOOD PRESSURE: 110 MMHG | WEIGHT: 216 LBS | OXYGEN SATURATION: 99 % | HEIGHT: 66 IN | BODY MASS INDEX: 34.72 KG/M2 | HEART RATE: 86 BPM

## 2024-03-21 DIAGNOSIS — E55.9 VITAMIN D DEFICIENCY, UNSPECIFIED: ICD-10-CM

## 2024-03-21 DIAGNOSIS — I10 PRIMARY HYPERTENSION: ICD-10-CM

## 2024-03-21 DIAGNOSIS — I10 HYPERTENSION, UNSPECIFIED TYPE: ICD-10-CM

## 2024-03-21 DIAGNOSIS — Z13.31 SCREENING FOR DEPRESSION: ICD-10-CM

## 2024-03-21 DIAGNOSIS — Z00.00 ROUTINE GENERAL MEDICAL EXAMINATION AT A HEALTH CARE FACILITY: Primary | ICD-10-CM

## 2024-03-21 DIAGNOSIS — E78.01 FAMILIAL HYPERCHOLESTEROLEMIA: ICD-10-CM

## 2024-03-21 DIAGNOSIS — G43.909 MIGRAINE WITHOUT STATUS MIGRAINOSUS, NOT INTRACTABLE, UNSPECIFIED MIGRAINE TYPE: ICD-10-CM

## 2024-03-21 RX ORDER — SPIRONOLACTONE 25 MG/1
25 TABLET ORAL DAILY
COMMUNITY
End: 2024-03-21 | Stop reason: SDUPTHER

## 2024-03-21 RX ORDER — SPIRONOLACTONE 50 MG/1
50 TABLET, FILM COATED ORAL DAILY
Qty: 30 TABLET | Refills: 1 | Status: SHIPPED | OUTPATIENT
Start: 2024-03-21

## 2024-03-21 RX ORDER — POTASSIUM CHLORIDE 20 MEQ/1
20 TABLET, EXTENDED RELEASE ORAL 2 TIMES DAILY
Qty: 60 TABLET | Refills: 1 | Status: SHIPPED | OUTPATIENT
Start: 2024-03-21

## 2024-03-21 ASSESSMENT — ENCOUNTER SYMPTOMS
COUGH: 0
ABDOMINAL PAIN: 0
SHORTNESS OF BREATH: 0

## 2024-03-21 NOTE — PROGRESS NOTES
PROGRESS NOTE    SUBJECTIVE:   Sri Guy is a 38 y.o. female seen for a follow up visit regarding the following chief complaint:     Chief Complaint   Patient presents with    Annual Exam           HPI patient presents the office today for complete physical stating that her blood pressure is up and she was not able to get in contact with her cardiologist about her blood pressure      Past Medical History, Past Surgical History, Family history, Social History, and Medications were all reviewed with the patient today and updated as necessary.       Current Outpatient Medications   Medication Sig Dispense Refill    spironolactone (ALDACTONE) 50 MG tablet Take 1 tablet by mouth daily 30 tablet 1    potassium chloride (KLOR-CON M) 20 MEQ extended release tablet Take 1 tablet by mouth 2 times daily 60 tablet 1    amLODIPine (NORVASC) 10 MG tablet Take 1 tablet by mouth daily 90 tablet 1    cyclobenzaprine (FLEXERIL) 10 MG tablet TAKE 1 AND 1/2 TABLETS BY MOUTH THREE TIMES DAILY AS NEEDED      Meth-Hyo-M Bl-Na Phos-Ph Sal (URIBEL) 118 MG CAPS Take 1 capsule by mouth 4 times daily as needed (bladder pain) 30 capsule 3    Hyoscyamine Sulfate SL (LEVSIN/SL) 0.125 MG SUBL Place 1 tablet under the tongue every 4-6 hours as needed (spasms) 30 each 3    hydrOXYzine HCl (ATARAX) 25 MG tablet Take 1 tablet by mouth nightly as needed (urinary freuqency) 30 tablet 3    Ubrogepant (UBRELVY) 100 MG TABS Take 100 mg by mouth as needed (for migraine headache, may repeat dose in 2 hours as needed.  Max 200mg/ day) 10 tablet 11    EPINEPHrine (EPIPEN) 0.3 MG/0.3ML SOAJ injection Inject 0.3 mLs into the muscle once as needed      linaclotide (LINZESS) 290 MCG CAPS capsule Take 1 capsule by mouth every morning (before breakfast)       No current facility-administered medications for this visit.     Allergies   Allergen Reactions    Shellfish Allergy Anaphylaxis and Hives    Shrimp Extract Hives     facial hives and throat closing

## 2024-04-11 ENCOUNTER — OFFICE VISIT (OUTPATIENT)
Dept: FAMILY MEDICINE CLINIC | Facility: CLINIC | Age: 39
End: 2024-04-11
Payer: MEDICARE

## 2024-04-11 VITALS
BODY MASS INDEX: 34.55 KG/M2 | HEART RATE: 97 BPM | DIASTOLIC BLOOD PRESSURE: 80 MMHG | WEIGHT: 215 LBS | HEIGHT: 66 IN | SYSTOLIC BLOOD PRESSURE: 130 MMHG | OXYGEN SATURATION: 96 %

## 2024-04-11 DIAGNOSIS — B35.4 TINEA CORPORIS: Primary | ICD-10-CM

## 2024-04-11 PROCEDURE — 99213 OFFICE O/P EST LOW 20 MIN: CPT | Performed by: FAMILY MEDICINE

## 2024-04-11 PROCEDURE — G8427 DOCREV CUR MEDS BY ELIG CLIN: HCPCS | Performed by: FAMILY MEDICINE

## 2024-04-11 PROCEDURE — 1036F TOBACCO NON-USER: CPT | Performed by: FAMILY MEDICINE

## 2024-04-11 PROCEDURE — 3079F DIAST BP 80-89 MM HG: CPT | Performed by: FAMILY MEDICINE

## 2024-04-11 PROCEDURE — G8417 CALC BMI ABV UP PARAM F/U: HCPCS | Performed by: FAMILY MEDICINE

## 2024-04-11 PROCEDURE — 3075F SYST BP GE 130 - 139MM HG: CPT | Performed by: FAMILY MEDICINE

## 2024-04-11 RX ORDER — CLOTRIMAZOLE AND BETAMETHASONE DIPROPIONATE 10; .64 MG/G; MG/G
CREAM TOPICAL
Qty: 45 G | Refills: 3 | Status: SHIPPED | OUTPATIENT
Start: 2024-04-11

## 2024-04-11 ASSESSMENT — ENCOUNTER SYMPTOMS: SHORTNESS OF BREATH: 0

## 2024-04-11 NOTE — PROGRESS NOTES
PROGRESS NOTE    SUBJECTIVE:   Sri Guy is a 38 y.o. female seen for a follow up visit regarding the following chief complaint:     Chief Complaint   Patient presents with    Rash     Rash on the abdominal are for the past 2 months           HPI patient presents office complaining of a rash on her abdomen      Past Medical History, Past Surgical History, Family history, Social History, and Medications were all reviewed with the patient today and updated as necessary.       Current Outpatient Medications   Medication Sig Dispense Refill    clotrimazole-betamethasone (LOTRISONE) 1-0.05 % cream Apply topically 2 times daily. 45 g 3    spironolactone (ALDACTONE) 50 MG tablet Take 1 tablet by mouth daily 30 tablet 1    potassium chloride (KLOR-CON M) 20 MEQ extended release tablet Take 1 tablet by mouth 2 times daily 60 tablet 1    amLODIPine (NORVASC) 10 MG tablet Take 1 tablet by mouth daily 90 tablet 1    cyclobenzaprine (FLEXERIL) 10 MG tablet TAKE 1 AND 1/2 TABLETS BY MOUTH THREE TIMES DAILY AS NEEDED      Meth-Hyo-M Bl-Na Phos-Ph Sal (URIBEL) 118 MG CAPS Take 1 capsule by mouth 4 times daily as needed (bladder pain) 30 capsule 3    Hyoscyamine Sulfate SL (LEVSIN/SL) 0.125 MG SUBL Place 1 tablet under the tongue every 4-6 hours as needed (spasms) 30 each 3    hydrOXYzine HCl (ATARAX) 25 MG tablet Take 1 tablet by mouth nightly as needed (urinary freuqency) 30 tablet 3    Ubrogepant (UBRELVY) 100 MG TABS Take 100 mg by mouth as needed (for migraine headache, may repeat dose in 2 hours as needed.  Max 200mg/ day) 10 tablet 11    EPINEPHrine (EPIPEN) 0.3 MG/0.3ML SOAJ injection Inject 0.3 mLs into the muscle once as needed      linaclotide (LINZESS) 290 MCG CAPS capsule Take 1 capsule by mouth every morning (before breakfast)       No current facility-administered medications for this visit.     Allergies   Allergen Reactions    Shellfish Allergy Anaphylaxis and Hives    Shrimp Extract Hives     facial hives

## 2024-05-01 ENCOUNTER — TELEPHONE (OUTPATIENT)
Dept: FAMILY MEDICINE CLINIC | Facility: CLINIC | Age: 39
End: 2024-05-01

## 2024-05-02 ENCOUNTER — OFFICE VISIT (OUTPATIENT)
Dept: FAMILY MEDICINE CLINIC | Facility: CLINIC | Age: 39
End: 2024-05-02
Payer: MEDICARE

## 2024-05-02 VITALS
WEIGHT: 215 LBS | OXYGEN SATURATION: 98 % | HEART RATE: 87 BPM | HEIGHT: 66 IN | BODY MASS INDEX: 34.55 KG/M2 | SYSTOLIC BLOOD PRESSURE: 140 MMHG | DIASTOLIC BLOOD PRESSURE: 90 MMHG

## 2024-05-02 DIAGNOSIS — N64.4 BREAST PAIN: ICD-10-CM

## 2024-05-02 DIAGNOSIS — I10 HYPERTENSION, UNSPECIFIED TYPE: ICD-10-CM

## 2024-05-02 DIAGNOSIS — T78.2XXD ANAPHYLAXIS, SUBSEQUENT ENCOUNTER: Primary | ICD-10-CM

## 2024-05-02 PROCEDURE — 1036F TOBACCO NON-USER: CPT | Performed by: FAMILY MEDICINE

## 2024-05-02 PROCEDURE — 99213 OFFICE O/P EST LOW 20 MIN: CPT | Performed by: FAMILY MEDICINE

## 2024-05-02 PROCEDURE — 3077F SYST BP >= 140 MM HG: CPT | Performed by: FAMILY MEDICINE

## 2024-05-02 PROCEDURE — G8427 DOCREV CUR MEDS BY ELIG CLIN: HCPCS | Performed by: FAMILY MEDICINE

## 2024-05-02 PROCEDURE — 3080F DIAST BP >= 90 MM HG: CPT | Performed by: FAMILY MEDICINE

## 2024-05-02 PROCEDURE — G8417 CALC BMI ABV UP PARAM F/U: HCPCS | Performed by: FAMILY MEDICINE

## 2024-05-02 RX ORDER — HYDROCODONE BITARTRATE AND ACETAMINOPHEN 10; 325 MG/1; MG/1
TABLET ORAL
COMMUNITY
Start: 2024-05-01

## 2024-05-02 RX ORDER — SPIRONOLACTONE 100 MG/1
100 TABLET, FILM COATED ORAL DAILY
Qty: 90 TABLET | Refills: 3 | Status: SHIPPED | OUTPATIENT
Start: 2024-05-02

## 2024-05-02 RX ORDER — AMLODIPINE BESYLATE 10 MG/1
10 TABLET ORAL DAILY
Qty: 90 TABLET | Refills: 3 | Status: SHIPPED | OUTPATIENT
Start: 2024-05-02

## 2024-05-02 RX ORDER — EPINEPHRINE 0.3 MG/.3ML
0.3 INJECTION SUBCUTANEOUS
Qty: 0.3 ML | Refills: 0 | Status: SHIPPED | OUTPATIENT
Start: 2024-05-02 | End: 2024-05-02

## 2024-05-02 RX ORDER — TIZANIDINE 2 MG/1
TABLET ORAL
COMMUNITY
Start: 2024-05-01

## 2024-05-02 ASSESSMENT — ENCOUNTER SYMPTOMS
NAUSEA: 0
VOMITING: 0
SHORTNESS OF BREATH: 0

## 2024-05-02 NOTE — PROGRESS NOTES
PROGRESS NOTE    SUBJECTIVE:   Sri Guy is a 38 y.o. female seen for a follow up visit regarding the following chief complaint:     Chief Complaint   Patient presents with    Blood Pressure Check           HPI patient presents office for blood pressure check without any new complaints    Past Medical History, Past Surgical History, Family history, Social History, and Medications were all reviewed with the patient today and updated as necessary.       Current Outpatient Medications   Medication Sig Dispense Refill    HYDROcodone-acetaminophen (NORCO)  MG per tablet       tiZANidine (ZANAFLEX) 2 MG tablet       EPINEPHrine (EPIPEN) 0.3 MG/0.3ML SOAJ injection Inject 0.3 mLs into the muscle once as needed (as needed) 0.3 mL 0    spironolactone (ALDACTONE) 100 MG tablet Take 1 tablet by mouth daily 90 tablet 3    amLODIPine (NORVASC) 10 MG tablet Take 1 tablet by mouth daily 90 tablet 3    clotrimazole-betamethasone (LOTRISONE) 1-0.05 % cream Apply topically 2 times daily. 45 g 3    potassium chloride (KLOR-CON M) 20 MEQ extended release tablet Take 1 tablet by mouth 2 times daily 60 tablet 1    cyclobenzaprine (FLEXERIL) 10 MG tablet TAKE 1 AND 1/2 TABLETS BY MOUTH THREE TIMES DAILY AS NEEDED      Meth-Hyo-M Bl-Na Phos-Ph Sal (URIBEL) 118 MG CAPS Take 1 capsule by mouth 4 times daily as needed (bladder pain) 30 capsule 3    Hyoscyamine Sulfate SL (LEVSIN/SL) 0.125 MG SUBL Place 1 tablet under the tongue every 4-6 hours as needed (spasms) 30 each 3    hydrOXYzine HCl (ATARAX) 25 MG tablet Take 1 tablet by mouth nightly as needed (urinary freuqency) 30 tablet 3    Ubrogepant (UBRELVY) 100 MG TABS Take 100 mg by mouth as needed (for migraine headache, may repeat dose in 2 hours as needed.  Max 200mg/ day) 10 tablet 11    linaclotide (LINZESS) 290 MCG CAPS capsule Take 1 capsule by mouth every morning (before breakfast)       No current facility-administered medications for this visit.     Allergies

## 2024-05-06 DIAGNOSIS — I47.10 SVT (SUPRAVENTRICULAR TACHYCARDIA) (HCC): ICD-10-CM

## 2024-05-06 DIAGNOSIS — R53.82 CHRONIC FATIGUE: ICD-10-CM

## 2024-05-06 DIAGNOSIS — I10 ESSENTIAL (PRIMARY) HYPERTENSION: ICD-10-CM

## 2024-05-06 DIAGNOSIS — I47.10 SVT (SUPRAVENTRICULAR TACHYCARDIA) (HCC): Primary | ICD-10-CM

## 2024-05-06 LAB
ANION GAP SERPL CALC-SCNC: 11 MMOL/L (ref 9–18)
BUN SERPL-MCNC: 11 MG/DL (ref 6–23)
CALCIUM SERPL-MCNC: 9.1 MG/DL (ref 8.8–10.2)
CHLORIDE SERPL-SCNC: 100 MMOL/L (ref 98–107)
CO2 SERPL-SCNC: 26 MMOL/L (ref 20–28)
CREAT SERPL-MCNC: 0.82 MG/DL (ref 0.6–1.1)
GLUCOSE SERPL-MCNC: 98 MG/DL (ref 70–99)
MAGNESIUM SERPL-MCNC: 1.8 MG/DL (ref 1.8–2.4)
POTASSIUM SERPL-SCNC: 3.7 MMOL/L (ref 3.5–5.1)
SODIUM SERPL-SCNC: 137 MMOL/L (ref 136–145)

## 2024-05-08 ENCOUNTER — OFFICE VISIT (OUTPATIENT)
Age: 39
End: 2024-05-08
Payer: MEDICARE

## 2024-05-08 VITALS
DIASTOLIC BLOOD PRESSURE: 100 MMHG | HEART RATE: 96 BPM | WEIGHT: 219 LBS | HEIGHT: 66 IN | SYSTOLIC BLOOD PRESSURE: 168 MMHG | BODY MASS INDEX: 35.2 KG/M2

## 2024-05-08 DIAGNOSIS — I10 PRIMARY HYPERTENSION: Primary | ICD-10-CM

## 2024-05-08 DIAGNOSIS — R00.2 PALPITATIONS: ICD-10-CM

## 2024-05-08 DIAGNOSIS — R53.82 CHRONIC FATIGUE: ICD-10-CM

## 2024-05-08 DIAGNOSIS — I47.10 SVT (SUPRAVENTRICULAR TACHYCARDIA) (HCC): ICD-10-CM

## 2024-05-08 DIAGNOSIS — E87.6 HYPOKALEMIA: ICD-10-CM

## 2024-05-08 PROCEDURE — 99214 OFFICE O/P EST MOD 30 MIN: CPT | Performed by: INTERNAL MEDICINE

## 2024-05-08 PROCEDURE — G8417 CALC BMI ABV UP PARAM F/U: HCPCS | Performed by: INTERNAL MEDICINE

## 2024-05-08 PROCEDURE — 1036F TOBACCO NON-USER: CPT | Performed by: INTERNAL MEDICINE

## 2024-05-08 PROCEDURE — 3077F SYST BP >= 140 MM HG: CPT | Performed by: INTERNAL MEDICINE

## 2024-05-08 PROCEDURE — G8428 CUR MEDS NOT DOCUMENT: HCPCS | Performed by: INTERNAL MEDICINE

## 2024-05-08 PROCEDURE — 3080F DIAST BP >= 90 MM HG: CPT | Performed by: INTERNAL MEDICINE

## 2024-05-08 RX ORDER — HYDROCHLOROTHIAZIDE 12.5 MG/1
12.5 CAPSULE, GELATIN COATED ORAL EVERY MORNING
Qty: 90 CAPSULE | Refills: 1 | Status: SHIPPED | OUTPATIENT
Start: 2024-05-08

## 2024-05-08 RX ORDER — METOPROLOL SUCCINATE 25 MG/1
25 TABLET, EXTENDED RELEASE ORAL DAILY
Qty: 90 TABLET | Refills: 3 | Status: SHIPPED | OUTPATIENT
Start: 2024-05-08

## 2024-05-08 NOTE — PROGRESS NOTES
[address] [facility], [place] [address]  PHONE: [phone]     24    NAME:  Sri Guy  : 1985  MRN: [id number]       SUBJECTIVE:   Sri Guy is a 38 y.o. female seen for a follow up visit regarding the following:     Chief Complaint   Patient presents with    Irregular Heart Beat    Hypertension       HPI: Here for HTN and SVT.    Here for SVT hx, ablation in .    Prior PE in  after child birth.    Echo/SE 2019: normal EF, normal SE.    (She underwent in 2016 an EP study with SVT mapping and ablation with Dr. Daugherty. 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   Echo at that time showed normal LVEF. Sent for sleep study which ultimately was normal.  EPS  with no inducible SVT.   Holter 2018 at [facility]: · Sinus rhythm with no ectopy recorded, reported symptoms do not have a clear heart rate correlate).   CT 2020:  No evidence of obstructive atherosclerotic coronary artery disease.      Echo 2021: normal EF.       Monitor 2022: sinus with flutter beats.  Avg HR 87bpm.    Monitor 10/2023: Avg HR 100bpm, sinus tach 160s with light activity, SVT 170s, rare PACs, PVCs, Sinus tach 42%  No AFIB     On aldactone 100 in the AM, norvasc 10 in PM.   Home SBP 150s.  Eating better now.  Avoiding salt, walking 30min daily and 30 in gym work 3 x per week. NO angina exercising.  No angina exercising at all.      Patient denies recent history of orthopnea, PND, excessive dizziness and/or syncope.            Prior partial hysterectomy.    Hives on ARB  Tired on Hydralazine.   Swelling in mouth on Ace          Past Medical History, Past Surgical History, Family history, Social History, and Medications were all reviewed with the patient today and updated as necessary.     Current Outpatient Medications   Medication Sig Dispense Refill    hydroCHLOROthiazide 12.5 MG capsule Take 1 capsule by mouth every morning 90 capsule 1    metoprolol

## 2024-05-13 ENCOUNTER — TELEPHONE (OUTPATIENT)
Age: 39
End: 2024-05-13

## 2024-05-13 DIAGNOSIS — R40.0 DAYTIME SOMNOLENCE: ICD-10-CM

## 2024-05-13 DIAGNOSIS — R06.83 SNORING: ICD-10-CM

## 2024-05-13 DIAGNOSIS — I10 HYPERTENSION: Primary | ICD-10-CM

## 2024-05-13 NOTE — TELEPHONE ENCOUNTER
I called and informed pt.of MD response and she v/u.I advised she call Friday am w/an update and she v/u.

## 2024-05-13 NOTE — TELEPHONE ENCOUNTER
Thanks!!  Have her increase the metoprolol to 25 BID, can even increase to 50 BID if needed for higher BP.    Have her call us late this week for BP report.   Thanks

## 2024-05-13 NOTE — TELEPHONE ENCOUNTER
----- Message from Gregorio Horton, DO sent at 5/13/2024  2:39 PM EDT -----  Please call her, see how her BP is going on meds, better?  I am concerned about Primary Aldosteronism in her with low K.    Need aldosterone/renin ratio lab please  Also would like her to get a sleep study if never had one.   Please order these.  And call her explaining the plan for more HTN work-up given her uncontrolled HTN.   Thanks

## 2024-05-13 NOTE — TELEPHONE ENCOUNTER
I spoke w/pt.she said she started the medication HCTZ 12.5mg  last week and has been having huge headaches that are described as sharp headaches.Has not heard from Endocrinology or Nephrology.BP this am 148/100 HR 99 20min after meds.This is about her normal BP.She has never had a sleep study wants to go to the sleep lab and not do at home study.I placed order for [facility] to [facility] for [facility] and consult is test is positive for dx of snoring and daytime somnolence.I also placed order for labs as below.The referral were just made to Endocrinology mid week last week.May take a little bit longer to hear back.Note forwarded to   Orders Placed This Encounter   Procedures    Aldosterone & Renin, Direct with Ratio     Standing Status:   Future     Standing Expiration Date:   5/13/2025    [facility]     Referral Priority:   Routine     Referral Type:   Eval and Treat     Referral Reason:   Specialty Services Required     Number of Visits Requested:   1

## 2024-05-14 DIAGNOSIS — I10 HYPERTENSION: ICD-10-CM

## 2024-05-14 DIAGNOSIS — I10 PRIMARY HYPERTENSION: ICD-10-CM

## 2024-05-14 LAB
ANION GAP SERPL CALC-SCNC: 10 MMOL/L (ref 9–18)
BUN SERPL-MCNC: 10 MG/DL (ref 6–23)
CALCIUM SERPL-MCNC: 9 MG/DL (ref 8.8–10.2)
CHLORIDE SERPL-SCNC: 105 MMOL/L (ref 98–107)
CO2 SERPL-SCNC: 24 MMOL/L (ref 20–28)
CREAT SERPL-MCNC: 0.88 MG/DL (ref 0.6–1.1)
GLUCOSE SERPL-MCNC: 84 MG/DL (ref 70–99)
POTASSIUM SERPL-SCNC: 3.7 MMOL/L (ref 3.5–5.1)
SODIUM SERPL-SCNC: 138 MMOL/L (ref 136–145)

## 2024-05-19 LAB
ALDOST SERPL-MCNC: 21.7 NG/DL (ref 0–30)
ALDOST/RENIN PLAS-RTO: 29.4 (ref 0–30)
RENIN PLAS-CCNC: 0.74 NG/ML/HR (ref 0.17–5.38)

## 2024-05-21 ENCOUNTER — TRANSCRIBE ORDERS (OUTPATIENT)
Dept: SCHEDULING | Age: 39
End: 2024-05-21

## 2024-05-21 ENCOUNTER — TELEPHONE (OUTPATIENT)
Age: 39
End: 2024-05-21

## 2024-05-21 DIAGNOSIS — M47.817 SPONDYLOSIS OF LUMBOSACRAL JOINT WITHOUT MYELOPATHY: Primary | ICD-10-CM

## 2024-05-21 NOTE — TELEPHONE ENCOUNTER
Pt calling as she has not received a phone call from the renal doctor. Dr Horton said to call if she did not hear back so she is calling.

## 2024-05-22 NOTE — TELEPHONE ENCOUNTER
Please call her, labs are concerning for primary aldosteronism.  Please refer to endocrine for eval and mgmt after recent labs.   Thanks

## 2024-05-23 NOTE — TELEPHONE ENCOUNTER
Called Endo advised what patient is being referred for per Dr. Horton and updated referral. States once updated will call pt to set up appt.

## 2024-05-30 NOTE — TELEPHONE ENCOUNTER
Patient has called back and has not heard from endocrinologist There is a note in the referral stating when they are not scheduling her Please call

## 2024-05-30 NOTE — TELEPHONE ENCOUNTER
Called pt advised reached out to endo office and should hopefully have someone reaching out to her was given a verbal understanding.

## 2024-06-05 ENCOUNTER — HOSPITAL ENCOUNTER (OUTPATIENT)
Dept: SLEEP MEDICINE | Age: 39
Discharge: HOME | End: 2024-06-08
Payer: COMMERCIAL

## 2024-06-05 PROCEDURE — 95810 POLYSOM 6/> YRS 4/> PARAM: CPT

## 2024-06-13 ENCOUNTER — OFFICE VISIT (OUTPATIENT)
Dept: FAMILY MEDICINE CLINIC | Facility: CLINIC | Age: 39
End: 2024-06-13
Payer: COMMERCIAL

## 2024-06-13 VITALS
DIASTOLIC BLOOD PRESSURE: 100 MMHG | OXYGEN SATURATION: 98 % | SYSTOLIC BLOOD PRESSURE: 130 MMHG | WEIGHT: 213 LBS | HEIGHT: 66 IN | HEART RATE: 88 BPM | BODY MASS INDEX: 34.23 KG/M2

## 2024-06-13 DIAGNOSIS — Z53.29 LEFT AGAINST MEDICAL ADVICE: ICD-10-CM

## 2024-06-13 DIAGNOSIS — G44.59 OTHER COMPLICATED HEADACHE SYNDROME: ICD-10-CM

## 2024-06-13 DIAGNOSIS — E26.9 HYPERALDOSTERONISM (HCC): ICD-10-CM

## 2024-06-13 DIAGNOSIS — I16.9 HYPERTENSIVE CRISIS: Primary | ICD-10-CM

## 2024-06-13 DIAGNOSIS — I10 ESSENTIAL HYPERTENSION: ICD-10-CM

## 2024-06-13 PROCEDURE — G8427 DOCREV CUR MEDS BY ELIG CLIN: HCPCS | Performed by: FAMILY MEDICINE

## 2024-06-13 PROCEDURE — 3080F DIAST BP >= 90 MM HG: CPT | Performed by: FAMILY MEDICINE

## 2024-06-13 PROCEDURE — G8417 CALC BMI ABV UP PARAM F/U: HCPCS | Performed by: FAMILY MEDICINE

## 2024-06-13 PROCEDURE — 1036F TOBACCO NON-USER: CPT | Performed by: FAMILY MEDICINE

## 2024-06-13 PROCEDURE — 3075F SYST BP GE 130 - 139MM HG: CPT | Performed by: FAMILY MEDICINE

## 2024-06-13 PROCEDURE — 99215 OFFICE O/P EST HI 40 MIN: CPT | Performed by: FAMILY MEDICINE

## 2024-06-13 RX ORDER — DICLOFENAC SODIUM 30 MG/G
GEL TOPICAL
COMMUNITY
Start: 2024-05-30

## 2024-06-13 ASSESSMENT — ENCOUNTER SYMPTOMS
SINUS PAIN: 0
DIARRHEA: 0
RHINORRHEA: 0
SHORTNESS OF BREATH: 0
ABDOMINAL PAIN: 0
COUGH: 0

## 2024-06-13 NOTE — PROGRESS NOTES
Patient states she is not going to take the blood pressure medication that dr. Collins suggested. Patient states she is leaving the office and to let dr. Collins know that.   
Refer: Dr Jordan Knight referral faxed. Office will contact patient with appt.
DIAGNOSIS    Diclofenac Sodium 3 % GEL [[id number]]                  Diagnosis Orders   1. Hypertensive crisis        2. Hyperaldosteronism (HCC)        3. Essential hypertension        4. Other complicated headache syndrome        5. Left against medical advice        , Sri was seen today for blood pressure check and headache.    Diagnoses and all orders for this visit:    Hypertensive crisis    Hyperaldosteronism (HCC)    Essential hypertension    Other complicated headache syndrome    Left against medical advice    , After long lengthy discussion about her medical condition and believing that her blood pressure is related to her headaches and call her cardiologist to discuss putting her on another medication to her present medication and after discussing clonidine which would be a good agent alpha-blocker central acting went back into the room to discuss this with the patient she got upset wondering all the side effects about her medicine does not want to take medicine states that she is on too many medicines to begin with proceeded to tell her that until we figure out what is going on we need to control her blood pressure before she has a heart attack stroke etc. patient wanted to know all the side effects walked out of the room to let her calm down had my staff print out the handout on medication and told her that I would come back after she opened to let us know that she is doing reading about all the side effects.  Patient spent another 20 minutes reading about it finally opened up the door and walked out.  Risk benefits options were discussed prior to her walking out patient does not want to be on the medication my charted her cardiologist to let her know patient is aware of risks.More than 50% of this 40 minute visit was spent counseling the patient about multiple complaints.

## 2024-07-11 ENCOUNTER — OFFICE VISIT (OUTPATIENT)
Dept: ENT CLINIC | Age: 39
End: 2024-07-11
Payer: COMMERCIAL

## 2024-07-11 VITALS
BODY MASS INDEX: 31.67 KG/M2 | HEIGHT: 69 IN | WEIGHT: 213.8 LBS | DIASTOLIC BLOOD PRESSURE: 88 MMHG | SYSTOLIC BLOOD PRESSURE: 128 MMHG

## 2024-07-11 DIAGNOSIS — J34.3 HYPERTROPHY OF INFERIOR NASAL TURBINATE: Chronic | ICD-10-CM

## 2024-07-11 DIAGNOSIS — J01.00 ACUTE NON-RECURRENT MAXILLARY SINUSITIS: Primary | ICD-10-CM

## 2024-07-11 DIAGNOSIS — Z45.89 TYMPANOSTOMY TUBE CHECK: Chronic | ICD-10-CM

## 2024-07-11 PROCEDURE — 1036F TOBACCO NON-USER: CPT | Performed by: PHYSICIAN ASSISTANT

## 2024-07-11 PROCEDURE — G8427 DOCREV CUR MEDS BY ELIG CLIN: HCPCS | Performed by: PHYSICIAN ASSISTANT

## 2024-07-11 PROCEDURE — 99214 OFFICE O/P EST MOD 30 MIN: CPT | Performed by: PHYSICIAN ASSISTANT

## 2024-07-11 PROCEDURE — 3079F DIAST BP 80-89 MM HG: CPT | Performed by: PHYSICIAN ASSISTANT

## 2024-07-11 PROCEDURE — G8417 CALC BMI ABV UP PARAM F/U: HCPCS | Performed by: PHYSICIAN ASSISTANT

## 2024-07-11 PROCEDURE — 3074F SYST BP LT 130 MM HG: CPT | Performed by: PHYSICIAN ASSISTANT

## 2024-07-11 RX ORDER — OMEPRAZOLE 40 MG/1
28 CAPSULE, DELAYED RELEASE ORAL
COMMUNITY
Start: 2023-08-03 | End: 2024-07-11

## 2024-07-11 RX ORDER — FLUCONAZOLE 150 MG/1
150 TABLET ORAL ONCE
Qty: 1 TABLET | Refills: 1 | Status: SHIPPED | OUTPATIENT
Start: 2024-07-11 | End: 2024-07-11

## 2024-07-11 RX ORDER — AZITHROMYCIN 250 MG/1
TABLET, FILM COATED ORAL
Qty: 6 TABLET | Refills: 0 | Status: SHIPPED | OUTPATIENT
Start: 2024-07-11 | End: 2024-07-21

## 2024-07-11 RX ORDER — METRONIDAZOLE 500 MG/1
TABLET ORAL
COMMUNITY
Start: 2024-06-17

## 2024-07-11 RX ORDER — NAPROXEN 500 MG/1
TABLET ORAL
COMMUNITY

## 2024-07-11 RX ORDER — FAMOTIDINE 40 MG/1
40 TABLET, FILM COATED ORAL 2 TIMES DAILY
COMMUNITY
Start: 2024-06-17 | End: 2024-07-11

## 2024-07-11 RX ORDER — METHYLPREDNISOLONE 4 MG/1
TABLET ORAL
Qty: 21 TABLET | Refills: 0 | Status: SHIPPED | OUTPATIENT
Start: 2024-07-11 | End: 2024-07-17

## 2024-07-11 RX ORDER — LEVOFLOXACIN 500 MG/1
14 TABLET, FILM COATED ORAL
COMMUNITY
Start: 2023-08-03

## 2024-07-11 RX ORDER — SPIRONOLACTONE 50 MG/1
TABLET, FILM COATED ORAL
COMMUNITY

## 2024-07-11 ASSESSMENT — ENCOUNTER SYMPTOMS
RESPIRATORY NEGATIVE: 1
ALLERGIC/IMMUNOLOGIC NEGATIVE: 1
EYES NEGATIVE: 1
GASTROINTESTINAL NEGATIVE: 1

## 2024-07-11 NOTE — PROGRESS NOTES
Endocrine: Negative.    Genitourinary: Negative.    Musculoskeletal: Negative.    Skin: Negative.    Allergic/Immunologic: Negative.    Neurological: Negative.    Hematological: Negative.    Psychiatric/Behavioral: Negative.          /88 (Site: Right Upper Arm, Position: Sitting)   Ht 1.753 m (5' 9\")   Wt 97 kg (213 lb 12.8 oz)   BMI 31.57 kg/m²     Physical Exam:    General: Well developed, well nourished, in no acute distress  Communication: The patient communicates appropriately for their age.  Voice: Normal.  Head, Face, and Salivary Glands: No head or facial abnormalities present, No masses or lesions present, Overall appearance is normal, No abnormality of parotid or submandibular glands present.    External Ears: appearance is normal with no scars, lesions or masses.   Right Ear:  Canals is normal, Tympanic membrane with normal landmarks and normal mobility, no retraction, inflammation, effusion.  Left  Ear: Canal is normal, Tympanic membrane with normal landmarks and normal mobility, no retraction, inflammation, effusion. PE tube extruded from TM, but adhered to cerumen in the proximal canal.     Nose/Nasal Cavity: Nasal mucosa is red/ inflamed.  Nasal septum is midline.  Inferior turbinates are Hypertrophic.  Both nasal passages are congested, no polyps but yellow abnormal drainage.    Lips/Gums/Teeth: Inspection of lips, gums and teeth are normal  Oral Cavity: normal oral mucosa, no visualized ulcers, masses or other lesions,  Palate normal, Tongue normal, Floor of mouth normal. Mallampati Class 1 .  Oropharynx: Oropharynx normal and unobstructed, tonsils are surgically absent   , no lesion or inflammation.     Neck/Thyroid: Normal appearance without mass, Trachea midline, No lymphadenopathy, No enlargement, tenderness or mass of thyroid noted.      Procedure: N/A    Assessment/Plan:  1. Acute non-recurrent maxillary sinusitis  2. Tympanostomy tube check  3. Hypertrophy of inferior nasal

## 2024-07-15 ENCOUNTER — TELEPHONE (OUTPATIENT)
Dept: SLEEP MEDICINE | Age: 39
End: 2024-07-15

## 2024-07-16 ENCOUNTER — APPOINTMENT (RX ONLY)
Dept: URBAN - METROPOLITAN AREA CLINIC 329 | Facility: CLINIC | Age: 39
Setting detail: DERMATOLOGY
End: 2024-07-16

## 2024-07-16 DIAGNOSIS — L30.4 ERYTHEMA INTERTRIGO: ICD-10-CM | Status: INADEQUATELY CONTROLLED

## 2024-07-16 PROCEDURE — 99213 OFFICE O/P EST LOW 20 MIN: CPT

## 2024-07-16 PROCEDURE — ? FULL BODY SKIN EXAM - DECLINED

## 2024-07-16 PROCEDURE — ? MEDICATION COUNSELING

## 2024-07-16 PROCEDURE — ? PRESCRIPTION MEDICATION MANAGEMENT

## 2024-07-16 PROCEDURE — ? PRESCRIPTION

## 2024-07-16 PROCEDURE — ? COUNSELING

## 2024-07-16 RX ORDER — KETOCONAZOLE 20 MG/G
CREAM TOPICAL
Qty: 60 | Refills: 3 | Status: ERX | COMMUNITY
Start: 2024-07-16

## 2024-07-16 RX ORDER — NYSTATIN CREAM 100000 [USP'U]/G
CREAM TOPICAL
Qty: 30 | Refills: 3 | Status: ERX | COMMUNITY
Start: 2024-07-16

## 2024-07-16 RX ADMIN — NYSTATIN CREAM: 100000 CREAM TOPICAL at 00:00

## 2024-07-16 RX ADMIN — KETOCONAZOLE: 20 CREAM TOPICAL at 00:00

## 2024-07-16 ASSESSMENT — LOCATION DETAILED DESCRIPTION DERM
LOCATION DETAILED: PERIUMBILICAL SKIN
LOCATION DETAILED: EPIGASTRIC SKIN
LOCATION DETAILED: RIGHT LATERAL ABDOMEN

## 2024-07-16 ASSESSMENT — LOCATION ZONE DERM: LOCATION ZONE: TRUNK

## 2024-07-16 ASSESSMENT — LOCATION SIMPLE DESCRIPTION DERM: LOCATION SIMPLE: ABDOMEN

## 2024-07-16 NOTE — PROCEDURE: PRESCRIPTION MEDICATION MANAGEMENT
Render In Strict Bullet Format?: No
Initiate Treatment: ketoconazole 2 % topical cream. Apply to affected areas in groin bid x 2 wks then prn for flares\\n\\nnystatin 100,000 unit/gram topical cream : Apply to skin folds bid for 2 weeks then prn for flares\\n\\nPowder and thick creams
Detail Level: Zone

## 2024-07-16 NOTE — HPI: RASH
How Severe Is Your Rash?: mild
Is This A New Presentation, Or A Follow-Up?: Rash
Additional History: Pt states she had an itchy rash on her stomach of concern.

## 2024-07-16 NOTE — PROCEDURE: MEDICATION COUNSELING
Isotretinoin Pregnancy And Lactation Text: This medication is Pregnancy Category X and is considered extremely dangerous during pregnancy. It is unknown if it is excreted in breast milk.
Niacinamide Counseling: I recommended taking niacin or niacinamide, also know as vitamin B3, twice daily. Recent evidence suggests that taking vitamin B3 (500 mg twice daily) can reduce the risk of actinic keratoses and non-melanoma skin cancers. Side effects of vitamin B3 include flushing and headache.
Dapsone Pregnancy And Lactation Text: This medication is Pregnancy Category C and is not considered safe during pregnancy or breast feeding.
Terbinafine Counseling: Patient counseling regarding adverse effects of terbinafine including but not limited to headache, diarrhea, rash, upset stomach, liver function test abnormalities, itching, taste/smell disturbance, nausea, abdominal pain, and flatulence.  There is a rare possibility of liver failure that can occur when taking terbinafine.  The patient understands that a baseline LFT and kidney function test may be required. The patient verbalized understanding of the proper use and possible adverse effects of terbinafine.  All of the patient's questions and concerns were addressed.
Topical Retinoid counseling:  Patient advised to apply a pea-sized amount only at bedtime and wait 30 minutes after washing their face before applying.  If too drying, patient may add a non-comedogenic moisturizer. The patient verbalized understanding of the proper use and possible adverse effects of retinoids.  All of the patient's questions and concerns were addressed.
Siliq Pregnancy And Lactation Text: The risk during pregnancy and breastfeeding is uncertain with this medication.
Benzoyl Peroxide Pregnancy And Lactation Text: This medication is Pregnancy Category C. It is unknown if benzoyl peroxide is excreted in breast milk.
Tremfya Counseling: I discussed with the patient the risks of guselkumab including but not limited to immunosuppression, serious infections, and drug reactions.  The patient understands that monitoring is required including a PPD at baseline and must alert us or the primary physician if symptoms of infection or other concerning signs are noted.
Hyrimoz Counseling:  I discussed with the patient the risks of adalimumab including but not limited to myelosuppression, immunosuppression, autoimmune hepatitis, demyelinating diseases, lymphoma, and serious infections.  The patient understands that monitoring is required including a PPD at baseline and must alert us or the primary physician if symptoms of infection or other concerning signs are noted.
Detail Level: Simple
Thalidomide Counseling: I discussed with the patient the risks of thalidomide including but not limited to birth defects, anxiety, weakness, chest pain, dizziness, cough and severe allergy.
Elidel Counseling: Patient may experience a mild burning sensation during topical application. Elidel is not approved in children less than 2 years of age. There have been case reports of hematologic and skin malignancies in patients using topical calcineurin inhibitors although causality is questionable.
Klisyri Pregnancy And Lactation Text: It is unknown if this medication can harm a developing fetus or if it is excreted in breast milk.
Hyrimoz Pregnancy And Lactation Text: This medication is Pregnancy Category B and is considered safe during pregnancy. It is unknown if this medication is excreted in breast milk.
Winlevi Counseling:  I discussed with the patient the risks of topical clascoterone including but not limited to erythema, scaling, itching, and stinging. Patient voiced their understanding.
Include Pregnancy/Lactation Warning?: No
Odomzo Pregnancy And Lactation Text: This medication is Pregnancy Category X and is absolutely contraindicated during pregnancy. It is unknown if it is excreted in breast milk.
Cibinqo Pregnancy And Lactation Text: It is unknown if this medication will adversely affect pregnancy or breast feeding.  You should not take this medication if you are currently pregnant or planning a pregnancy or while breastfeeding.
Cephalexin Pregnancy And Lactation Text: This medication is Pregnancy Category B and considered safe during pregnancy.  It is also excreted in breast milk but can be used safely for shorter doses.
Minocycline Counseling: Patient advised regarding possible photosensitivity and discoloration of the teeth, skin, lips, tongue and gums.  Patient instructed to avoid sunlight, if possible.  When exposed to sunlight, patients should wear protective clothing, sunglasses, and sunscreen.  The patient was instructed to call the office immediately if the following severe adverse effects occur:  hearing changes, easy bruising/bleeding, severe headache, or vision changes.  The patient verbalized understanding of the proper use and possible adverse effects of minocycline.  All of the patient's questions and concerns were addressed.
Tetracycline Pregnancy And Lactation Text: This medication is Pregnancy Category D and not consider safe during pregnancy. It is also excreted in breast milk.
Opioid Counseling: I discussed with the patient the potential side effects of opioids including but not limited to addiction, altered mental status, and depression. I stressed avoiding alcohol, benzodiazepines, muscle relaxants and sleep aids unless specifically okayed by a physician. The patient verbalized understanding of the proper use and possible adverse effects of opioids. All of the patient's questions and concerns were addressed. They were instructed to flush the remaining pills down the toilet if they did not need them for pain.
Protopic Counseling: Patient may experience a mild burning sensation during topical application. Protopic is not approved in children less than 2 years of age. There have been case reports of hematologic and skin malignancies in patients using topical calcineurin inhibitors although causality is questionable.
Topical Ketoconazole Pregnancy And Lactation Text: This medication is Pregnancy Category B and is considered safe during pregnancy. It is unknown if it is excreted in breast milk.
Birth Control Pills Pregnancy And Lactation Text: This medication should be avoided if pregnant and for the first 30 days post-partum.
Cosentyx Counseling:  I discussed with the patient the risks of Cosentyx including but not limited to worsening of Crohn's disease, immunosuppression, allergic reactions and infections.  The patient understands that monitoring is required including a PPD at baseline and must alert us or the primary physician if symptoms of infection or other concerning signs are noted.
Xeljanz Counseling: I discussed with the patient the risks of Xeljanz therapy including increased risk of infection, liver issues, headache, diarrhea, or cold symptoms. Live vaccines should be avoided. They were instructed to call if they have any problems.
High Dose Vitamin A Counseling: Side effects reviewed, pt to contact office should one occur.
Low Dose Naltrexone Pregnancy And Lactation Text: Naltrexone is pregnancy category C.  There have been no adequate and well-controlled studies in pregnant women.  It should be used in pregnancy only if the potential benefit justifies the potential risk to the fetus.   Limited data indicates that naltrexone is minimally excreted into breastmilk.
Dapsone Counseling: I discussed with the patient the risks of dapsone including but not limited to hemolytic anemia, agranulocytosis, rashes, methemoglobinemia, kidney failure, peripheral neuropathy, headaches, GI upset, and liver toxicity.  Patients who start dapsone require monitoring including baseline LFTs and weekly CBCs for the first month, then every month thereafter.  The patient verbalized understanding of the proper use and possible adverse effects of dapsone.  All of the patient's questions and concerns were addressed.
Soolantra Pregnancy And Lactation Text: This medication is Pregnancy Category C. This medication is considered safe during breast feeding.
Cyclophosphamide Pregnancy And Lactation Text: This medication is Pregnancy Category D and it isn't considered safe during pregnancy. This medication is excreted in breast milk.
Oxybutynin Counseling:  I discussed with the patient the risks of oxybutynin including but not limited to skin rash, drowsiness, dry mouth, difficulty urinating, and blurred vision.
Carac Counseling:  I discussed with the patient the risks of Carac including but not limited to erythema, scaling, itching, weeping, crusting, and pain.
Minoxidil Counseling: Minoxidil is a topical medication which can increase blood flow where it is applied. It is uncertain how this medication increases hair growth. Side effects are uncommon and include stinging and allergic reactions.
Terbinafine Pregnancy And Lactation Text: This medication is Pregnancy Category B and is considered safe during pregnancy. It is also excreted in breast milk and breast feeding isn't recommended.
Wartpeel Pregnancy And Lactation Text: This medication is Pregnancy Category X and contraindicated in pregnancy and in women who may become pregnant. It is unknown if this medication is excreted in breast milk.
Litfulo Counseling: I discussed with the patient the risks of Litfulo therapy including but not limited to upper respiratory tract infections, shingles, cold sores, and nausea. Live vaccines should be avoided.  This medication has been linked to serious infections; higher rate of mortality; malignancy and lymphoproliferative disorders; major adverse cardiovascular events; thrombosis; gastrointestinal perforations; neutropenia; lymphopenia; anemia; liver enzyme elevations; and lipid elevations.
Ivermectin Counseling:  Patient instructed to take medication on an empty stomach with a full glass of water.  Patient informed of potential adverse effects including but not limited to nausea, diarrhea, dizziness, itching, and swelling of the extremities or lymph nodes.  The patient verbalized understanding of the proper use and possible adverse effects of ivermectin.  All of the patient's questions and concerns were addressed.
Simponi Counseling:  I discussed with the patient the risks of golimumab including but not limited to myelosuppression, immunosuppression, autoimmune hepatitis, demyelinating diseases, lymphoma, and serious infections.  The patient understands that monitoring is required including a PPD at baseline and must alert us or the primary physician if symptoms of infection or other concerning signs are noted.
Clindamycin Pregnancy And Lactation Text: This medication can be used in pregnancy if certain situations. Clindamycin is also present in breast milk.
Elidel Pregnancy And Lactation Text: This medication is Pregnancy Category C. It is unknown if this medication is excreted in breast milk.
Albendazole Counseling:  I discussed with the patient the risks of albendazole including but not limited to cytopenia, kidney damage, nausea/vomiting and severe allergy.  The patient understands that this medication is being used in an off-label manner.
Protopic Pregnancy And Lactation Text: This medication is Pregnancy Category C. It is unknown if this medication is excreted in breast milk when applied topically.
Ivermectin Pregnancy And Lactation Text: This medication is Pregnancy Category C and it isn't known if it is safe during pregnancy. It is also excreted in breast milk.
Clindamycin Counseling: I counseled the patient regarding use of clindamycin as an antibiotic for prophylactic and/or therapeutic purposes. Clindamycin is active against numerous classes of bacteria, including skin bacteria. Side effects may include nausea, diarrhea, gastrointestinal upset, rash, hives, yeast infections, and in rare cases, colitis.
Xelmalloriez Pregnancy And Lactation Text: This medication is Pregnancy Category D and is not considered safe during pregnancy.  The risk during breast feeding is also uncertain.
Low Dose Naltrexone Counseling- I discussed with the patient the potential risks and side effects of low dose naltrexone including but not limited to: more vivid dreams, headaches, nausea, vomiting, abdominal pain, fatigue, dizziness, and anxiety.
Ilumya Counseling: I discussed with the patient the risks of tildrakizumab including but not limited to immunosuppression, malignancy, posterior leukoencephalopathy syndrome, and serious infections.  The patient understands that monitoring is required including a PPD at baseline and must alert us or the primary physician if symptoms of infection or other concerning signs are noted.
Spironolactone Counseling: Patient advised regarding risks of diarrhea, abdominal pain, hyperkalemia, birth defects (for female patients), liver toxicity and renal toxicity. The patient may need blood work to monitor liver and kidney function and potassium levels while on therapy. The patient verbalized understanding of the proper use and possible adverse effects of spironolactone.  All of the patient's questions and concerns were addressed.
Topical Ketoconazole Counseling: Patient counseled that this medication may cause skin irritation or allergic reactions.  In the event of skin irritation, the patient was advised to reduce the amount of the drug applied or use it less frequently.   The patient verbalized understanding of the proper use and possible adverse effects of ketoconazole.  All of the patient's questions and concerns were addressed.
High Dose Vitamin A Pregnancy And Lactation Text: High dose vitamin A therapy is contraindicated during pregnancy and breast feeding.
Fluconazole Counseling:  Patient counseled regarding adverse effects of fluconazole including but not limited to headache, diarrhea, nausea, upset stomach, liver function test abnormalities, taste disturbance, and stomach pain.  There is a rare possibility of liver failure that can occur when taking fluconazole.  The patient understands that monitoring of LFTs and kidney function test may be required, especially at baseline. The patient verbalized understanding of the proper use and possible adverse effects of fluconazole.  All of the patient's questions and concerns were addressed.
Cyclosporine Counseling:  I discussed with the patient the risks of cyclosporine including but not limited to hypertension, gingival hyperplasia,myelosuppression, immunosuppression, liver damage, kidney damage, neurotoxicity, lymphoma, and serious infections. The patient understands that monitoring is required including baseline blood pressure, CBC, CMP, lipid panel and uric acid, and then 1-2 times monthly CMP and blood pressure.
Colchicine Pregnancy And Lactation Text: This medication is Pregnancy Category C and isn't considered safe during pregnancy. It is excreted in breast milk.
Soolantra Counseling: I discussed with the patients the risks of topial Soolantra. This is a medicine which decreases the number of mites and inflammation in the skin. You experience burning, stinging, eye irritation or allergic reactions.  Please call our office if you develop any problems from using this medication.
Tranexamic Acid Counseling:  Patient advised of the small risk of bleeding problems with tranexamic acid. They were also instructed to call if they developed any nausea, vomiting or diarrhea. All of the patient's questions and concerns were addressed.
Doxycycline Counseling:  Patient counseled regarding possible photosensitivity and increased risk for sunburn.  Patient instructed to avoid sunlight, if possible.  When exposed to sunlight, patients should wear protective clothing, sunglasses, and sunscreen.  The patient was instructed to call the office immediately if the following severe adverse effects occur:  hearing changes, easy bruising/bleeding, severe headache, or vision changes.  The patient verbalized understanding of the proper use and possible adverse effects of doxycycline.  All of the patient's questions and concerns were addressed.
Eucrisa Counseling: Patient may experience a mild burning sensation during topical application. Eucrisa is not approved in children less than 3 months of age.
Quinolones Pregnancy And Lactation Text: This medication is Pregnancy Category C and it isn't know if it is safe during pregnancy. It is also excreted in breast milk.
Xolair Counseling:  Patient informed of potential adverse effects including but not limited to fever, muscle aches, rash and allergic reactions.  The patient verbalized understanding of the proper use and possible adverse effects of Xolair.  All of the patient's questions and concerns were addressed.
Dutasteride Male Counseling: Dustasteride Counseling:  I discussed with the patient the risks of use of dutasteride including but not limited to decreased libido, decreased ejaculate volume, and gynecomastia. Women who can become pregnant should not handle medication.  All of the patient's questions and concerns were addressed.
Wartpeel Counseling:  I discussed with the patient the risks of Wartpeel including but not limited to erythema, scaling, itching, weeping, crusting, and pain.
Minoxidil Pregnancy And Lactation Text: This medication has not been assigned a Pregnancy Risk Category but animal studies failed to show danger with the topical medication. It is unknown if the medication is excreted in breast milk.
Cyclosporine Pregnancy And Lactation Text: This medication is Pregnancy Category C and it isn't know if it is safe during pregnancy. This medication is excreted in breast milk.
Litfulo Pregnancy And Lactation Text: Based on animal studies, Lifulo may cause embryo-fetal harm when administered to pregnant women.  The medication should not be used in pregnancy.  Breastfeeding is not recommended during treatment.
Quinolones Counseling:  I discussed with the patient the risks of fluoroquinolones including but not limited to GI upset, allergic reaction, drug rash, diarrhea, dizziness, photosensitivity, yeast infections, liver function test abnormalities, tendonitis/tendon rupture.
Qbrexza Counseling:  I discussed with the patient the risks of Qbrexza including but not limited to headache, mydriasis, blurred vision, dry eyes, nasal dryness, dry mouth, dry throat, dry skin, urinary hesitation, and constipation.  Local skin reactions including erythema, burning, stinging, and itching can also occur.
Xolair Pregnancy And Lactation Text: This medication is Pregnancy Category B and is considered safe during pregnancy. This medication is excreted in breast milk.
Propranolol Counseling:  I discussed with the patient the risks of propranolol including but not limited to low heart rate, low blood pressure, low blood sugar, restlessness and increased cold sensitivity. They should call the office if they experience any of these side effects.
Skyrizi Counseling: I discussed with the patient the risks of risankizumab-rzaa including but not limited to immunosuppression, and serious infections.  The patient understands that monitoring is required including a PPD at baseline and must alert us or the primary physician if symptoms of infection or other concerning signs are noted.
Hydroxychloroquine Pregnancy And Lactation Text: This medication has been shown to cause fetal harm but it isn't assigned a Pregnancy Risk Category. There are small amounts excreted in breast milk.
Colchicine Counseling:  Patient counseled regarding adverse effects including but not limited to stomach upset (nausea, vomiting, stomach pain, or diarrhea).  Patient instructed to limit alcohol consumption while taking this medication.  Colchicine may reduce blood counts especially with prolonged use.  The patient understands that monitoring of kidney function and blood counts may be required, especially at baseline. The patient verbalized understanding of the proper use and possible adverse effects of colchicine.  All of the patient's questions and concerns were addressed.
Solaraze Pregnancy And Lactation Text: This medication is Pregnancy Category B and is considered safe. There is some data to suggest avoiding during the third trimester. It is unknown if this medication is excreted in breast milk.
Dupixent Counseling: I discussed with the patient the risks of dupilumab including but not limited to eye infection and irritation, cold sores, injection site reactions, worsening of asthma, allergic reactions and increased risk of parasitic infection.  Live vaccines should be avoided while taking dupilumab. Dupilumab will also interact with certain medications such as warfarin and cyclosporine. The patient understands that monitoring is required and they must alert us or the primary physician if symptoms of infection or other concerning signs are noted.
Spironolactone Pregnancy And Lactation Text: This medication can cause feminization of the male fetus and should be avoided during pregnancy. The active metabolite is also found in breast milk.
Calcipotriene Counseling:  I discussed with the patient the risks of calcipotriene including but not limited to erythema, scaling, itching, and irritation.
Zyclara Counseling:  I discussed with the patient the risks of imiquimod including but not limited to erythema, scaling, itching, weeping, crusting, and pain.  Patient understands that the inflammatory response to imiquimod is variable from person to person and was educated regarded proper titration schedule.  If flu-like symptoms develop, patient knows to discontinue the medication and contact us.
Adbry Counseling: I discussed with the patient the risks of tralokinumab including but not limited to eye infection and irritation, cold sores, injection site reactions, worsening of asthma, allergic reactions and increased risk of parasitic infection.  Live vaccines should be avoided while taking tralokinumab. The patient understands that monitoring is required and they must alert us or the primary physician if symptoms of infection or other concerning signs are noted.
Tranexamic Acid Pregnancy And Lactation Text: It is unknown if this medication is safe during pregnancy or breast feeding.
Dupixent Pregnancy And Lactation Text: This medication likely crosses the placenta but the risk for the fetus is uncertain. This medication is excreted in breast milk.
Acitretin Counseling:  I discussed with the patient the risks of acitretin including but not limited to hair loss, dry lips/skin/eyes, liver damage, hyperlipidemia, depression/suicidal ideation, photosensitivity.  Serious rare side effects can include but are not limited to pancreatitis, pseudotumor cerebri, bony changes, clot formation/stroke/heart attack.  Patient understands that alcohol is contraindicated since it can result in liver toxicity and significantly prolong the elimination of the drug by many years.
Mirvaso Counseling: Mirvaso is a topical medication which can decrease superficial blood flow where applied. Side effects are uncommon and include stinging, redness and allergic reactions.
Topical Sulfur Applications Pregnancy And Lactation Text: This medication is considered safe during pregnancy and breast feeding secondary to limited systemic absorption.
Olumiant Counseling: I discussed with the patient the risks of Olumiant therapy including but not limited to upper respiratory tract infections, shingles, cold sores, and nausea. Live vaccines should be avoided.  This medication has been linked to serious infections; higher rate of mortality; malignancy and lymphoproliferative disorders; major adverse cardiovascular events; thrombosis; gastrointestinal perforations; neutropenia; lymphopenia; anemia; liver enzyme elevations; and lipid elevations.
Doxycycline Pregnancy And Lactation Text: This medication is Pregnancy Category D and not consider safe during pregnancy. It is also excreted in breast milk but is considered safe for shorter treatment courses.
Calcipotriene Pregnancy And Lactation Text: The use of this medication during pregnancy or lactation is not recommended as there is insufficient data.
Rifampin Counseling: I discussed with the patient the risks of rifampin including but not limited to liver damage, kidney damage, red-orange body fluids, nausea/vomiting and severe allergy.
Dutasteride Female Counseling: Dutasteride Counseling:  I discussed with the patient the risks of use of dutasteride including but not limited to decreased libido and sexual dysfunction. Explained the teratogenic nature of the medication and stressed the importance of not getting pregnant during treatment. All of the patient's questions and concerns were addressed.
Methotrexate Counseling:  Patient counseled regarding adverse effects of methotrexate including but not limited to nausea, vomiting, abnormalities in liver function tests. Patients may develop mouth sores, rash, diarrhea, and abnormalities in blood counts. The patient understands that monitoring is required including LFT's and blood counts.  There is a rare possibility of scarring of the liver and lung problems that can occur when taking methotrexate. Persistent nausea, loss of appetite, pale stools, dark urine, cough, and shortness of breath should be reported immediately. Patient advised to discontinue methotrexate treatment at least three months before attempting to become pregnant.  I discussed the need for folate supplements while taking methotrexate.  These supplements can decrease side effects during methotrexate treatment. The patient verbalized understanding of the proper use and possible adverse effects of methotrexate.  All of the patient's questions and concerns were addressed.
Topical Clindamycin Counseling: Patient counseled that this medication may cause skin irritation or allergic reactions.  In the event of skin irritation, the patient was advised to reduce the amount of the drug applied or use it less frequently.   The patient verbalized understanding of the proper use and possible adverse effects of clindamycin.  All of the patient's questions and concerns were addressed.
Qbrexza Pregnancy And Lactation Text: There is no available data on Qbrexza use in pregnant women.  There is no available data on Qbrexza use in lactation.
Dutasteride Pregnancy And Lactation Text: This medication is absolutely contraindicated in women, especially during pregnancy and breast feeding. Feminization of male fetuses is possible if taking while pregnant.
Azathioprine Counseling:  I discussed with the patient the risks of azathioprine including but not limited to myelosuppression, immunosuppression, hepatotoxicity, lymphoma, and infections.  The patient understands that monitoring is required including baseline LFTs, Creatinine, possible TPMP genotyping and weekly CBCs for the first month and then every 2 weeks thereafter.  The patient verbalized understanding of the proper use and possible adverse effects of azathioprine.  All of the patient's questions and concerns were addressed.
Hydroxychloroquine Counseling:  I discussed with the patient that a baseline ophthalmologic exam is needed at the start of therapy and every year thereafter while on therapy. A CBC may also be warranted for monitoring.  The side effects of this medication were discussed with the patient, including but not limited to agranulocytosis, aplastic anemia, seizures, rashes, retinopathy, and liver toxicity. Patient instructed to call the office should any adverse effect occur.  The patient verbalized understanding of the proper use and possible adverse effects of Plaquenil.  All the patient's questions and concerns were addressed.
Cimetidine Counseling:  I discussed with the patient the risks of Cimetidine including but not limited to gynecomastia, headache, diarrhea, nausea, drowsiness, arrhythmias, pancreatitis, skin rashes, psychosis, bone marrow suppression and kidney toxicity.
Solaraze Counseling:  I discussed with the patient the risks of Solaraze including but not limited to erythema, scaling, itching, weeping, crusting, and pain.
Propranolol Pregnancy And Lactation Text: This medication is Pregnancy Category C and it isn't known if it is safe during pregnancy. It is excreted in breast milk.
Aklief counseling:  Patient advised to apply a pea-sized amount only at bedtime and wait 30 minutes after washing their face before applying.  If too drying, patient may add a non-comedogenic moisturizer.  The most commonly reported side effects including irritation, redness, scaling, dryness, stinging, burning, itching, and increased risk of sunburn.  The patient verbalized understanding of the proper use and possible adverse effects of retinoids.  All of the patient's questions and concerns were addressed.
Griseofulvin Counseling:  I discussed with the patient the risks of griseofulvin including but not limited to photosensitivity, cytopenia, liver damage, nausea/vomiting and severe allergy.  The patient understands that this medication is best absorbed when taken with a fatty meal (e.g., ice cream or french fries).
Cantharidin Counseling:  I discussed with the patient the risks of Cantharidin including but not limited to pain, redness, burning, itching, and blistering.
Infliximab Counseling:  I discussed with the patient the risks of infliximab including but not limited to myelosuppression, immunosuppression, autoimmune hepatitis, demyelinating diseases, lymphoma, and serious infections.  The patient understands that monitoring is required including a PPD at baseline and must alert us or the primary physician if symptoms of infection or other concerning signs are noted.
Azithromycin Counseling:  I discussed with the patient the risks of azithromycin including but not limited to GI upset, allergic reaction, drug rash, diarrhea, and yeast infections.
Enbrel Counseling:  I discussed with the patient the risks of etanercept including but not limited to myelosuppression, immunosuppression, autoimmune hepatitis, demyelinating diseases, lymphoma, and infections.  The patient understands that monitoring is required including a PPD at baseline and must alert us or the primary physician if symptoms of infection or other concerning signs are noted.
Zoryve Pregnancy And Lactation Text: It is unknown if this medication can cause problems during pregnancy and breastfeeding.
Topical Sulfur Applications Counseling: Topical Sulfur Counseling: Patient counseled that this medication may cause skin irritation or allergic reactions.  In the event of skin irritation, the patient was advised to reduce the amount of the drug applied or use it less frequently.   The patient verbalized understanding of the proper use and possible adverse effects of topical sulfur application.  All of the patient's questions and concerns were addressed.
Olumiant Pregnancy And Lactation Text: Based on animal studies, Olumiant may cause embryo-fetal harm when administered to pregnant women.  The medication should not be used in pregnancy.  Breastfeeding is not recommended during treatment.
Azithromycin Pregnancy And Lactation Text: This medication is considered safe during pregnancy and is also secreted in breast milk.
Valtrex Counseling: I discussed with the patient the risks of valacyclovir including but not limited to kidney damage, nausea, vomiting and severe allergy.  The patient understands that if the infection seems to be worsening or is not improving, they are to call.
Erythromycin Counseling:  I discussed with the patient the risks of erythromycin including but not limited to GI upset, allergic reaction, drug rash, diarrhea, increase in liver enzymes, and yeast infections.
Acitretin Pregnancy And Lactation Text: This medication is Pregnancy Category X and should not be given to women who are pregnant or may become pregnant in the future. This medication is excreted in breast milk.
Rifampin Pregnancy And Lactation Text: This medication is Pregnancy Category C and it isn't know if it is safe during pregnancy. It is also excreted in breast milk and should not be used if you are breast feeding.
Mirvaso Pregnancy And Lactation Text: This medication has not been assigned a Pregnancy Risk Category. It is unknown if the medication is excreted in breast milk.
Hydroquinone Counseling:  Patient advised that medication may result in skin irritation, lightening (hypopigmentation), dryness, and burning.  In the event of skin irritation, the patient was advised to reduce the amount of the drug applied or use it less frequently.  Rarely, spots that are treated with hydroquinone can become darker (pseudoochronosis).  Should this occur, patient instructed to stop medication and call the office. The patient verbalized understanding of the proper use and possible adverse effects of hydroquinone.  All of the patient's questions and concerns were addressed.
Adbry Pregnancy And Lactation Text: It is unknown if this medication will adversely affect pregnancy or breast feeding.
Methotrexate Pregnancy And Lactation Text: This medication is Pregnancy Category X and is known to cause fetal harm. This medication is excreted in breast milk.
Erivedge Counseling- I discussed with the patient the risks of Erivedge including but not limited to nausea, vomiting, diarrhea, constipation, weight loss, changes in the sense of taste, decreased appetite, muscle spasms, and hair loss.  The patient verbalized understanding of the proper use and possible adverse effects of Erivedge.  All of the patient's questions and concerns were addressed.
Glycopyrrolate Pregnancy And Lactation Text: This medication is Pregnancy Category B and is considered safe during pregnancy. It is unknown if it is excreted breast milk.
Cantharidin Pregnancy And Lactation Text: This medication has not been proven safe during pregnancy. It is unknown if this medication is excreted in breast milk.
Doxepin Counseling:  Patient advised that the medication is sedating and not to drive a car after taking this medication. Patient informed of potential adverse effects including but not limited to dry mouth, urinary retention, and blurry vision.  The patient verbalized understanding of the proper use and possible adverse effects of doxepin.  All of the patient's questions and concerns were addressed.
Rhofade Counseling: Rhofade is a topical medication which can decrease superficial blood flow where applied. Side effects are uncommon and include stinging, redness and allergic reactions.
Olanzapine Pregnancy And Lactation Text: This medication is pregnancy category C.   There are no adequate and well controlled trials with olanzapine in pregnant females.  Olanzapine should be used during pregnancy only if the potential benefit justifies the potential risk to the fetus.   In a study in lactating healthy women, olanzapine was excreted in breast milk.  It is recommended that women taking olanzapine should not breast feed.
SSKI Counseling:  I discussed with the patient the risks of SSKI including but not limited to thyroid abnormalities, metallic taste, GI upset, fever, headache, acne, arthralgias, paraesthesias, lymphadenopathy, easy bleeding, arrhythmias, and allergic reaction.
Finasteride Male Counseling: Finasteride Counseling:  I discussed with the patient the risks of use of finasteride including but not limited to decreased libido, decreased ejaculate volume, gynecomastia, and depression. Women should not handle medication.  All of the patient's questions and concerns were addressed.
Aklief Pregnancy And Lactation Text: It is unknown if this medication is safe to use during pregnancy.  It is unknown if this medication is excreted in breast milk.  Breastfeeding women should use the topical cream on the smallest area of the skin for the shortest time needed while breastfeeding.  Do not apply to nipple and areola.
Tazorac Pregnancy And Lactation Text: This medication is not safe during pregnancy. It is unknown if this medication is excreted in breast milk.
Olanzapine Counseling- I discussed with the patient the common side effects of olanzapine including but are not limited to: lack of energy, dry mouth, increased appetite, sleepiness, tremor, constipation, dizziness, changes in behavior, or restlessness.  Explained that teenagers are more likely to experience headaches, abdominal pain, pain in the arms or legs, tiredness, and sleepiness.  Serious side effects include but are not limited: increased risk of death in elderly patients who are confused, have memory loss, or dementia-related psychosis; hyperglycemia; increased cholesterol and triglycerides; and weight gain.
Clofazimine Counseling:  I discussed with the patient the risks of clofazimine including but not limited to skin and eye pigmentation, liver damage, nausea/vomiting, gastrointestinal bleeding and allergy.
5-Fu Counseling: 5-Fluorouracil Counseling:  I discussed with the patient the risks of 5-fluorouracil including but not limited to erythema, scaling, itching, weeping, crusting, and pain.
Griseofulvin Pregnancy And Lactation Text: This medication is Pregnancy Category X and is known to cause serious birth defects. It is unknown if this medication is excreted in breast milk but breast feeding should be avoided.
Zoryve Counseling:  I discussed with the patient that Zoryve is not for use in the eyes, mouth or vagina. The most commonly reported side effects include diarrhea, headache, insomnia, application site pain, upper respiratory tract infections, and urinary tract infections.  All of the patient's questions and concerns were addressed.
Stelara Counseling:  I discussed with the patient the risks of ustekinumab including but not limited to immunosuppression, malignancy, posterior leukoencephalopathy syndrome, and serious infections.  The patient understands that monitoring is required including a PPD at baseline and must alert us or the primary physician if symptoms of infection or other concerning signs are noted.
Bexarotene Counseling:  I discussed with the patient the risks of bexarotene including but not limited to hair loss, dry lips/skin/eyes, liver abnormalities, hyperlipidemia, pancreatitis, depression/suicidal ideation, photosensitivity, drug rash/allergic reactions, hypothyroidism, anemia, leukopenia, infection, cataracts, and teratogenicity.  Patient understands that they will need regular blood tests to check lipid profile, liver function tests, white blood cell count, thyroid function tests and pregnancy test if applicable.
Sarecycline Counseling: Patient advised regarding possible photosensitivity and discoloration of the teeth, skin, lips, tongue and gums.  Patient instructed to avoid sunlight, if possible.  When exposed to sunlight, patients should wear protective clothing, sunglasses, and sunscreen.  The patient was instructed to call the office immediately if the following severe adverse effects occur:  hearing changes, easy bruising/bleeding, severe headache, or vision changes.  The patient verbalized understanding of the proper use and possible adverse effects of sarecycline.  All of the patient's questions and concerns were addressed.
Opzelura Counseling:  I discussed with the patient the risks of Opzelura including but not limited to nasopharngitis, bronchitis, ear infection, eosinophila, hives, diarrhea, folliculitis, tonsillitis, and rhinorrhea.  Taken orally, this medication has been linked to serious infections; higher rate of mortality; malignancy and lymphoproliferative disorders; major adverse cardiovascular events; thrombosis; thrombocytopenia, anemia, and neutropenia; and lipid elevations.
Topical Steroids Applications Pregnancy And Lactation Text: Most topical steroids are considered safe to use during pregnancy and lactation.  Any topical steroid applied to the breast or nipple should be washed off before breastfeeding.
Valtrex Pregnancy And Lactation Text: this medication is Pregnancy Category B and is considered safe during pregnancy. This medication is not directly found in breast milk but it's metabolite acyclovir is present.
Rinvoq Counseling: I discussed with the patient the risks of Rinvoq therapy including but not limited to upper respiratory tract infections, shingles, cold sores, bronchitis, nausea, cough, fever, acne, and headache. Live vaccines should be avoided.  This medication has been linked to serious infections; higher rate of mortality; malignancy and lymphoproliferative disorders; major adverse cardiovascular events; thrombosis; thrombocytopenia, anemia, and neutropenia; lipid elevations; liver enzyme elevations; and gastrointestinal perforations.
Oral Minoxidil Counseling- I discussed with the patient the risks of oral minoxidil including but not limited to shortness of breath, swelling of the feet or ankles, dizziness, lightheadedness, unwanted hair growth and allergic reaction.  The patient verbalized understanding of the proper use and possible adverse effects of oral minoxidil.  All of the patient's questions and concerns were addressed.
Doxepin Pregnancy And Lactation Text: This medication is Pregnancy Category C and it isn't known if it is safe during pregnancy. It is also excreted in breast milk and breast feeding isn't recommended.
Prednisone Counseling:  I discussed with the patient the risks of prolonged use of prednisone including but not limited to weight gain, insomnia, osteoporosis, mood changes, diabetes, susceptibility to infection, glaucoma and high blood pressure.  In cases where prednisone use is prolonged, patients should be monitored with blood pressure checks, serum glucose levels and an eye exam.  Additionally, the patient may need to be placed on GI prophylaxis, PCP prophylaxis, and calcium and vitamin D supplementation and/or a bisphosphonate.  The patient verbalized understanding of the proper use and the possible adverse effects of prednisone.  All of the patient's questions and concerns were addressed.
Tazorac Counseling:  Patient advised that medication is irritating and drying.  Patient may need to apply sparingly and wash off after an hour before eventually leaving it on overnight.  The patient verbalized understanding of the proper use and possible adverse effects of tazorac.  All of the patient's questions and concerns were addressed.
Nsaids Pregnancy And Lactation Text: These medications are considered safe up to 30 weeks gestation. It is excreted in breast milk.
Azelaic Acid Counseling: Patient counseled that medicine may cause skin irritation and to avoid applying near the eyes.  In the event of skin irritation, the patient was advised to reduce the amount of the drug applied or use it less frequently.   The patient verbalized understanding of the proper use and possible adverse effects of azelaic acid.  All of the patient's questions and concerns were addressed.
Glycopyrrolate Counseling:  I discussed with the patient the risks of glycopyrrolate including but not limited to skin rash, drowsiness, dry mouth, difficulty urinating, and blurred vision.
Azathioprine Pregnancy And Lactation Text: This medication is Pregnancy Category D and isn't considered safe during pregnancy. It is unknown if this medication is excreted in breast milk.
Bimzelx Counseling:  I discussed with the patient the risks of Bimzelx including but not limited to depression, immunosuppression, allergic reactions and infections.  The patient understands that monitoring is required including a PPD at baseline and must alert us or the primary physician if symptoms of infection or other concerning signs are noted.
Sski Pregnancy And Lactation Text: This medication is Pregnancy Category D and isn't considered safe during pregnancy. It is excreted in breast milk.
Itraconazole Counseling:  I discussed with the patient the risks of itraconazole including but not limited to liver damage, nausea/vomiting, neuropathy, and severe allergy.  The patient understands that this medication is best absorbed when taken with acidic beverages such as non-diet cola or ginger ale.  The patient understands that monitoring is required including baseline LFTs and repeat LFTs at intervals.  The patient understands that they are to contact us or the primary physician if concerning signs are noted.
Cellcept Counseling:  I discussed with the patient the risks of mycophenolate mofetil including but not limited to infection/immunosuppression, GI upset, hypokalemia, hypercholesterolemia, bone marrow suppression, lymphoproliferative disorders, malignancy, GI ulceration/bleed/perforation, colitis, interstitial lung disease, kidney failure, progressive multifocal leukoencephalopathy, and birth defects.  The patient understands that monitoring is required including a baseline creatinine and regular CBC testing. In addition, patient must alert us immediately if symptoms of infection or other concerning signs are noted.
Imiquimod Counseling:  I discussed with the patient the risks of imiquimod including but not limited to erythema, scaling, itching, weeping, crusting, and pain.  Patient understands that the inflammatory response to imiquimod is variable from person to person and was educated regarded proper titration schedule.  If flu-like symptoms develop, patient knows to discontinue the medication and contact us.
Opzelura Pregnancy And Lactation Text: There is insufficient data to evaluate drug-associated risk for major birth defects, miscarriage, or other adverse maternal or fetal outcomes.  There is a pregnancy registry that monitors pregnancy outcomes in pregnant persons exposed to the medication during pregnancy.  It is unknown if this medication is excreted in breast milk.  Do not breastfeed during treatment and for about 4 weeks after the last dose.
Topical Steroids Counseling: I discussed with the patient that prolonged use of topical steroids can result in the increased appearance of superficial blood vessels (telangiectasias), lightening (hypopigmentation) and thinning of the skin (atrophy).  Patient understands to avoid using high potency steroids in skin folds, the groin or the face.  The patient verbalized understanding of the proper use and possible adverse effects of topical steroids.  All of the patient's questions and concerns were addressed.
Bactrim Counseling:  I discussed with the patient the risks of sulfa antibiotics including but not limited to GI upset, allergic reaction, drug rash, diarrhea, dizziness, photosensitivity, and yeast infections.  Rarely, more serious reactions can occur including but not limited to aplastic anemia, agranulocytosis, methemoglobinemia, blood dyscrasias, liver or kidney failure, lung infiltrates or desquamative/blistering drug rashes.
Rituxan Counseling:  I discussed with the patient the risks of Rituxan infusions. Side effects can include infusion reactions, severe drug rashes including mucocutaneous reactions, reactivation of latent hepatitis and other infections and rarely progressive multifocal leukoencephalopathy.  All of the patient's questions and concerns were addressed.
Erythromycin Pregnancy And Lactation Text: This medication is Pregnancy Category B and is considered safe during pregnancy. It is also excreted in breast milk.
Bexarotene Pregnancy And Lactation Text: This medication is Pregnancy Category X and should not be given to women who are pregnant or may become pregnant. This medication should not be used if you are breast feeding.
Taltz Counseling: I discussed with the patient the risks of ixekizumab including but not limited to immunosuppression, serious infections, worsening of inflammatory bowel disease and drug reactions.  The patient understands that monitoring is required including a PPD at baseline and must alert us or the primary physician if symptoms of infection or other concerning signs are noted.
Oral Minoxidil Pregnancy And Lactation Text: This medication should only be used when clearly needed if you are pregnant, attempting to become pregnant or breast feeding.
Rituxan Pregnancy And Lactation Text: This medication is Pregnancy Category C and it isn't know if it is safe during pregnancy. It is unknown if this medication is excreted in breast milk but similar antibodies are known to be excreted.
Rinvoq Pregnancy And Lactation Text: Based on animal studies, Rinvoq may cause embryo-fetal harm when administered to pregnant women.  The medication should not be used in pregnancy.  Breastfeeding is not recommended during treatment and for 6 days after the last dose.
Nsaids Counseling: NSAID Counseling: I discussed with the patient that NSAIDs should be taken with food. Prolonged use of NSAIDs can result in the development of stomach ulcers.  Patient advised to stop taking NSAIDs if abdominal pain occurs.  The patient verbalized understanding of the proper use and possible adverse effects of NSAIDs.  All of the patient's questions and concerns were addressed.
Humira Counseling:  I discussed with the patient the risks of adalimumab including but not limited to myelosuppression, immunosuppression, autoimmune hepatitis, demyelinating diseases, lymphoma, and serious infections.  The patient understands that monitoring is required including a PPD at baseline and must alert us or the primary physician if symptoms of infection or other concerning signs are noted.
Hydroxyzine Counseling: Patient advised that the medication is sedating and not to drive a car after taking this medication.  Patient informed of potential adverse effects including but not limited to dry mouth, urinary retention, and blurry vision.  The patient verbalized understanding of the proper use and possible adverse effects of hydroxyzine.  All of the patient's questions and concerns were addressed.
Bimzelx Pregnancy And Lactation Text: This medication crosses the placenta and the safety is uncertain during pregnancy. It is unknown if this medication is present in breast milk.
Azelaic Acid Pregnancy And Lactation Text: This medication is considered safe during pregnancy and breast feeding.
Libtayo Counseling- I discussed with the patient the risks of Libtayo including but not limited to nausea, vomiting, diarrhea, and bone or muscle pain.  The patient verbalized understanding of the proper use and possible adverse effects of Libtayo.  All of the patient's questions and concerns were addressed.
Ketoconazole Counseling:   Patient counseled regarding improving absorption with orange juice.  Adverse effects include but are not limited to breast enlargement, headache, diarrhea, nausea, upset stomach, liver function test abnormalities, taste disturbance, and stomach pain.  There is a rare possibility of liver failure that can occur when taking ketoconazole. The patient understands that monitoring of LFTs may be required, especially at baseline. The patient verbalized understanding of the proper use and possible adverse effects of ketoconazole.  All of the patient's questions and concerns were addressed.
Finasteride Female Counseling: Finasteride Counseling:  I discussed with the patient the risks of use of finasteride including but not limited to decreased libido and sexual dysfunction. Explained the teratogenic nature of the medication and stressed the importance of not getting pregnant during treatment. All of the patient's questions and concerns were addressed.
Arava Counseling:  Patient counseled regarding adverse effects of Arava including but not limited to nausea, vomiting, abnormalities in liver function tests. Patients may develop mouth sores, rash, diarrhea, and abnormalities in blood counts. The patient understands that monitoring is required including LFTs and blood counts.  There is a rare possibility of scarring of the liver and lung problems that can occur when taking methotrexate. Persistent nausea, loss of appetite, pale stools, dark urine, cough, and shortness of breath should be reported immediately. Patient advised to discontinue Arava treatment and consult with a physician prior to attempting conception. The patient will have to undergo a treatment to eliminate Arava from the body prior to conception.
Cimzia Counseling:  I discussed with the patient the risks of Cimzia including but not limited to immunosuppression, allergic reactions and infections.  The patient understands that monitoring is required including a PPD at baseline and must alert us or the primary physician if symptoms of infection or other concerning signs are noted.
Finasteride Pregnancy And Lactation Text: This medication is absolutely contraindicated during pregnancy. It is unknown if it is excreted in breast milk.
Drysol Counseling:  I discussed with the patient the risks of drysol/aluminum chloride including but not limited to skin rash, itching, irritation, burning.
VTAMA Counseling: I discussed with the patient that VTAMA is not for use in the eyes, mouth or mouth. They should call the office if they develop any signs of allergic reactions to VTAMA. The patient verbalized understanding of the proper use and possible adverse effects of VTAMA.  All of the patient's questions and concerns were addressed.
Bactrim Pregnancy And Lactation Text: This medication is Pregnancy Category D and is known to cause fetal risk.  It is also excreted in breast milk.
Topical Metronidazole Pregnancy And Lactation Text: This medication is Pregnancy Category B and considered safe during pregnancy.  It is also considered safe to use while breastfeeding.
Isotretinoin Counseling: Patient should get monthly blood tests, not donate blood, not drive at night if vision affected, not share medication, and not undergo elective surgery for 6 months after tx completed. Side effects reviewed, pt to contact office should one occur.
Picato Counseling:  I discussed with the patient the risks of Picato including but not limited to erythema, scaling, itching, weeping, crusting, and pain.
Sotyktu Counseling:  I discussed the most common side effects of Sotyktu including: common cold, sore throat, sinus infections, cold sores, canker sores, folliculitis, and acne.? I also discussed more serious side effects of Sotyktu including but not limited to: serious allergic reactions; increased risk for infections such as TB; cancers such as lymphomas; rhabdomyolysis and elevated CPK; and elevated triglycerides and liver enzymes.?
Metronidazole Counseling:  I discussed with the patient the risks of metronidazole including but not limited to seizures, nausea/vomiting, a metallic taste in the mouth, nausea/vomiting and severe allergy.
Gabapentin Counseling: I discussed with the patient the risks of gabapentin including but not limited to dizziness, somnolence, fatigue and ataxia.
Hydroxyzine Pregnancy And Lactation Text: This medication is not safe during pregnancy and should not be taken. It is also excreted in breast milk and breast feeding isn't recommended.
Otezla Counseling: The side effects of Otezla were discussed with the patient, including but not limited to worsening or new depression, weight loss, diarrhea, nausea, upper respiratory tract infection, and headache. Patient instructed to call the office should any adverse effect occur.  The patient verbalized understanding of the proper use and possible adverse effects of Otezla.  All the patient's questions and concerns were addressed.
Benzoyl Peroxide Counseling: Patient counseled that medicine may cause skin irritation and bleach clothing.  In the event of skin irritation, the patient was advised to reduce the amount of the drug applied or use it less frequently.   The patient verbalized understanding of the proper use and possible adverse effects of benzoyl peroxide.  All of the patient's questions and concerns were addressed.
Niacinamide Pregnancy And Lactation Text: These medications are considered safe during pregnancy.
Ketoconazole Pregnancy And Lactation Text: This medication is Pregnancy Category C and it isn't know if it is safe during pregnancy. It is also excreted in breast milk and breast feeding isn't recommended.
Siliq Counseling:  I discussed with the patient the risks of Siliq including but not limited to new or worsening depression, suicidal thoughts and behavior, immunosuppression, malignancy, posterior leukoencephalopathy syndrome, and serious infections.  The patient understands that monitoring is required including a PPD at baseline and must alert us or the primary physician if symptoms of infection or other concerning signs are noted. There is also a special program designed to monitor depression which is required with Siliq.
Libtayo Pregnancy And Lactation Text: This medication is contraindicated in pregnancy and when breast feeding.
Winlevi Pregnancy And Lactation Text: This medication is considered safe during pregnancy and breastfeeding.
Birth Control Pills Counseling: Birth Control Pill Counseling: I discussed with the patient the potential side effects of OCPs including but not limited to increased risk of stroke, heart attack, thrombophlebitis, deep venous thrombosis, hepatic adenomas, breast changes, GI upset, headaches, and depression.  The patient verbalized understanding of the proper use and possible adverse effects of OCPs. All of the patient's questions and concerns were addressed.
Odomzo Counseling- I discussed with the patient the risks of Odomzo including but not limited to nausea, vomiting, diarrhea, constipation, weight loss, changes in the sense of taste, decreased appetite, muscle spasms, and hair loss.  The patient verbalized understanding of the proper use and possible adverse effects of Odomzo.  All of the patient's questions and concerns were addressed.
Klisyri Counseling:  I discussed with the patient the risks of Klisyri including but not limited to erythema, scaling, itching, weeping, crusting, and pain.
Cibinqo Counseling: I discussed with the patient the risks of Cibinqo therapy including but not limited to common cold, nausea, headache, cold sores, increased blood CPK levels, dizziness, UTIs, fatigue, acne, and vomitting. Live vaccines should be avoided.  This medication has been linked to serious infections; higher rate of mortality; malignancy and lymphoproliferative disorders; major adverse cardiovascular events; thrombosis; thrombocytopenia and lymphopenia; lipid elevations; and retinal detachment.
Cephalexin Counseling: I counseled the patient regarding use of cephalexin as an antibiotic for prophylactic and/or therapeutic purposes. Cephalexin (commonly prescribed under brand name Keflex) is a cephalosporin antibiotic which is active against numerous classes of bacteria, including most skin bacteria. Side effects may include nausea, diarrhea, gastrointestinal upset, rash, hives, yeast infections, and in rare cases, hepatitis, kidney disease, seizures, fever, confusion, neurologic symptoms, and others. Patients with severe allergies to penicillin medications are cautioned that there is about a 10% incidence of cross-reactivity with cephalosporins. When possible, patients with penicillin allergies should use alternatives to cephalosporins for antibiotic therapy.
Cimzia Pregnancy And Lactation Text: This medication crosses the placenta but can be considered safe in certain situations. Cimzia may be excreted in breast milk.
Cyclophosphamide Counseling:  I discussed with the patient the risks of cyclophosphamide including but not limited to hair loss, hormonal abnormalities, decreased fertility, abdominal pain, diarrhea, nausea and vomiting, bone marrow suppression and infection. The patient understands that monitoring is required while taking this medication.
Topical Metronidazole Counseling: Metronidazole is a topical antibiotic medication. You may experience burning, stinging, redness, or allergic reactions.  Please call our office if you develop any problems from using this medication.
Metronidazole Pregnancy And Lactation Text: This medication is Pregnancy Category B and considered safe during pregnancy.  It is also excreted in breast milk.
Sotyktu Pregnancy And Lactation Text: There is insufficient data to evaluate whether or not Sotyktu is safe to use during pregnancy.? ?It is not known if Sotyktu passes into breast milk and whether or not it is safe to use when breastfeeding.??
Tetracycline Counseling: Patient counseled regarding possible photosensitivity and increased risk for sunburn.  Patient instructed to avoid sunlight, if possible.  When exposed to sunlight, patients should wear protective clothing, sunglasses, and sunscreen.  The patient was instructed to call the office immediately if the following severe adverse effects occur:  hearing changes, easy bruising/bleeding, severe headache, or vision changes.  The patient verbalized understanding of the proper use and possible adverse effects of tetracycline.  All of the patient's questions and concerns were addressed. Patient understands to avoid pregnancy while on therapy due to potential birth defects.
Opioid Pregnancy And Lactation Text: These medications can lead to premature delivery and should be avoided during pregnancy. These medications are also present in breast milk in small amounts.
Otezla Pregnancy And Lactation Text: This medication is Pregnancy Category C and it isn't known if it is safe during pregnancy. It is unknown if it is excreted in breast milk.

## 2024-07-17 ENCOUNTER — TELEMEDICINE (OUTPATIENT)
Dept: SLEEP MEDICINE | Age: 39
End: 2024-07-17
Payer: COMMERCIAL

## 2024-07-17 DIAGNOSIS — G47.8 NON-RESTORATIVE SLEEP: ICD-10-CM

## 2024-07-17 DIAGNOSIS — G25.81 RLS (RESTLESS LEGS SYNDROME): ICD-10-CM

## 2024-07-17 DIAGNOSIS — E61.1 IRON DEFICIENCY: ICD-10-CM

## 2024-07-17 DIAGNOSIS — R29.818 SUSPECTED SLEEP APNEA: Primary | ICD-10-CM

## 2024-07-17 DIAGNOSIS — G47.00 PERSISTENT DISORDER OF INITIATING OR MAINTAINING SLEEP: ICD-10-CM

## 2024-07-17 PROCEDURE — 99203 OFFICE O/P NEW LOW 30 MIN: CPT | Performed by: STUDENT IN AN ORGANIZED HEALTH CARE EDUCATION/TRAINING PROGRAM

## 2024-07-17 PROCEDURE — G8427 DOCREV CUR MEDS BY ELIG CLIN: HCPCS | Performed by: STUDENT IN AN ORGANIZED HEALTH CARE EDUCATION/TRAINING PROGRAM

## 2024-07-17 ASSESSMENT — SLEEP AND FATIGUE QUESTIONNAIRES
HOW LIKELY ARE YOU TO NOD OFF OR FALL ASLEEP WHILE SITTING AND TALKING TO SOMEONE: WOULD NEVER DOZE
HOW LIKELY ARE YOU TO NOD OFF OR FALL ASLEEP WHILE SITTING QUIETLY AFTER LUNCH WITHOUT ALCOHOL: WOULD NEVER DOZE
ESS TOTAL SCORE: 1
HOW LIKELY ARE YOU TO NOD OFF OR FALL ASLEEP WHEN YOU ARE A PASSENGER IN A CAR FOR AN HOUR WITHOUT A BREAK: WOULD NEVER DOZE
HOW LIKELY ARE YOU TO NOD OFF OR FALL ASLEEP WHILE SITTING AND READING: WOULD NEVER DOZE
HOW LIKELY ARE YOU TO NOD OFF OR FALL ASLEEP IN A CAR, WHILE STOPPED FOR A FEW MINUTES IN TRAFFIC: WOULD NEVER DOZE
HOW LIKELY ARE YOU TO NOD OFF OR FALL ASLEEP WHILE WATCHING TV: WOULD NEVER DOZE
HOW LIKELY ARE YOU TO NOD OFF OR FALL ASLEEP WHILE LYING DOWN TO REST IN THE AFTERNOON WHEN CIRCUMSTANCES PERMIT: SLIGHT CHANCE OF DOZING

## 2024-07-17 NOTE — PROGRESS NOTES
cyclobenzaprine (FLEXERIL) 10 MG tablet TAKE 1 AND 1/2 TABLETS BY MOUTH THREE TIMES DAILY AS NEEDED    Meth-Hyo-M Bl-Na Phos-Ph Sal (URIBEL) 118 MG CAPS Take 1 capsule by mouth 4 times daily as needed (bladder pain)    Ubrogepant (UBRELVY) 100 MG TABS Take 100 mg by mouth as needed (for migraine headache, may repeat dose in 2 hours as needed.  Max 200mg/ day)    linaclotide (LINZESS) 290 MCG CAPS capsule Take 1 capsule by mouth every morning (before breakfast)    naproxen (NAPROSYN) 500 MG tablet 60 (Patient not taking: Reported on 7/17/2024)    EPINEPHrine (EPIPEN) 0.3 MG/0.3ML SOAJ injection Inject 0.3 mLs into the muscle once as needed (as needed)     No current facility-administered medications for this visit.           REVIEW OF SYSTEMS:   CONSTITUTIONAL: Fatigue   There is no history of fever, chills, night sweats, weight loss, weight gain, persistent fatigue, or lethargy/hypersomnolence.   EYES:   Denies problems with eye pain, erythema, blurred vision, or visual field loss.   ENTM:   Denies history of tinnitus, epistaxis, sore throat, hoarseness, or dysphonia.   LYMPH:   Denies swollen glands.   CARDIAC: HTN, SVT   No chest pain, pressure, discomfort, palpitations, orthopnea, murmurs, or edema.   GI:   No dysphagia, heartburn reflux, nausea/vomiting, diarrhea, abdominal pain, or bleeding.   :   Denies history of dysuria, hematuria, polyuria, or decreased urine output.   MS:   No history of myalgias, arthralgias, bone pain, or muscle cramps.   SKIN:   No history of rashes, jaundice, cyanosis, nodules, or ulcers.   ENDO:   Negative for heat or cold intolerance.  No history of DM.   PSYCH:   Negative for anxiety, depression, insomnia, hallucinations.   NEURO:   There is no history of AMS, persistent headache, decreased level of consciousness, seizures, or motor or sensory deficits.      PHYSICAL EXAMINATION:  [ INSTRUCTIONS:  \"[x]\" Indicates a positive item  \"[]\" Indicates a negative item   Vital Signs:

## 2024-07-17 NOTE — PATIENT INSTRUCTIONS
noise-free sleep environment will reduce the likelihood that you will wake up during the night.  Noise that does not awaken you may disturb the quality of your sleep.  Carpeting, insulated curtains, and closing the door may help.  Make sure that your bedroom is at a comfortable temperature during the night. Excessively warm or cold sleep environments may disturb sleep.  Eat regular meals and di not go to bed hungry. Hunger may disturb sleep.  A light snack at bedtime (especially carbohydrates) may help sleep, but avoid greasy or heavy foods.  Avoid excessive liquids in the evening. Reducing liquid intake will minimize the need for night-time trips to the bathroom.  Cut down on all caffeine products. Caffeinated beverages and food (Coffee, tea, cola, chocolate) can cause difficulty falling asleep, awakenings during the night, and shallow sleep.  Even caffeine early in the day can disrupt night-time sleep.  Avoid alcohol, especially in the evening. Although alcohol helps tense people fall asleep more easily, it causes awakenings later in the night.   Smoking may disturb sleep. Nicotine is a stimulant.  Try not to smoke during the night when you have trouble sleeping.     Learning about Sleeping Well    What does sleeping well mean?    Sleeping well means getting enough sleep.  How much sleep is enough varies among people.    The number of hours you sleep is not as improtant as how you feel when you wake up.  If you do not feel refreshed, you probably need more sleep.  Another sign of not getting enough sleep is feeling tired during the day.      The average totally nightly sleep time is 7 1/2 to 8 hours.  Healthy adults may need a little more or a little less than this.    Why is getting enough sleep important?    Getting enough quality sleep is a basic part of good health.  When your sleep suffers, your mood and your thoughts can suffer too.  Your thoughts can suffer too.  You ma find yourself feeling more grumpy or

## 2024-07-23 ENCOUNTER — OFFICE VISIT (OUTPATIENT)
Dept: ENDOCRINOLOGY | Age: 39
End: 2024-07-23
Payer: COMMERCIAL

## 2024-07-23 VITALS
DIASTOLIC BLOOD PRESSURE: 88 MMHG | HEART RATE: 74 BPM | BODY MASS INDEX: 31.16 KG/M2 | WEIGHT: 211 LBS | SYSTOLIC BLOOD PRESSURE: 140 MMHG

## 2024-07-23 DIAGNOSIS — E87.6 HYPOKALEMIA: ICD-10-CM

## 2024-07-23 DIAGNOSIS — E61.1 IRON DEFICIENCY: ICD-10-CM

## 2024-07-23 DIAGNOSIS — I10 ESSENTIAL HYPERTENSION: Primary | ICD-10-CM

## 2024-07-23 DIAGNOSIS — I10 ESSENTIAL HYPERTENSION: ICD-10-CM

## 2024-07-23 LAB
ALBUMIN SERPL-MCNC: 3.8 G/DL (ref 3.5–5)
ALBUMIN/GLOB SERPL: 1.1 (ref 1–1.9)
ALP SERPL-CCNC: 73 U/L (ref 35–104)
ALT SERPL-CCNC: 12 U/L (ref 12–65)
ANION GAP SERPL CALC-SCNC: 11 MMOL/L (ref 9–18)
AST SERPL-CCNC: 15 U/L (ref 15–37)
BILIRUB SERPL-MCNC: 0.4 MG/DL (ref 0–1.2)
BUN SERPL-MCNC: 8 MG/DL (ref 6–23)
CALCIUM SERPL-MCNC: 9.1 MG/DL (ref 8.8–10.2)
CHLORIDE SERPL-SCNC: 101 MMOL/L (ref 98–107)
CO2 SERPL-SCNC: 27 MMOL/L (ref 20–28)
CREAT SERPL-MCNC: 0.96 MG/DL (ref 0.6–1.1)
FERRITIN SERPL-MCNC: 137 NG/ML (ref 8–388)
GLOBULIN SER CALC-MCNC: 3.4 G/DL (ref 2.3–3.5)
GLUCOSE SERPL-MCNC: 81 MG/DL (ref 70–99)
POTASSIUM SERPL-SCNC: 3.4 MMOL/L (ref 3.5–5.1)
PROT SERPL-MCNC: 7.2 G/DL (ref 6.3–8.2)
SODIUM SERPL-SCNC: 139 MMOL/L (ref 136–145)

## 2024-07-23 PROCEDURE — G8417 CALC BMI ABV UP PARAM F/U: HCPCS | Performed by: INTERNAL MEDICINE

## 2024-07-23 PROCEDURE — 1036F TOBACCO NON-USER: CPT | Performed by: INTERNAL MEDICINE

## 2024-07-23 PROCEDURE — 3079F DIAST BP 80-89 MM HG: CPT | Performed by: INTERNAL MEDICINE

## 2024-07-23 PROCEDURE — 99204 OFFICE O/P NEW MOD 45 MIN: CPT | Performed by: INTERNAL MEDICINE

## 2024-07-23 PROCEDURE — G8427 DOCREV CUR MEDS BY ELIG CLIN: HCPCS | Performed by: INTERNAL MEDICINE

## 2024-07-23 PROCEDURE — 3077F SYST BP >= 140 MM HG: CPT | Performed by: INTERNAL MEDICINE

## 2024-07-23 RX ORDER — AMOXICILLIN 500 MG/1
56 CAPSULE ORAL
COMMUNITY
Start: 2024-07-22

## 2024-07-23 RX ORDER — FLUCONAZOLE 150 MG/1
TABLET ORAL
COMMUNITY
Start: 2024-07-11

## 2024-07-23 RX ORDER — CLARITHROMYCIN 500 MG/1
28 TABLET, COATED ORAL
COMMUNITY
Start: 2024-07-22

## 2024-07-23 ASSESSMENT — ENCOUNTER SYMPTOMS
CONSTIPATION: 1
DIARRHEA: 0
SHORTNESS OF BREATH: 1

## 2024-07-23 NOTE — PROGRESS NOTES
NOE aSleh MD, [facility] ENDOCRINOLOGY   AND   THYRO[facility]ODULE [facility]IC            Reason for visit: Sri Guy is referred by Dr. Jassi Horton (cardiology) for the evaluation and management of primary hyperaldosteronism.        ASSESSMENT AND PLAN:    1. Essential hypertension  Ms. Guy is referred for concerns about primary hyperaldosteronism.  She may have primary hyperaldosteronism, but it has not been proven.  She has elevated aldosterone with a low-normal renin.  However, those labs were drawn while she was on spironolactone.  Spironolactone confounds aldosterone/renin testing.  She has been off of spironolactone for about 2-1/2 weeks, so I will check aldosterone/renin/potassium today.  If she is now hypokalemic, the spironolactone is not fully antagonizing the aldosterone receptor and the aldosterone and renin levels can be interpreted accurately.  If she is not hypokalemic, she will need to continue holding spironolactone for 2-4 more weeks.  We discussed that most patients with hyperaldosteronism have bilateral adrenal hyperplasia rather than Conn syndrome (unilateral aldosterone-producing adenoma).  Patients with bilateral adrenal hyperplasia are not surgical candidates and should be managed medically with an aldosterone receptor antagonist, with a goal of therapy being titration of the dose to eliminate the need for potassium replacement).  Patients with Conn syndrome are potential surgical candidates.  She indicates that she would be interested in pursuing surgery if she were diagnosed with Conn syndrome.  In that setting, it is reasonable to work her up fully.  If her labs (off of spironolactone) are suggestive of primary hyperaldosteronism (high-normal or elevated aldosterone with suppressed renin), she should have some type of aldosterone suppression testing (either home salt loading with urine aldosterone or a saline suppression test) to confirm.  If this does confirm the

## 2024-07-25 ENCOUNTER — TELEPHONE (OUTPATIENT)
Dept: FAMILY MEDICINE CLINIC | Facility: CLINIC | Age: 39
End: 2024-07-25

## 2024-07-25 NOTE — TELEPHONE ENCOUNTER
Patient called and states she needs to reschedule her appointment. Reschedule for Monday at 3 pm. Patient verbalized understanding.

## 2024-07-26 LAB — ALDOST SERPL-MCNC: 13.8 NG/DL (ref 0–30)

## 2024-07-30 LAB — RENIN PLAS-CCNC: 1.64 NG/ML/HR (ref 0.17–5.38)

## 2024-08-19 RX ORDER — SPIRONOLACTONE 50 MG/1
50 TABLET, FILM COATED ORAL DAILY
Qty: 30 TABLET | Refills: 1 | OUTPATIENT
Start: 2024-08-19

## 2024-09-13 ENCOUNTER — LAB (OUTPATIENT)
Dept: FAMILY MEDICINE CLINIC | Facility: CLINIC | Age: 39
End: 2024-09-13
Payer: COMMERCIAL

## 2024-09-13 DIAGNOSIS — E78.01 FAMILIAL HYPERCHOLESTEROLEMIA: ICD-10-CM

## 2024-09-13 LAB
CHOLEST SERPL-MCNC: 148 MG/DL (ref 0–200)
HDLC SERPL-MCNC: 47 MG/DL (ref 40–60)
HDLC SERPL: 3.1 (ref 0–5)
LDLC SERPL CALC-MCNC: 79 MG/DL (ref 0–100)
TRIGL SERPL-MCNC: 107 MG/DL (ref 0–150)
VLDLC SERPL CALC-MCNC: 21 MG/DL (ref 6–23)

## 2024-09-13 PROCEDURE — 36415 COLL VENOUS BLD VENIPUNCTURE: CPT | Performed by: FAMILY MEDICINE

## 2024-09-24 ENCOUNTER — OFFICE VISIT (OUTPATIENT)
Dept: FAMILY MEDICINE CLINIC | Facility: CLINIC | Age: 39
End: 2024-09-24
Payer: COMMERCIAL

## 2024-09-24 VITALS
HEART RATE: 85 BPM | OXYGEN SATURATION: 97 % | WEIGHT: 210 LBS | SYSTOLIC BLOOD PRESSURE: 130 MMHG | HEIGHT: 69 IN | BODY MASS INDEX: 31.1 KG/M2 | DIASTOLIC BLOOD PRESSURE: 80 MMHG

## 2024-09-24 DIAGNOSIS — E78.01 FAMILIAL HYPERCHOLESTEROLEMIA: ICD-10-CM

## 2024-09-24 DIAGNOSIS — Z72.820 SLEEP DEFICIENT: ICD-10-CM

## 2024-09-24 DIAGNOSIS — T78.2XXD ANAPHYLAXIS, SUBSEQUENT ENCOUNTER: ICD-10-CM

## 2024-09-24 DIAGNOSIS — E78.5 HYPERLIPIDEMIA, UNSPECIFIED HYPERLIPIDEMIA TYPE: ICD-10-CM

## 2024-09-24 DIAGNOSIS — R53.83 OTHER FATIGUE: Primary | ICD-10-CM

## 2024-09-24 DIAGNOSIS — R23.2 HOT FLASHES: ICD-10-CM

## 2024-09-24 LAB — TSH, 3RD GENERATION: 0.88 UIU/ML (ref 0.27–4.2)

## 2024-09-24 PROCEDURE — 3075F SYST BP GE 130 - 139MM HG: CPT | Performed by: FAMILY MEDICINE

## 2024-09-24 PROCEDURE — 1036F TOBACCO NON-USER: CPT | Performed by: FAMILY MEDICINE

## 2024-09-24 PROCEDURE — 99214 OFFICE O/P EST MOD 30 MIN: CPT | Performed by: FAMILY MEDICINE

## 2024-09-24 PROCEDURE — G8427 DOCREV CUR MEDS BY ELIG CLIN: HCPCS | Performed by: FAMILY MEDICINE

## 2024-09-24 PROCEDURE — G8417 CALC BMI ABV UP PARAM F/U: HCPCS | Performed by: FAMILY MEDICINE

## 2024-09-24 PROCEDURE — 3079F DIAST BP 80-89 MM HG: CPT | Performed by: FAMILY MEDICINE

## 2024-09-24 RX ORDER — EPINEPHRINE 0.3 MG/.3ML
0.3 INJECTION SUBCUTANEOUS
Qty: 0.3 ML | Refills: 0 | Status: SHIPPED | OUTPATIENT
Start: 2024-09-24 | End: 2024-09-24

## 2024-09-24 ASSESSMENT — ENCOUNTER SYMPTOMS
VOMITING: 0
SHORTNESS OF BREATH: 0
NAUSEA: 0

## 2024-10-01 ENCOUNTER — TELEMEDICINE (OUTPATIENT)
Dept: FAMILY MEDICINE CLINIC | Facility: CLINIC | Age: 39
End: 2024-10-01
Payer: COMMERCIAL

## 2024-10-01 DIAGNOSIS — G43.909 MIGRAINE WITHOUT STATUS MIGRAINOSUS, NOT INTRACTABLE, UNSPECIFIED MIGRAINE TYPE: Primary | ICD-10-CM

## 2024-10-01 PROCEDURE — 99442 PR PHYS/QHP TELEPHONE EVALUATION 11-20 MIN: CPT | Performed by: FAMILY MEDICINE

## 2024-10-01 ASSESSMENT — ENCOUNTER SYMPTOMS
VOMITING: 0
SHORTNESS OF BREATH: 0
NAUSEA: 0

## 2024-10-01 NOTE — PROGRESS NOTES
PROGRESS NOTE    SUBJECTIVE:   Sri Guy is a 38 y.o. female seen for a follow up visit regarding the following chief complaint:     No chief complaint on file.          HPI patient is doing a phone call visit to go over her lab results for workup of fatigue TSH came back normal patient states she needs a referral to see a neurologist because of her migraines states she has not seen neurologist in a long time but no other medications that I been giving her have been working?      Past Medical History, Past Surgical History, Family history, Social History, and Medications were all reviewed with the patient today and updated as necessary.       Current Outpatient Medications   Medication Sig Dispense Refill    hydroCHLOROthiazide 12.5 MG capsule Take 1 capsule by mouth every morning 90 capsule 1    metoprolol succinate (TOPROL XL) 25 MG extended release tablet Take 1 tablet by mouth daily (Patient taking differently: Take 1 tablet by mouth in the morning and at bedtime) 90 tablet 3    amLODIPine (NORVASC) 10 MG tablet Take 1 tablet by mouth daily 90 tablet 3    clotrimazole-betamethasone (LOTRISONE) 1-0.05 % cream Apply topically 2 times daily. 45 g 3    potassium chloride (KLOR-CON M) 20 MEQ extended release tablet Take 1 tablet by mouth 2 times daily 60 tablet 1    Meth-Hyo-M Bl-Na Phos-Ph Sal (URIBEL) 118 MG CAPS Take 1 capsule by mouth 4 times daily as needed (bladder pain) 30 capsule 3    Ubrogepant (UBRELVY) 100 MG TABS Take 100 mg by mouth as needed (for migraine headache, may repeat dose in 2 hours as needed.  Max 200mg/ day) 10 tablet 11    EPINEPHrine (EPIPEN) 0.3 MG/0.3ML SOAJ injection Inject 0.3 mLs into the muscle once as needed (as needed) 0.3 mL 0     No current facility-administered medications for this visit.     Allergies   Allergen Reactions    Shellfish Allergy Anaphylaxis and Hives    Shrimp Extract Hives     facial hives and throat closing sensation     Patient Active Problem List

## 2024-10-23 ENCOUNTER — HOSPITAL ENCOUNTER (OUTPATIENT)
Dept: SLEEP CENTER | Age: 39
Discharge: HOME | End: 2024-10-26
Payer: COMMERCIAL

## 2024-10-23 PROCEDURE — 95806 SLEEP STUDY UNATT&RESP EFFT: CPT

## 2024-10-28 ENCOUNTER — TELEPHONE (OUTPATIENT)
Dept: FAMILY MEDICINE CLINIC | Facility: CLINIC | Age: 39
End: 2024-10-28

## 2024-10-28 DIAGNOSIS — M19.90 ARTHRITIS: Primary | ICD-10-CM

## 2024-10-30 ENCOUNTER — LAB (OUTPATIENT)
Dept: FAMILY MEDICINE CLINIC | Facility: CLINIC | Age: 39
End: 2024-10-30
Payer: COMMERCIAL

## 2024-10-30 DIAGNOSIS — M19.90 ARTHRITIS: ICD-10-CM

## 2024-10-30 LAB
ERYTHROCYTE [SEDIMENTATION RATE] IN BLOOD: 4 MM/HR (ref 0–20)
URATE SERPL-MCNC: 5.8 MG/DL (ref 2.5–7.1)

## 2024-10-30 PROCEDURE — 36415 COLL VENOUS BLD VENIPUNCTURE: CPT | Performed by: FAMILY MEDICINE

## 2024-10-31 LAB — RHEUMATOID FACT SER QL LA: NEGATIVE

## 2024-11-01 LAB
ANA SER QL: POSITIVE
CCP IGA+IGG SERPL IA-ACNC: 7 UNITS (ref 0–19)
CENTROMERE B AB SER-ACNC: <0.2 AI (ref 0–0.9)
CHROMATIN AB SERPL-ACNC: 0.2 AI (ref 0–0.9)
CRP SERPL-MCNC: 4 MG/L (ref 0–10)
DSDNA AB SER-ACNC: <1 IU/ML (ref 0–9)
ENA JO1 AB SER-ACNC: <0.2 AI (ref 0–0.9)
ENA RNP AB SER-ACNC: <0.2 AI (ref 0–0.9)
ENA SCL70 AB SER-ACNC: <0.2 AI (ref 0–0.9)
ENA SM AB SER-ACNC: <0.2 AI (ref 0–0.9)
ENA SS-A AB SER-ACNC: >8 AI (ref 0–0.9)
ENA SS-B AB SER-ACNC: <0.2 AI (ref 0–0.9)
Lab: ABNORMAL

## 2024-11-14 DIAGNOSIS — T78.2XXD ANAPHYLAXIS, SUBSEQUENT ENCOUNTER: ICD-10-CM

## 2024-11-14 DIAGNOSIS — G43.919 INTRACTABLE MIGRAINE WITHOUT STATUS MIGRAINOSUS, UNSPECIFIED MIGRAINE TYPE: ICD-10-CM

## 2024-11-15 RX ORDER — EPINEPHRINE 0.3 MG/.3ML
0.3 INJECTION SUBCUTANEOUS
Qty: 0.3 ML | Refills: 0 | Status: SHIPPED | OUTPATIENT
Start: 2024-11-15 | End: 2024-11-15

## 2024-11-15 RX ORDER — UBROGEPANT 100 MG/1
100 TABLET ORAL PRN
Qty: 10 TABLET | Refills: 11 | Status: SHIPPED | OUTPATIENT
Start: 2024-11-15

## 2024-11-18 ENCOUNTER — OFFICE VISIT (OUTPATIENT)
Dept: UROLOGY | Age: 39
End: 2024-11-18
Payer: COMMERCIAL

## 2024-11-18 DIAGNOSIS — N20.0 RENAL STONE: ICD-10-CM

## 2024-11-18 DIAGNOSIS — N39.0 RECURRENT UTI: ICD-10-CM

## 2024-11-18 DIAGNOSIS — N30.10 INTERSTITIAL CYSTITIS: Primary | ICD-10-CM

## 2024-11-18 DIAGNOSIS — R31.29 MICROHEMATURIA: ICD-10-CM

## 2024-11-18 DIAGNOSIS — R39.89 BLADDER PAIN: ICD-10-CM

## 2024-11-18 LAB
BILIRUBIN, URINE, POC: NEGATIVE
BLOOD URINE, POC: NORMAL
GLUCOSE URINE, POC: NEGATIVE MG/DL
KETONES, URINE, POC: NEGATIVE MG/DL
LEUKOCYTE ESTERASE, URINE, POC: NEGATIVE
NITRITE, URINE, POC: NEGATIVE
PH, URINE, POC: 6.5 (ref 4.6–8)
PROTEIN,URINE, POC: NORMAL MG/DL
SPECIFIC GRAVITY, URINE, POC: 1.03 (ref 1–1.03)
URINALYSIS CLARITY, POC: NORMAL
URINALYSIS COLOR, POC: NORMAL
UROBILINOGEN, POC: NORMAL MG/DL

## 2024-11-18 PROCEDURE — 99214 OFFICE O/P EST MOD 30 MIN: CPT | Performed by: NURSE PRACTITIONER

## 2024-11-18 PROCEDURE — G8417 CALC BMI ABV UP PARAM F/U: HCPCS | Performed by: NURSE PRACTITIONER

## 2024-11-18 PROCEDURE — G8484 FLU IMMUNIZE NO ADMIN: HCPCS | Performed by: NURSE PRACTITIONER

## 2024-11-18 PROCEDURE — G8427 DOCREV CUR MEDS BY ELIG CLIN: HCPCS | Performed by: NURSE PRACTITIONER

## 2024-11-18 PROCEDURE — 1036F TOBACCO NON-USER: CPT | Performed by: NURSE PRACTITIONER

## 2024-11-18 PROCEDURE — 81003 URINALYSIS AUTO W/O SCOPE: CPT | Performed by: NURSE PRACTITIONER

## 2024-11-18 RX ORDER — METHENAMINE, BENZOIC ACID, PHENYL SALICYLATE, METHYLENE BLUE, AND HYOSCYAMINE SULFATE 9; .12; 81.6; 10.8; 36.2 MG/1; MG/1; MG/1; MG/1; MG/1
81.6 TABLET, COATED ORAL 4 TIMES DAILY
Qty: 90 TABLET | Refills: 3 | Status: SHIPPED | OUTPATIENT
Start: 2024-11-18

## 2024-11-18 ASSESSMENT — ENCOUNTER SYMPTOMS
NAUSEA: 0
BACK PAIN: 0

## 2024-11-18 NOTE — PROGRESS NOTES
[facility]  [address] [address]  [phone]          Sri Guy  : 1985    Chief Complaint   Patient presents with    Follow-up          HPI     Sri Guy is a 38 y.o. female previously followed by Dr Ling. Prior to this, she was followed by EVARISTO Blanchard NP and [facility]. H/O IC, recurrent UTI, and renal stones.     H/O partial hysterectomy.  Has some degenerative disease of the spine. She also has irritable bowels been on Linzess.  She previously tried Elmiron and Myrbetriq wo change in LUTS. She had a CT scan in 2017 did not show any  pathology.       S/P cystoscopy, hydrodistention of the bladder and urethral dilatation (). This helped her sx mildly.      She has previously had some relief w instills of the bladder. Uribel has been most helpful. Her biggest dietary triggers include spaghetti and spicy chicken.     She was seen for bladder pain and gross hematuria . ?IC flare. Sx cont to worsen. Seen back in office . CT a/p hematuria protocol showed NO etiology for hematuria. NO renal stones. Underwent cysto/hydrodistention on 21 which revealed IC. Urine cytology also sent which was negative for malignant cells.    MRI abd 24 showed normal kidneys    sCr (24) 0.96      Past Medical History:   Diagnosis Date    Acute dysfunction of both eustachian tubes     Anemia     Bladder pain     Chronic lower back pain     Essential hypertension     History of kidney stones     Hypokalemia     Interstitial cystitis     Irritable bowel syndrome     Meniere's disease     Paresthesia and pain of right extremity     Pulmonary embolus (HCC) 2015    SVT (supraventricular tachycardia) (HCC)     Varicella      Past Surgical History:   Procedure Laterality Date    ABLATION OF DYSRHYTHMIC FOCUS  02/15/2016    ABLATION OF DYSRHYTHMIC FOCUS  2017     SECTION      X3    COLONOSCOPY N/A 2017    COLONOSCOPY

## 2024-11-25 ENCOUNTER — OFFICE VISIT (OUTPATIENT)
Dept: ENDOCRINOLOGY | Age: 39
End: 2024-11-25
Payer: COMMERCIAL

## 2024-11-25 VITALS — DIASTOLIC BLOOD PRESSURE: 85 MMHG | BODY MASS INDEX: 32.05 KG/M2 | WEIGHT: 217 LBS | SYSTOLIC BLOOD PRESSURE: 130 MMHG

## 2024-11-25 DIAGNOSIS — I10 ESSENTIAL HYPERTENSION: Primary | ICD-10-CM

## 2024-11-25 DIAGNOSIS — E87.6 HYPOKALEMIA: ICD-10-CM

## 2024-11-25 DIAGNOSIS — I10 ESSENTIAL HYPERTENSION: ICD-10-CM

## 2024-11-25 LAB
ALBUMIN SERPL-MCNC: 4 G/DL (ref 3.5–5)
ALBUMIN/GLOB SERPL: 1.2 (ref 1–1.9)
ALP SERPL-CCNC: 87 U/L (ref 35–104)
ALT SERPL-CCNC: 13 U/L (ref 8–45)
ANION GAP SERPL CALC-SCNC: 12 MMOL/L (ref 7–16)
AST SERPL-CCNC: 15 U/L (ref 15–37)
BILIRUB SERPL-MCNC: 0.3 MG/DL (ref 0–1.2)
BUN SERPL-MCNC: 13 MG/DL (ref 6–23)
CALCIUM SERPL-MCNC: 9.4 MG/DL (ref 8.8–10.2)
CHLORIDE SERPL-SCNC: 104 MMOL/L (ref 98–107)
CO2 SERPL-SCNC: 26 MMOL/L (ref 20–29)
CREAT SERPL-MCNC: 0.96 MG/DL (ref 0.6–1.1)
GLOBULIN SER CALC-MCNC: 3.5 G/DL (ref 2.3–3.5)
GLUCOSE SERPL-MCNC: 85 MG/DL (ref 70–99)
POTASSIUM SERPL-SCNC: 3.6 MMOL/L (ref 3.5–5.1)
PROT SERPL-MCNC: 7.5 G/DL (ref 6.3–8.2)
SODIUM SERPL-SCNC: 142 MMOL/L (ref 136–145)

## 2024-11-25 PROCEDURE — 1036F TOBACCO NON-USER: CPT | Performed by: INTERNAL MEDICINE

## 2024-11-25 PROCEDURE — G8417 CALC BMI ABV UP PARAM F/U: HCPCS | Performed by: INTERNAL MEDICINE

## 2024-11-25 PROCEDURE — 99213 OFFICE O/P EST LOW 20 MIN: CPT | Performed by: INTERNAL MEDICINE

## 2024-11-25 PROCEDURE — 3079F DIAST BP 80-89 MM HG: CPT | Performed by: INTERNAL MEDICINE

## 2024-11-25 PROCEDURE — G8484 FLU IMMUNIZE NO ADMIN: HCPCS | Performed by: INTERNAL MEDICINE

## 2024-11-25 PROCEDURE — G8427 DOCREV CUR MEDS BY ELIG CLIN: HCPCS | Performed by: INTERNAL MEDICINE

## 2024-11-25 PROCEDURE — 3075F SYST BP GE 130 - 139MM HG: CPT | Performed by: INTERNAL MEDICINE

## 2024-11-25 ASSESSMENT — ENCOUNTER SYMPTOMS
CONSTIPATION: 1
SHORTNESS OF BREATH: 1
DIARRHEA: 0

## 2024-11-25 NOTE — PROGRESS NOTES
NOE Saleh MD, [facility] ENDOCRINOLOGY   AND   THYROID NODULE CLINIC            Reason for visit: Follow-up hyperaldosteronism.        ASSESSMENT AND PLAN:    1. Essential hypertension  Ms. Guy was initially referred to me for concerns about primary hyperaldosteronism.  At that time, I felt that she might have primary hyperaldosteronism.  She had a history of mildly elevated aldosterone with a low-normal renin, but, those labs had been drawn while she was on spironolactone.  Spironolactone confounds aldosterone/renin testing (often resulting in elevated aldosterone levels).  When I saw her in July 2024, she had been off of spironolactone for about 2-1/2 weeks.  At that time, aldosterone was normal and renin was non-suppressed, making primary hyperaldosteronism unlikely.  I had recommended that she stay off of aldosterone receptor antagonist like spironolactone for a while longer so that she could be retested.  She indicates that Dr. Collins recently asked her to resume spironolactone, though that is not documented in the chart (as such, she may have resumed it on her own).  She does not know her current dose.  Unfortunately, now that she is back on spironolactone, aldosterone and renin testing are confounded.  In any event, prior testing in July 2024 seems to have excluded primary hyperaldosteronism.  Likewise, multiple adrenal imaging studies (most recently MRI) have demonstrated normal adrenal glands, making surgically-correctable hyperaldosteronism very unlikely.  I will check labs as below today (with caveats as above).  I will defer ongoing evaluation and management of her hypertension to Sheridan Collins and Clifford.  She does not need to follow up with me.  - Comprehensive Metabolic Panel; Future  - Aldosterone; Future  - Renin; Future    2. Hypokalemia  As above.  - Comprehensive Metabolic Panel; Future  - Aldosterone; Future  - Renin; Future      Follow-up and Dispositions    Return as needed

## 2024-11-27 LAB — ALDOST SERPL-MCNC: 19.2 NG/DL (ref 0–30)

## 2024-12-02 LAB — RENIN PLAS-CCNC: 1.3 NG/ML/HR (ref 0.17–5.38)

## 2025-01-14 ENCOUNTER — OFFICE VISIT (OUTPATIENT)
Dept: FAMILY MEDICINE CLINIC | Facility: CLINIC | Age: 40
End: 2025-01-14
Payer: COMMERCIAL

## 2025-01-14 VITALS
DIASTOLIC BLOOD PRESSURE: 100 MMHG | HEIGHT: 69 IN | WEIGHT: 223 LBS | SYSTOLIC BLOOD PRESSURE: 160 MMHG | BODY MASS INDEX: 33.03 KG/M2 | HEART RATE: 91 BPM

## 2025-01-14 DIAGNOSIS — H69.91 DYSFUNCTION OF RIGHT EUSTACHIAN TUBE: Primary | ICD-10-CM

## 2025-01-14 DIAGNOSIS — J01.11 ACUTE RECURRENT FRONTAL SINUSITIS: ICD-10-CM

## 2025-01-14 PROCEDURE — 3077F SYST BP >= 140 MM HG: CPT | Performed by: FAMILY MEDICINE

## 2025-01-14 PROCEDURE — 99213 OFFICE O/P EST LOW 20 MIN: CPT | Performed by: FAMILY MEDICINE

## 2025-01-14 PROCEDURE — 96372 THER/PROPH/DIAG INJ SC/IM: CPT | Performed by: FAMILY MEDICINE

## 2025-01-14 PROCEDURE — G8417 CALC BMI ABV UP PARAM F/U: HCPCS | Performed by: FAMILY MEDICINE

## 2025-01-14 PROCEDURE — 1036F TOBACCO NON-USER: CPT | Performed by: FAMILY MEDICINE

## 2025-01-14 PROCEDURE — G8427 DOCREV CUR MEDS BY ELIG CLIN: HCPCS | Performed by: FAMILY MEDICINE

## 2025-01-14 PROCEDURE — 3080F DIAST BP >= 90 MM HG: CPT | Performed by: FAMILY MEDICINE

## 2025-01-14 RX ORDER — LINACLOTIDE 290 UG/1
180 CAPSULE, GELATIN COATED ORAL
COMMUNITY

## 2025-01-14 RX ORDER — FAMOTIDINE 40 MG/1
TABLET, FILM COATED ORAL
COMMUNITY

## 2025-01-14 RX ORDER — AMOXICILLIN 500 MG/1
500 CAPSULE ORAL 2 TIMES DAILY
Qty: 20 CAPSULE | Refills: 0 | Status: SHIPPED | OUTPATIENT
Start: 2025-01-14 | End: 2025-01-24

## 2025-01-14 RX ORDER — CELECOXIB 200 MG/1
CAPSULE ORAL
COMMUNITY
Start: 2024-10-31

## 2025-01-14 RX ORDER — FLUTICASONE PROPIONATE 50 MCG
SPRAY, SUSPENSION (ML) NASAL
COMMUNITY

## 2025-01-14 RX ORDER — TRIAMCINOLONE ACETONIDE 40 MG/ML
40 INJECTION, SUSPENSION INTRA-ARTICULAR; INTRAMUSCULAR ONCE
Status: COMPLETED | OUTPATIENT
Start: 2025-01-14 | End: 2025-01-14

## 2025-01-14 RX ADMIN — TRIAMCINOLONE ACETONIDE 40 MG: 40 INJECTION, SUSPENSION INTRA-ARTICULAR; INTRAMUSCULAR at 15:42

## 2025-01-14 ASSESSMENT — ENCOUNTER SYMPTOMS
COUGH: 0
VOICE CHANGE: 0
SINUS PRESSURE: 0
NAUSEA: 0
VOMITING: 0
WHEEZING: 0
SORE THROAT: 0
RHINORRHEA: 0

## 2025-01-14 NOTE — PROGRESS NOTES
PROGRESS NOTE    SUBJECTIVE:   Sri Guy is a 39 y.o. female seen for a follow up visit regarding the following chief complaint:     Chief Complaint   Patient presents with    Other     R ear pain x 3 day.            HPI patient presents office today for right ear pain some drainage history of myringotomy/tubes in her left ear      Past Medical History, Past Surgical History, Family history, Social History, and Medications were all reviewed with the patient today and updated as necessary.       Current Outpatient Medications   Medication Sig Dispense Refill    celecoxib (CELEBREX) 200 MG capsule 1 capsule with food Orally Once a day for 30 days      URIBEL 81.6 MG TABS Take 1 tablet by mouth 4 times daily 90 tablet 3    EPINEPHrine (EPIPEN) 0.3 MG/0.3ML SOAJ injection INJECT 0.3 MLS INTO THE MUSCLE ONCE AS NEEDED (AS NEEDED) 0.3 mL 0    UBRELVY 100 MG TABS TAKE 100 MG BY MOUTH AS NEEDED (FOR MIGRAINE HEADACHE, MAY REPEAT DOSE IN 2 HOURS AS NEEDED. MAX 200MG/ DAY) 10 tablet 11    hydroCHLOROthiazide 12.5 MG capsule Take 1 capsule by mouth every morning 90 capsule 1    metoprolol succinate (TOPROL XL) 25 MG extended release tablet Take 1 tablet by mouth daily (Patient taking differently: Take 1 tablet by mouth in the morning and at bedtime) 90 tablet 3    amLODIPine (NORVASC) 10 MG tablet Take 1 tablet by mouth daily 90 tablet 3    clotrimazole-betamethasone (LOTRISONE) 1-0.05 % cream Apply topically 2 times daily. 45 g 3    potassium chloride (KLOR-CON M) 20 MEQ extended release tablet Take 1 tablet by mouth 2 times daily 60 tablet 1    famotidine (PEPCID) 40 MG tablet 180      fluticasone (FLONASE) 50 MCG/ACT nasal spray 48      linaclotide (LINZESS) 290 MCG CAPS capsule 180 capsules       No current facility-administered medications for this visit.     Allergies   Allergen Reactions    Shellfish Allergy Anaphylaxis and Hives    Shrimp Extract Hives     facial hives and throat closing sensation     Patient

## 2025-01-30 ENCOUNTER — OFFICE VISIT (OUTPATIENT)
Dept: NEUROLOGY | Age: 40
End: 2025-01-30

## 2025-01-30 VITALS
OXYGEN SATURATION: 96 % | WEIGHT: 215 LBS | SYSTOLIC BLOOD PRESSURE: 151 MMHG | BODY MASS INDEX: 31.84 KG/M2 | HEART RATE: 86 BPM | DIASTOLIC BLOOD PRESSURE: 87 MMHG | HEIGHT: 69 IN

## 2025-01-30 DIAGNOSIS — G43.E09 CHRONIC MIGRAINE WITH AURA WITHOUT STATUS MIGRAINOSUS, NOT INTRACTABLE: Primary | ICD-10-CM

## 2025-01-30 RX ORDER — RIMEGEPANT SULFATE 75 MG/75MG
TABLET, ORALLY DISINTEGRATING ORAL
Qty: 9 TABLET | Refills: 3 | Status: SHIPPED | OUTPATIENT
Start: 2025-01-30

## 2025-01-30 RX ORDER — SPIRONOLACTONE 50 MG/1
50 TABLET, FILM COATED ORAL DAILY
COMMUNITY
Start: 2025-01-08

## 2025-01-30 RX ORDER — ATOGEPANT 60 MG/1
60 TABLET ORAL DAILY
Qty: 30 TABLET | Refills: 2 | Status: SHIPPED | OUTPATIENT
Start: 2025-01-30 | End: 2025-03-01

## 2025-01-30 NOTE — ASSESSMENT & PLAN NOTE
Patient migraines currently not optimized on beta blocker. She has tried and failed multiple other first line therapies or cannot use some as listed in my documentation. Proceed with starting qulipta for prevention. Provided her with samples. She will take 1 tablet by mouth once daily.     D/C Ubrevely, ineffective. D/C excedrin, discussed some of her headaches may be coming from medication overuse.     Replace with nurtec for rescue. Take 1 tablet by mouth at onset of migraine, do not take more than 1 tablet in 24 hours.  This delivery modality may be best given the rapidly intensifying aspect of her migraines.

## 2025-01-30 NOTE — PROGRESS NOTES
Sri Guy is a 39 y.o. female who presents with headaches.        CC: Headache        Referred by   Deon Marquez DO        HPI:      Headaches are located bi-parietal and vertex of head or left retro-orbital or occipital radiating to neck.  They began ~teenage years.  The current frequency of headaches: 6-7x per month which has increased in the last 2 years.  Duration: 1.5-3 days.  Severity is in the last 3 months have increased in intensity, approx moderate to severe. Quality of headaches are described as throbbings.  Associated symptoms: photophobia, phonophobia, nausea, no vomiting, loss of appetite, neck pain, dizziness. Occasional osmophobia, facial numbness and hand numbness, diplopia, blurred vision, visual floaters.  The headaches are exacerbated by have not been able to identify any triggers. Factors that relieve the headaches are ubrevely 100mg with Excedrin 2 hours later. Previous Imaging Yes, MRI Brain normal. Migraines intensify within 1 hour.       She will take 2-4 excedrin tablets per day when she has the headache, has been doing this for ~8 years.       Has had kidney stones in the past, takes uribel, taking this IC. Has a hysterectomy.       Has had a history of a PE 1 week PP in 2015      Exercises 3x per week with . Does report difficulty falling asleep at night.     P O U N D    HEADACHE     4 / 5.     P ulsatile :  yes    O nset :  yes      Unilateral :  no        N ausea / vomit :  yes         D ebility :  yes  Positive = 3 or more.    For migraine type headaches.       MIGRAINE  PHOBIAS :   5 / 5.                 PHOTO :  yes                  PHONO :  yes                   OSMO :  yes                     TACTILE :  yes                        VESTIBULO :  yes        MEDICATION TRIALS:         Prophylactics/Preventives::    beta blockers, amitriptyline (could not fully empty), avoid topamax due to history of kidney stones., SSRI (lexapro, visual hallucinations), avoid

## 2025-02-07 ENCOUNTER — TELEPHONE (OUTPATIENT)
Dept: NEUROLOGY | Age: 40
End: 2025-02-07

## 2025-02-07 NOTE — TELEPHONE ENCOUNTER
Approved today by [employer] 2017  Approved. This drug has been approved under the Member's Medicare Part D benefit. Approved quantity: 30 units per 30 day(s). You may fill up to a 90 day supply except for those on Specialty Tier 5.

## 2025-02-17 ENCOUNTER — PATIENT MESSAGE (OUTPATIENT)
Dept: NEUROLOGY | Age: 40
End: 2025-02-17

## 2025-02-17 ENCOUNTER — TELEPHONE (OUTPATIENT)
Dept: NEUROLOGY | Age: 40
End: 2025-02-17

## 2025-02-17 ENCOUNTER — OFFICE VISIT (OUTPATIENT)
Dept: FAMILY MEDICINE CLINIC | Facility: CLINIC | Age: 40
End: 2025-02-17
Payer: COMMERCIAL

## 2025-02-17 VITALS
HEIGHT: 69 IN | BODY MASS INDEX: 32.73 KG/M2 | WEIGHT: 221 LBS | DIASTOLIC BLOOD PRESSURE: 100 MMHG | SYSTOLIC BLOOD PRESSURE: 152 MMHG

## 2025-02-17 DIAGNOSIS — G43.919 INTRACTABLE MIGRAINE WITHOUT STATUS MIGRAINOSUS, UNSPECIFIED MIGRAINE TYPE: ICD-10-CM

## 2025-02-17 PROCEDURE — 3077F SYST BP >= 140 MM HG: CPT | Performed by: FAMILY MEDICINE

## 2025-02-17 PROCEDURE — 1036F TOBACCO NON-USER: CPT | Performed by: FAMILY MEDICINE

## 2025-02-17 PROCEDURE — G8428 CUR MEDS NOT DOCUMENT: HCPCS | Performed by: FAMILY MEDICINE

## 2025-02-17 PROCEDURE — 96372 THER/PROPH/DIAG INJ SC/IM: CPT | Performed by: FAMILY MEDICINE

## 2025-02-17 PROCEDURE — 3080F DIAST BP >= 90 MM HG: CPT | Performed by: FAMILY MEDICINE

## 2025-02-17 PROCEDURE — G8417 CALC BMI ABV UP PARAM F/U: HCPCS | Performed by: FAMILY MEDICINE

## 2025-02-17 PROCEDURE — 99213 OFFICE O/P EST LOW 20 MIN: CPT | Performed by: FAMILY MEDICINE

## 2025-02-17 RX ORDER — HYDROCODONE BITARTRATE AND ACETAMINOPHEN 10; 325 MG/1; MG/1
1 TABLET ORAL EVERY 6 HOURS PRN
COMMUNITY
Start: 2025-01-30

## 2025-02-17 RX ORDER — UBROGEPANT 100 MG/1
100 TABLET ORAL PRN
Qty: 10 TABLET | Refills: 11 | Status: SHIPPED | OUTPATIENT
Start: 2025-02-17

## 2025-02-17 RX ORDER — KETOROLAC TROMETHAMINE 30 MG/ML
60 INJECTION, SOLUTION INTRAMUSCULAR; INTRAVENOUS ONCE
Status: COMPLETED | OUTPATIENT
Start: 2025-02-17 | End: 2025-02-17

## 2025-02-17 RX ADMIN — KETOROLAC TROMETHAMINE 60 MG: 30 INJECTION, SOLUTION INTRAMUSCULAR; INTRAVENOUS at 16:37

## 2025-02-17 ASSESSMENT — ENCOUNTER SYMPTOMS
DIARRHEA: 0
NAUSEA: 0
VOMITING: 0
SORE THROAT: 0

## 2025-02-17 NOTE — TELEPHONE ENCOUNTER
Spoke with pt and she stated that she has been dealing with a headache since Friday. Pt stated that she has been nauseous and  vomiting. I suggested that she go to the ER, since you were out of the office until tomorrow.

## 2025-02-17 NOTE — PROGRESS NOTES
PROGRESS NOTE    SUBJECTIVE:   Sri Guy is a 39 y.o. female seen for a follow up visit regarding the following chief complaint:     Chief Complaint   Patient presents with    Migraine           HPI patient presents office complaining of a migraine states that she ran out of Ubrelvy because the pharmacy needs a prior authorization patient with typical migraine being followed by neurology      Past Medical History, Past Surgical History, Family history, Social History, and Medications were all reviewed with the patient today and updated as necessary.       Current Outpatient Medications   Medication Sig Dispense Refill    HYDROcodone-acetaminophen (NORCO)  MG per tablet Take 1 tablet by mouth every 6 hours as needed. Max Daily Amount: 4 tablets      Ubrogepant (UBRELVY) 100 MG TABS Take 100 mg by mouth as needed (for migraine headache, may repeat dose in 2 hours as needed.  Max 200mg/ day) 10 tablet 11    spironolactone (ALDACTONE) 50 MG tablet Take 1 tablet by mouth daily      Atogepant (QULIPTA) 60 MG TABS Take 60 mg by mouth daily Take 1 tablet by mouth once daily 30 tablet 2    famotidine (PEPCID) 40 MG tablet 180      fluticasone (FLONASE) 50 MCG/ACT nasal spray 48      linaclotide (LINZESS) 290 MCG CAPS capsule 180 capsules      URIBEL 81.6 MG TABS Take 1 tablet by mouth 4 times daily 90 tablet 3    EPINEPHrine (EPIPEN) 0.3 MG/0.3ML SOAJ injection INJECT 0.3 MLS INTO THE MUSCLE ONCE AS NEEDED (AS NEEDED) 0.3 mL 0    metoprolol succinate (TOPROL XL) 25 MG extended release tablet Take 1 tablet by mouth daily (Patient taking differently: Take 1 tablet by mouth in the morning and at bedtime) 90 tablet 3    amLODIPine (NORVASC) 10 MG tablet Take 1 tablet by mouth daily 90 tablet 3    clotrimazole-betamethasone (LOTRISONE) 1-0.05 % cream Apply topically 2 times daily. 45 g 3    potassium chloride (KLOR-CON M) 20 MEQ extended release tablet Take 1 tablet by mouth 2 times daily 60 tablet 1

## 2025-02-18 NOTE — TELEPHONE ENCOUNTER
Returned patients call. She developed a migraine on Friday which was non responsive to Nurtec. She reports the migraine intensified and progressed to her typical facial tingling that has historically occurred with severe migraines. She has been on Qulipta for prevention since I saw her on 1/30/2024. She did see her PCP yesterday and received a tordol injection which was helpful.Additionally, she would like to switch back to using Ubrevely for rescue. This was more effective than Nurtec. Dr. Collins office sent in script and she just got a text her meds were ready from the pharmacy. Advised her that a reasonable trial on migraine prevention is 90 days. When I see her back we will reassess how well she is doing on Qulipta and could consider switching to Ajovy or Emgality for prevention. She opted for the oral option which is why we pursued Qulitpta.

## 2025-03-13 DIAGNOSIS — I10 HYPERTENSION, UNSPECIFIED TYPE: ICD-10-CM

## 2025-03-13 DIAGNOSIS — E55.9 VITAMIN D DEFICIENCY, UNSPECIFIED: ICD-10-CM

## 2025-03-13 DIAGNOSIS — Z00.00 LABORATORY EXAMINATION ORDERED AS PART OF A ROUTINE GENERAL MEDICAL EXAMINATION: Primary | ICD-10-CM

## 2025-03-13 DIAGNOSIS — E78.5 HYPERLIPIDEMIA, UNSPECIFIED HYPERLIPIDEMIA TYPE: ICD-10-CM

## 2025-03-25 ENCOUNTER — TELEPHONE (OUTPATIENT)
Age: 40
End: 2025-03-25

## 2025-03-25 ENCOUNTER — LAB (OUTPATIENT)
Dept: FAMILY MEDICINE CLINIC | Facility: CLINIC | Age: 40
End: 2025-03-25

## 2025-03-25 DIAGNOSIS — E78.5 HYPERLIPIDEMIA, UNSPECIFIED HYPERLIPIDEMIA TYPE: ICD-10-CM

## 2025-03-25 DIAGNOSIS — Z00.00 LABORATORY EXAMINATION ORDERED AS PART OF A ROUTINE GENERAL MEDICAL EXAMINATION: ICD-10-CM

## 2025-03-25 DIAGNOSIS — E55.9 VITAMIN D DEFICIENCY, UNSPECIFIED: ICD-10-CM

## 2025-03-25 LAB
25(OH)D3 SERPL-MCNC: 11.2 NG/ML (ref 30–100)
ALBUMIN SERPL-MCNC: 3.9 G/DL (ref 3.5–5)
ALBUMIN/GLOB SERPL: 1.1 (ref 1–1.9)
ALP SERPL-CCNC: 80 U/L (ref 35–104)
ALT SERPL-CCNC: 15 U/L (ref 8–45)
ANION GAP SERPL CALC-SCNC: 10 MMOL/L (ref 7–16)
AST SERPL-CCNC: 14 U/L (ref 15–37)
BILIRUB SERPL-MCNC: 0.4 MG/DL (ref 0–1.2)
BILIRUBIN, URINE, POC: NEGATIVE
BLOOD URINE, POC: NEGATIVE
BUN SERPL-MCNC: 13 MG/DL (ref 6–23)
CALCIUM SERPL-MCNC: 9.7 MG/DL (ref 8.8–10.2)
CHLORIDE SERPL-SCNC: 101 MMOL/L (ref 98–107)
CHOLEST SERPL-MCNC: 165 MG/DL (ref 0–200)
CO2 SERPL-SCNC: 29 MMOL/L (ref 20–29)
CREAT SERPL-MCNC: 1.06 MG/DL (ref 0.6–1.1)
GLOBULIN SER CALC-MCNC: 3.5 G/DL (ref 2.3–3.5)
GLUCOSE SERPL-MCNC: 81 MG/DL (ref 70–99)
GLUCOSE URINE, POC: NEGATIVE
GRANS ABSOLUTE, POC: 3.7 K/UL
GRANULOCYTES %, POC: 49.8 %
HDLC SERPL-MCNC: 49 MG/DL (ref 40–60)
HDLC SERPL: 3.4 (ref 0–5)
HEMATOCRIT, POC: 43.1 %
HEMOGLOBIN, POC: 14 G/DL
KETONES, URINE, POC: NEGATIVE
LDLC SERPL CALC-MCNC: 96 MG/DL (ref 0–100)
LEUKOCYTE ESTERASE, URINE, POC: NEGATIVE
LYMPHOCYTE %, POC: 40.7 %
LYMPHS ABSOLUTE, POC: 3 K/UL
MCH, POC: NORMAL PG (ref 40–?)
MCHC, POC: 32.5
MCV, POC: 90.5
MONOCYTE %, POC: 9.5 %
MONOCYTE, ABSOLUTE POC: 0.7 K/UL
MPV, POC: 7.8 FL
NITRITE, URINE, POC: NEGATIVE
PH, URINE, POC: 7 (ref 4.6–8)
PLATELET COUNT, POC: 296 K/UL
POTASSIUM SERPL-SCNC: 3.8 MMOL/L (ref 3.5–5.1)
PROT SERPL-MCNC: 7.4 G/DL (ref 6.3–8.2)
PROTEIN,URINE, POC: NEGATIVE
RBC, POC: 4.76 M/UL
RDW, POC: 11.8 %
SODIUM SERPL-SCNC: 140 MMOL/L (ref 136–145)
SPECIFIC GRAVITY, URINE, POC: 1.01 (ref 1–1.03)
TRIGL SERPL-MCNC: 99 MG/DL (ref 0–150)
TSH, 3RD GENERATION: 2.7 UIU/ML (ref 0.27–4.2)
URINALYSIS CLARITY, POC: NORMAL
URINALYSIS COLOR, POC: YELLOW
UROBILINOGEN, POC: NORMAL
VLDLC SERPL CALC-MCNC: 20 MG/DL (ref 6–23)
WBC, POC: 7.4 K/UL

## 2025-03-25 NOTE — TELEPHONE ENCOUNTER
T.ROHINI. from pt stating she is currently having tightness in chest, states pain started on Sunday. Nurse advise pt if she is having active CP she needs to goto the Er to be checked. Pt said ok.

## 2025-04-10 DIAGNOSIS — G43.919 INTRACTABLE MIGRAINE WITHOUT STATUS MIGRAINOSUS, UNSPECIFIED MIGRAINE TYPE: ICD-10-CM

## 2025-04-11 RX ORDER — RIMEGEPANT SULFATE 75 MG/75MG
75 TABLET, ORALLY DISINTEGRATING ORAL AS NEEDED
Qty: 10 TABLET | Refills: 5 | Status: SHIPPED | OUTPATIENT
Start: 2025-04-11

## 2025-04-21 DIAGNOSIS — N39.0 RECURRENT UTI: Primary | ICD-10-CM

## 2025-04-21 DIAGNOSIS — N39.0 RECURRENT UTI: ICD-10-CM

## 2025-04-21 LAB
APPEARANCE UR: CLEAR
BACTERIA URNS QL MICRO: ABNORMAL /HPF
BILIRUB UR QL: NEGATIVE
COLOR UR: ABNORMAL
EPI CELLS #/AREA URNS HPF: ABNORMAL /HPF (ref 0–5)
GLUCOSE UR STRIP.AUTO-MCNC: NEGATIVE MG/DL
HGB UR QL STRIP: ABNORMAL
HYALINE CASTS URNS QL MICRO: ABNORMAL /LPF
KETONES UR QL STRIP.AUTO: NEGATIVE MG/DL
LEUKOCYTE ESTERASE UR QL STRIP.AUTO: NEGATIVE
NITRITE UR QL STRIP.AUTO: NEGATIVE
PH UR STRIP: 7 (ref 5–9)
PROT UR STRIP-MCNC: NEGATIVE MG/DL
RBC #/AREA URNS HPF: ABNORMAL /HPF (ref 0–5)
SP GR UR REFRACTOMETRY: 1.01 (ref 1–1.02)
UROBILINOGEN UR QL STRIP.AUTO: 0.2 EU/DL (ref 0.2–1)
WBC URNS QL MICRO: ABNORMAL /HPF (ref 0–4)

## 2025-04-22 ENCOUNTER — RESULTS FOLLOW-UP (OUTPATIENT)
Dept: UROLOGY | Age: 40
End: 2025-04-22

## 2025-04-22 RX ORDER — FLUCONAZOLE 150 MG/1
150 TABLET ORAL
Qty: 2 TABLET | Refills: 0 | Status: SHIPPED | OUTPATIENT
Start: 2025-04-22

## 2025-04-22 RX ORDER — NITROFURANTOIN 25; 75 MG/1; MG/1
100 CAPSULE ORAL 2 TIMES DAILY
Qty: 10 CAPSULE | Refills: 0 | Status: SHIPPED | OUTPATIENT
Start: 2025-04-22 | End: 2025-04-27

## 2025-04-23 ENCOUNTER — PATIENT MESSAGE (OUTPATIENT)
Dept: NEUROLOGY | Age: 40
End: 2025-04-23

## 2025-04-23 LAB
BACTERIA SPEC CULT: NORMAL
SERVICE CMNT-IMP: NORMAL

## 2025-04-25 RX ORDER — METHYLPREDNISOLONE 4 MG/1
TABLET ORAL
Qty: 1 KIT | Refills: 0 | Status: SHIPPED | OUTPATIENT
Start: 2025-04-25 | End: 2025-04-25 | Stop reason: CLARIF

## 2025-05-01 ENCOUNTER — OFFICE VISIT (OUTPATIENT)
Dept: FAMILY MEDICINE CLINIC | Facility: CLINIC | Age: 40
End: 2025-05-01
Payer: MEDICARE

## 2025-05-01 VITALS
DIASTOLIC BLOOD PRESSURE: 80 MMHG | SYSTOLIC BLOOD PRESSURE: 138 MMHG | WEIGHT: 225 LBS | BODY MASS INDEX: 33.33 KG/M2 | HEART RATE: 73 BPM | HEIGHT: 69 IN | OXYGEN SATURATION: 98 %

## 2025-05-01 DIAGNOSIS — E55.9 VITAMIN D DEFICIENCY, UNSPECIFIED: ICD-10-CM

## 2025-05-01 DIAGNOSIS — R79.89 LOW VITAMIN D LEVEL: ICD-10-CM

## 2025-05-01 DIAGNOSIS — Z00.00 ROUTINE GENERAL MEDICAL EXAMINATION AT A HEALTH CARE FACILITY: Primary | ICD-10-CM

## 2025-05-01 DIAGNOSIS — Z00.00 MEDICARE ANNUAL WELLNESS VISIT, SUBSEQUENT: ICD-10-CM

## 2025-05-01 DIAGNOSIS — Z00.00 ENCOUNTER FOR WELL ADULT EXAM WITHOUT ABNORMAL FINDINGS: ICD-10-CM

## 2025-05-01 DIAGNOSIS — E66.09 CLASS 1 OBESITY DUE TO EXCESS CALORIES WITH SERIOUS COMORBIDITY AND BODY MASS INDEX (BMI) OF 33.0 TO 33.9 IN ADULT: ICD-10-CM

## 2025-05-01 DIAGNOSIS — Z12.31 OTHER SCREENING MAMMOGRAM: ICD-10-CM

## 2025-05-01 DIAGNOSIS — I10 HYPERTENSION, UNSPECIFIED TYPE: ICD-10-CM

## 2025-05-01 DIAGNOSIS — G43.919 INTRACTABLE MIGRAINE WITHOUT STATUS MIGRAINOSUS, UNSPECIFIED MIGRAINE TYPE: ICD-10-CM

## 2025-05-01 DIAGNOSIS — L91.8 SKIN TAG: ICD-10-CM

## 2025-05-01 DIAGNOSIS — Z13.31 SCREENING FOR DEPRESSION: ICD-10-CM

## 2025-05-01 DIAGNOSIS — E66.811 CLASS 1 OBESITY DUE TO EXCESS CALORIES WITH SERIOUS COMORBIDITY AND BODY MASS INDEX (BMI) OF 33.0 TO 33.9 IN ADULT: ICD-10-CM

## 2025-05-01 DIAGNOSIS — Z00.00 INITIAL MEDICARE ANNUAL WELLNESS VISIT: ICD-10-CM

## 2025-05-01 PROCEDURE — 11200 RMVL SKIN TAGS UP TO&INC 15: CPT | Performed by: FAMILY MEDICINE

## 2025-05-01 PROCEDURE — 3079F DIAST BP 80-89 MM HG: CPT | Performed by: FAMILY MEDICINE

## 2025-05-01 PROCEDURE — 3075F SYST BP GE 130 - 139MM HG: CPT | Performed by: FAMILY MEDICINE

## 2025-05-01 PROCEDURE — G0438 PPPS, INITIAL VISIT: HCPCS | Performed by: FAMILY MEDICINE

## 2025-05-01 RX ORDER — AMLODIPINE BESYLATE 10 MG/1
10 TABLET ORAL DAILY
Qty: 90 TABLET | Refills: 3 | Status: SHIPPED | OUTPATIENT
Start: 2025-05-01

## 2025-05-01 RX ORDER — POTASSIUM CHLORIDE 1500 MG/1
20 TABLET, EXTENDED RELEASE ORAL 2 TIMES DAILY
Qty: 180 TABLET | Refills: 3 | Status: SHIPPED | OUTPATIENT
Start: 2025-05-01

## 2025-05-01 RX ORDER — ERGOCALCIFEROL 1.25 MG/1
50000 CAPSULE, LIQUID FILLED ORAL WEEKLY
Qty: 4 CAPSULE | Refills: 5 | Status: SHIPPED | OUTPATIENT
Start: 2025-05-01

## 2025-05-01 RX ORDER — RIMEGEPANT SULFATE 75 MG/75MG
75 TABLET, ORALLY DISINTEGRATING ORAL AS NEEDED
Qty: 10 TABLET | Refills: 5 | Status: SHIPPED | OUTPATIENT
Start: 2025-05-01

## 2025-05-01 SDOH — ECONOMIC STABILITY: FOOD INSECURITY: WITHIN THE PAST 12 MONTHS, YOU WORRIED THAT YOUR FOOD WOULD RUN OUT BEFORE YOU GOT MONEY TO BUY MORE.: NEVER TRUE

## 2025-05-01 SDOH — ECONOMIC STABILITY: FOOD INSECURITY: WITHIN THE PAST 12 MONTHS, THE FOOD YOU BOUGHT JUST DIDN'T LAST AND YOU DIDN'T HAVE MONEY TO GET MORE.: NEVER TRUE

## 2025-05-01 ASSESSMENT — PATIENT HEALTH QUESTIONNAIRE - PHQ9
SUM OF ALL RESPONSES TO PHQ QUESTIONS 1-9: 0
SUM OF ALL RESPONSES TO PHQ QUESTIONS 1-9: 0
2. FEELING DOWN, DEPRESSED OR HOPELESS: NOT AT ALL
1. LITTLE INTEREST OR PLEASURE IN DOING THINGS: NOT AT ALL
SUM OF ALL RESPONSES TO PHQ QUESTIONS 1-9: 0
SUM OF ALL RESPONSES TO PHQ QUESTIONS 1-9: 0

## 2025-05-01 NOTE — PROGRESS NOTES
PROGRESS NOTE    SUBJECTIVE:   Sri Guy is a 39 y.o. female seen for a follow up visit regarding the following chief complaint:     Chief Complaint   Patient presents with    Annual Exam           HPI      Past Medical History, Past Surgical History, Family history, Social History, and Medications were all reviewed with the patient today and updated as necessary.       Current Outpatient Medications   Medication Sig Dispense Refill    potassium chloride (KLOR-CON M) 20 MEQ extended release tablet Take 1 tablet by mouth 2 times daily 180 tablet 3    amLODIPine (NORVASC) 10 MG tablet Take 1 tablet by mouth daily 90 tablet 3    rimegepant sulfate (NURTEC) 75 MG TBDP Take 75 mg by mouth as needed (1 as needed for migraine headache, may repeat dose in 2 hours. max dose 2 tabs) 10 tablet 5    HYDROcodone-acetaminophen (NORCO)  MG per tablet Take 1 tablet by mouth every 6 hours as needed.      spironolactone (ALDACTONE) 50 MG tablet Take 1 tablet by mouth daily      fluticasone (FLONASE) 50 MCG/ACT nasal spray 48      linaclotide (LINZESS) 290 MCG CAPS capsule 180 capsules      URIBEL 81.6 MG TABS Take 1 tablet by mouth 4 times daily 90 tablet 3    EPINEPHrine (EPIPEN) 0.3 MG/0.3ML SOAJ injection INJECT 0.3 MLS INTO THE MUSCLE ONCE AS NEEDED (AS NEEDED) 0.3 mL 0    hydroCHLOROthiazide 12.5 MG capsule Take 1 capsule by mouth every morning 90 capsule 1    metoprolol succinate (TOPROL XL) 25 MG extended release tablet Take 1 tablet by mouth daily 90 tablet 3     No current facility-administered medications for this visit.     Allergies   Allergen Reactions    Shellfish Allergy Anaphylaxis and Hives    Shrimp Extract Hives     facial hives and throat closing sensation     Patient Active Problem List   Diagnosis    Dyspareunia due to medical condition in female patient    Chronic constipation    Varicella    Chronic migraine with aura without status migrainosus, not intractable    Chronic fatigue    Palpitations

## 2025-05-01 NOTE — PATIENT INSTRUCTIONS
who doesn't know you.  It may help to think of an advance directive as a gift to the people who care for you. If you have one, they won't have to make tough decisions by themselves.  For more information, including forms for your state, see the [employer] website (www.caringinfo.org/planning/advance-directives/).  Follow-up care is a key part of your treatment and safety. Be sure to make and go to all appointments, and call your doctor if you are having problems. It's also a good idea to know your test results and keep a list of the medicines you take.  What should you include in an advance directive?  Many states have a unique advance directive form. (It may ask you to address specific issues.) Or you might use a universal form that's approved by many states.  If your form doesn't tell you what to address, it may be hard to know what to include in your advance directive. Use the questions below to help you get started.  Who do you want to make decisions about your medical care if you are not able to?  What life-support measures do you want if you have a serious illness that gets worse over time or can't be cured?  What are you most afraid of that might happen? (Maybe you're afraid of having pain, losing your independence, or being kept alive by machines.)  Where would you prefer to die? (Your home? A hospital? A nursing home?)  Do you want to donate your organs when you die?  Do you want certain Denominational practices performed before you die?  When should you call for help?  Be sure to contact your doctor if you have any questions.  Where can you learn more?  Go to https://www.[employer].net/patientEd and enter R264 to learn more about \"Advance Directives: Care Instructions.\"  Current as of: March 1, 2024  Content Version: 14.4  © 8182-5004 [employer].   Care instructions adapted under license by [email or web address]. If you have questions about a medical condition or this instruction, always ask your

## 2025-05-02 DIAGNOSIS — E66.811 CLASS 1 OBESITY DUE TO EXCESS CALORIES WITH SERIOUS COMORBIDITY AND BODY MASS INDEX (BMI) OF 33.0 TO 33.9 IN ADULT: Primary | ICD-10-CM

## 2025-05-02 DIAGNOSIS — E66.09 CLASS 1 OBESITY DUE TO EXCESS CALORIES WITH SERIOUS COMORBIDITY AND BODY MASS INDEX (BMI) OF 33.0 TO 33.9 IN ADULT: Primary | ICD-10-CM

## 2025-05-08 ENCOUNTER — TELEPHONE (OUTPATIENT)
Dept: FAMILY MEDICINE CLINIC | Facility: CLINIC | Age: 40
End: 2025-05-08

## 2025-05-13 ENCOUNTER — OFFICE VISIT (OUTPATIENT)
Dept: FAMILY MEDICINE CLINIC | Facility: CLINIC | Age: 40
End: 2025-05-13

## 2025-05-13 VITALS
OXYGEN SATURATION: 96 % | HEIGHT: 69 IN | DIASTOLIC BLOOD PRESSURE: 100 MMHG | BODY MASS INDEX: 33.47 KG/M2 | WEIGHT: 226 LBS | SYSTOLIC BLOOD PRESSURE: 160 MMHG

## 2025-05-13 DIAGNOSIS — I10 HYPERTENSION, UNSPECIFIED TYPE: ICD-10-CM

## 2025-05-13 DIAGNOSIS — G43.919 INTRACTABLE MIGRAINE WITHOUT STATUS MIGRAINOSUS, UNSPECIFIED MIGRAINE TYPE: ICD-10-CM

## 2025-05-13 DIAGNOSIS — G43.919 INTRACTABLE MIGRAINE WITHOUT STATUS MIGRAINOSUS, UNSPECIFIED MIGRAINE TYPE: Primary | ICD-10-CM

## 2025-05-13 RX ORDER — KETOROLAC TROMETHAMINE 30 MG/ML
60 INJECTION, SOLUTION INTRAMUSCULAR; INTRAVENOUS ONCE
Status: COMPLETED | OUTPATIENT
Start: 2025-05-13 | End: 2025-05-13

## 2025-05-13 RX ORDER — UBROGEPANT 100 MG/1
100 TABLET ORAL PRN
Qty: 10 TABLET | Refills: 11 | Status: SHIPPED | OUTPATIENT
Start: 2025-05-13

## 2025-05-13 RX ADMIN — KETOROLAC TROMETHAMINE 60 MG: 30 INJECTION, SOLUTION INTRAMUSCULAR; INTRAVENOUS at 15:59

## 2025-05-13 ASSESSMENT — ENCOUNTER SYMPTOMS
SINUS PAIN: 0
RHINORRHEA: 0
NAUSEA: 1
ABDOMINAL PAIN: 0
DIARRHEA: 0
COUGH: 0
SHORTNESS OF BREATH: 0

## 2025-05-13 NOTE — PROGRESS NOTES
PROGRESS NOTE    SUBJECTIVE:   Sri Guy is a 39 y.o. female seen for a follow up visit regarding the following chief complaint:     Chief Complaint   Patient presents with    Migraine     Headache for the past 2 days            HPI patient presents the office today after starting a migraine trying her medication that she has prescribed by neurology did not get better call neurology and they told her to come see your doctor presents the office today with elevated blood pressure headache frontal right side typical of her migraine      Past Medical History, Past Surgical History, Family history, Social History, and Medications were all reviewed with the patient today and updated as necessary.       Current Outpatient Medications   Medication Sig Dispense Refill    Ubrogepant (UBRELVY) 100 MG TABS Take 100 mg by mouth as needed (for migraine headache, may repeat dose in 2 hours as needed.  Max 200mg/ day) 10 tablet 11    Tirzepatide (MOUNJARO) 2.5 MG/0.5ML SOAJ pen Inject 2.5 mg into the skin every 7 days for 28 days 2 mL 5    potassium chloride (KLOR-CON M) 20 MEQ extended release tablet Take 1 tablet by mouth 2 times daily 180 tablet 3    amLODIPine (NORVASC) 10 MG tablet Take 1 tablet by mouth daily 90 tablet 3    rimegepant sulfate (NURTEC) 75 MG TBDP Take 75 mg by mouth as needed (1 as needed for migraine headache, may repeat dose in 2 hours. max dose 2 tabs) 10 tablet 5    HYDROcodone-acetaminophen (NORCO)  MG per tablet Take 1 tablet by mouth every 6 hours as needed.      spironolactone (ALDACTONE) 50 MG tablet Take 1 tablet by mouth daily      fluticasone (FLONASE) 50 MCG/ACT nasal spray 48      linaclotide (LINZESS) 290 MCG CAPS capsule 180 capsules      EPINEPHrine (EPIPEN) 0.3 MG/0.3ML SOAJ injection INJECT 0.3 MLS INTO THE MUSCLE ONCE AS NEEDED (AS NEEDED) 0.3 mL 0    hydroCHLOROthiazide 12.5 MG capsule Take 1 capsule by mouth every morning 90 capsule 1    metoprolol succinate (TOPROL XL) 25

## 2025-05-14 DIAGNOSIS — G43.919 INTRACTABLE MIGRAINE WITHOUT STATUS MIGRAINOSUS, UNSPECIFIED MIGRAINE TYPE: ICD-10-CM

## 2025-05-14 RX ORDER — RIMEGEPANT SULFATE 75 MG/75MG
TABLET, ORALLY DISINTEGRATING ORAL
Refills: 0 | OUTPATIENT
Start: 2025-05-14

## 2025-05-16 ENCOUNTER — TELEPHONE (OUTPATIENT)
Dept: FAMILY MEDICINE CLINIC | Facility: CLINIC | Age: 40
End: 2025-05-16

## 2025-05-20 ENCOUNTER — OFFICE VISIT (OUTPATIENT)
Dept: NEUROLOGY | Age: 40
End: 2025-05-20
Payer: MEDICARE

## 2025-05-20 VITALS
HEART RATE: 77 BPM | WEIGHT: 220 LBS | DIASTOLIC BLOOD PRESSURE: 98 MMHG | SYSTOLIC BLOOD PRESSURE: 170 MMHG | BODY MASS INDEX: 32.49 KG/M2 | OXYGEN SATURATION: 97 %

## 2025-05-20 DIAGNOSIS — G43.E09 CHRONIC MIGRAINE WITH AURA WITHOUT STATUS MIGRAINOSUS, NOT INTRACTABLE: Primary | ICD-10-CM

## 2025-05-20 PROCEDURE — 99214 OFFICE O/P EST MOD 30 MIN: CPT | Performed by: NURSE PRACTITIONER

## 2025-05-20 PROCEDURE — 3077F SYST BP >= 140 MM HG: CPT | Performed by: NURSE PRACTITIONER

## 2025-05-20 PROCEDURE — 3080F DIAST BP >= 90 MM HG: CPT | Performed by: NURSE PRACTITIONER

## 2025-05-20 PROCEDURE — 1036F TOBACCO NON-USER: CPT | Performed by: NURSE PRACTITIONER

## 2025-05-20 PROCEDURE — G8417 CALC BMI ABV UP PARAM F/U: HCPCS | Performed by: NURSE PRACTITIONER

## 2025-05-20 PROCEDURE — G8427 DOCREV CUR MEDS BY ELIG CLIN: HCPCS | Performed by: NURSE PRACTITIONER

## 2025-05-20 RX ORDER — RIMEGEPANT SULFATE 75 MG/75MG
1 TABLET, ORALLY DISINTEGRATING ORAL EVERY OTHER DAY
Qty: 16 TABLET | Refills: 2 | Status: SHIPPED | OUTPATIENT
Start: 2025-05-20 | End: 2025-06-19

## 2025-05-20 RX ORDER — UBROGEPANT 100 MG/1
100 TABLET ORAL PRN
Qty: 16 TABLET | Refills: 3 | Status: SHIPPED | OUTPATIENT
Start: 2025-05-20

## 2025-05-20 NOTE — PROGRESS NOTES
has discontinued Qulipta due to adverse effects and has been using Nurtec and Ubrelvy as needed. She has also stopped taking Excedrin completely. Her headache frequency remains at 6 to 7 per month, and she has experienced increased stress due to her father's recent diagnosis of stage IV lung cancer.               HPI:      Headaches are located bi-parietal and vertex of head or left retro-orbital or occipital radiating to neck.  They began ~teenage years. Severity is in the last 3-6 months have increased in intensity, approx moderate to severe. Quality of headaches are described as throbbings.  Associated symptoms: photophobia, phonophobia, nausea, no vomiting, loss of appetite, neck pain, dizziness. Occasional osmophobia, facial numbness and hand numbness, diplopia, blurred vision, visual floaters.  The headaches are exacerbated by have not been able to identify any triggers. Factors that relieve the headaches are ubrevely 100mg with Excedrin 2 hours later. Previous Imaging Yes, MRI Brain normal. Migraines intensify within 1 hour.       Has had kidney stones in the past, takes uribel, taking this IC. Has a hysterectomy.       Has had a history of a PE 1 week PP in 2015      Exercises 3x per week with .    P O U N D    HEADACHE     4 / 5.     P ulsatile :  yes    O nset :  yes      Unilateral :  no        N ausea / vomit :  yes         D ebility :  yes  Positive = 3 or more.    For migraine type headaches.       MIGRAINE  PHOBIAS :   5 / 5.                 PHOTO :  yes                  PHONO :  yes                   OSMO :  yes                     TACTILE :  yes                        VESTIBULO :  yes        MEDICATION TRIALS:         Prophylactics/Preventives::    beta blockers, amitriptyline (could not fully empty), avoid topamax due to history of kidney stones., SSRI (lexapro, visual hallucinations), avoid blood pressure medications due to concomment use of blood pressure meds. History of constipation, 
Colitis Sister     Crohn's Disease Sister     Crohn's Disease Maternal Grandmother     Kidney Cancer Maternal Grandmother     Hypertension Maternal Grandfather     Lung Cancer Maternal Grandfather     Pancreatic Cancer Maternal Grandfather     Breast Cancer Maternal Aunt        Vital Signs:  Vitals:    05/20/25 1337   BP: (!) 170/98   Pulse: 77   SpO2: 97%   Weight: 99.8 kg (220 lb)      Body mass index is 32.49 kg/m².       Physical Exam:  General:  No acute distress.  Head: atraumatic. normocephalic.  Respiratory: non-labored respirations, without cough/audible wheeze  Neurological:  Mental Status: Alert and oriented x 3    Cranial Nerves:   I- deferred  II, III, IV, VI-Pupils equal, round and reactive to light.  Extraocular movements intact.  Visual fields full.   V-Facial sensation intact to light touch V1-V3  VII-Face symmetric  VIII-Hearing intact to finger rub  IX-deferred  X, XII-Speech clear and fluent. Tongue midline  XI-Shoulder shrug strong and symmetric    Sensation: intact to light touch in bilateral upper and mild decrease to vibration bilateral lower extremities  Strength: normal tone.  5/5 strength in bilateral deltoids, biceps, triceps, interossei, hip flexors, knee extensors, knee flexors. Without tremor.   Reflexes: deep tendon reflexes- 2+ bilateral biceps, brachioradialis, triceps, patellar   Gait: casual,  steady      Assessment/ Plan:    {There are no diagnoses linked to this encounter. (Refresh or delete this SmartLink)}         Headache Education:     To avoid a pain medication overuse headache trying not to take pain medicines more than 2-3 doses a week. To help relieve headache symptoms without taking pain medicine lie down in a darkroom and drink glass of water.  Consider lifestyle modification including good sleep hygiene, routine medial schedules, regular exercise and managing triggers.  Keep a headache diary  to reveal triggers and possible patterns. Migraine lidia is a great resource

## 2025-05-20 NOTE — ASSESSMENT & PLAN NOTE
Patient reports drowsiness secondary to taking Nurtec but she does take this at night and symptoms dissipate by the morning.  Qulipta was ineffective, we will discontinue.  Will replace with Nurtec every other day for prevention.  She should track migraine frequency and we can reassess when I see her next.    Ubrelvy is effective.  Will utilize Ubrelvy for rescue.

## 2025-06-18 ENCOUNTER — OFFICE VISIT (OUTPATIENT)
Dept: UROLOGY | Age: 40
End: 2025-06-18
Payer: MEDICARE

## 2025-06-18 DIAGNOSIS — N39.0 RECURRENT UTI: ICD-10-CM

## 2025-06-18 DIAGNOSIS — R31.29 MICROHEMATURIA: ICD-10-CM

## 2025-06-18 DIAGNOSIS — N30.10 INTERSTITIAL CYSTITIS: Primary | ICD-10-CM

## 2025-06-18 DIAGNOSIS — R39.89 BLADDER PAIN: ICD-10-CM

## 2025-06-18 LAB
BILIRUBIN, URINE, POC: NEGATIVE
BLOOD URINE, POC: NORMAL
GLUCOSE URINE, POC: NEGATIVE MG/DL
KETONES, URINE, POC: NEGATIVE MG/DL
LEUKOCYTE ESTERASE, URINE, POC: NEGATIVE
NITRITE, URINE, POC: NEGATIVE
PH, URINE, POC: 6 (ref 4.6–8)
PROTEIN,URINE, POC: 30 MG/DL
SPECIFIC GRAVITY, URINE, POC: 1.02 (ref 1–1.03)
URINALYSIS CLARITY, POC: NORMAL
URINALYSIS COLOR, POC: NORMAL
UROBILINOGEN, POC: NORMAL MG/DL

## 2025-06-18 PROCEDURE — 99214 OFFICE O/P EST MOD 30 MIN: CPT | Performed by: NURSE PRACTITIONER

## 2025-06-18 PROCEDURE — G8427 DOCREV CUR MEDS BY ELIG CLIN: HCPCS | Performed by: NURSE PRACTITIONER

## 2025-06-18 PROCEDURE — 81003 URINALYSIS AUTO W/O SCOPE: CPT | Performed by: NURSE PRACTITIONER

## 2025-06-18 PROCEDURE — 1036F TOBACCO NON-USER: CPT | Performed by: NURSE PRACTITIONER

## 2025-06-18 PROCEDURE — G8417 CALC BMI ABV UP PARAM F/U: HCPCS | Performed by: NURSE PRACTITIONER

## 2025-06-18 RX ORDER — METHENAMINE, BENZOIC ACID, PHENYL SALICYLATE, METHYLENE BLUE, AND HYOSCYAMINE SULFATE 9; .12; 81.6; 10.8; 36.2 MG/1; MG/1; MG/1; MG/1; MG/1
81.6 TABLET, COATED ORAL 4 TIMES DAILY
Qty: 90 TABLET | Refills: 3 | Status: SHIPPED | OUTPATIENT
Start: 2025-06-18

## 2025-06-18 ASSESSMENT — ENCOUNTER SYMPTOMS
BACK PAIN: 0
NAUSEA: 0

## 2025-06-18 NOTE — PROGRESS NOTES
[facility]  [address] [address]  [phone]          Sri Guy  : 1985    Chief Complaint   Patient presents with    Follow-up          HPI     Sri Guy is a 39 y.o. female previously followed by Dr Ling. Prior to this, she was followed by EVARISTO Blanchard NP and [facility]. H/O IC, recurrent UTI, and renal stones.     H/O partial hysterectomy.  Has some degenerative disease of the spine. She also has irritable bowels been on Linzess.  She previously tried Elmiron and Myrbetriq wo change in LUTS. She had a CT scan in 2017 did not show any  pathology.       S/P cystoscopy, hydrodistention of the bladder and urethral dilatation (). This helped her sx mildly.      She has previously had some relief w instills of the bladder. Uribel has been most helpful. Her biggest dietary triggers include spaghetti and spicy chicken.     She was seen for bladder pain and gross hematuria . ?IC flare. Sx cont to worsen. Seen back in office . CT a/p hematuria protocol showed NO etiology for hematuria. NO renal stones. Underwent cysto/hydrodistention on 21 which revealed IC. Urine cytology also sent which was negative for malignant cells.     MRI abd 24 showed normal kidneys     sCr (24) 0.96          Past Medical History:   Diagnosis Date    Acute dysfunction of both eustachian tubes     Anemia     Bladder pain     Chronic lower back pain     Essential hypertension     History of kidney stones     Hypokalemia     Interstitial cystitis     Irritable bowel syndrome     Meniere's disease     Paresthesia and pain of right extremity     Pulmonary embolus (HCC) 2015    SVT (supraventricular tachycardia)     Varicella      Past Surgical History:   Procedure Laterality Date    ABLATION OF DYSRHYTHMIC FOCUS  02/15/2016    ABLATION OF DYSRHYTHMIC FOCUS  2017     SECTION      X3    COLONOSCOPY N/A 2017    COLONOSCOPY

## 2025-07-22 RX ORDER — HYDROCHLOROTHIAZIDE 12.5 MG/1
12.5 CAPSULE ORAL EVERY MORNING
Qty: 30 CAPSULE | Refills: 0 | Status: SHIPPED | OUTPATIENT
Start: 2025-07-22

## 2025-07-22 NOTE — TELEPHONE ENCOUNTER
MEDICATION REFILL REQUEST      Name of Medication:  hydroCHLOROthiazide 12.5 MG capsule   Dose:    Frequency:    Quantity:    Days' supply:  30    Pharmacy Name/Location:      [facility] / pharmacy # [id number], [place]

## 2025-08-07 ENCOUNTER — OFFICE VISIT (OUTPATIENT)
Dept: FAMILY MEDICINE CLINIC | Facility: CLINIC | Age: 40
End: 2025-08-07
Payer: MEDICARE

## 2025-08-07 VITALS
DIASTOLIC BLOOD PRESSURE: 80 MMHG | HEIGHT: 69 IN | BODY MASS INDEX: 32.58 KG/M2 | WEIGHT: 220 LBS | SYSTOLIC BLOOD PRESSURE: 140 MMHG

## 2025-08-07 DIAGNOSIS — N18.32 CHRONIC KIDNEY DISEASE, STAGE 3B (HCC): ICD-10-CM

## 2025-08-07 DIAGNOSIS — J01.11 ACUTE RECURRENT FRONTAL SINUSITIS: ICD-10-CM

## 2025-08-07 DIAGNOSIS — B37.31 VAGINAL CANDIDIASIS: ICD-10-CM

## 2025-08-07 DIAGNOSIS — R68.83 CHILLS: Primary | ICD-10-CM

## 2025-08-07 LAB
EXP DATE SOLUTION: NORMAL
EXP DATE SWAB: NORMAL
EXPIRATION DATE: NORMAL
GRANS ABSOLUTE, POC: 3.2 K/UL
GRANULOCYTES %, POC: 46.4 %
GROUP A STREP ANTIGEN, POC: NEGATIVE
HEMATOCRIT, POC: 37.6 %
HEMOGLOBIN, POC: 12.5 G/DL
INFLUENZA A ANTIGEN, POC: NEGATIVE
INFLUENZA B ANTIGEN, POC: NEGATIVE
LOT NUMBER POC: NORMAL
LOT NUMBER SOLUTION: NORMAL
LOT NUMBER SWAB: NORMAL
LYMPHOCYTE %, POC: 43 %
LYMPHS ABSOLUTE, POC: 3 K/UL
MCH, POC: NORMAL PG (ref 40–?)
MCHC, POC: 33.2
MCV, POC: 89.2
MONOCYTE %, POC: 10.6 %
MONOCYTE, ABSOLUTE POC: 0.7 K/UL
MPV, POC: 7.2 FL
PLATELET COUNT, POC: 271 K/UL
RBC, POC: 4.21 M/UL
RDW, POC: 11.8 %
SARS-COV-2 RNA, POC: NEGATIVE
VALID INTERNAL CONTROL, POC: YES
VALID INTERNAL CONTROL, POC: YES
WBC, POC: 6.9 K/UL

## 2025-08-07 PROCEDURE — 1036F TOBACCO NON-USER: CPT | Performed by: FAMILY MEDICINE

## 2025-08-07 PROCEDURE — 87635 SARS-COV-2 COVID-19 AMP PRB: CPT | Performed by: FAMILY MEDICINE

## 2025-08-07 PROCEDURE — G8417 CALC BMI ABV UP PARAM F/U: HCPCS | Performed by: FAMILY MEDICINE

## 2025-08-07 PROCEDURE — 36415 COLL VENOUS BLD VENIPUNCTURE: CPT | Performed by: FAMILY MEDICINE

## 2025-08-07 PROCEDURE — 3077F SYST BP >= 140 MM HG: CPT | Performed by: FAMILY MEDICINE

## 2025-08-07 PROCEDURE — G8427 DOCREV CUR MEDS BY ELIG CLIN: HCPCS | Performed by: FAMILY MEDICINE

## 2025-08-07 PROCEDURE — 96372 THER/PROPH/DIAG INJ SC/IM: CPT | Performed by: FAMILY MEDICINE

## 2025-08-07 PROCEDURE — 99213 OFFICE O/P EST LOW 20 MIN: CPT | Performed by: FAMILY MEDICINE

## 2025-08-07 PROCEDURE — 85025 COMPLETE CBC W/AUTO DIFF WBC: CPT | Performed by: FAMILY MEDICINE

## 2025-08-07 PROCEDURE — 87804 INFLUENZA ASSAY W/OPTIC: CPT | Performed by: FAMILY MEDICINE

## 2025-08-07 PROCEDURE — 3079F DIAST BP 80-89 MM HG: CPT | Performed by: FAMILY MEDICINE

## 2025-08-07 PROCEDURE — 87880 STREP A ASSAY W/OPTIC: CPT | Performed by: FAMILY MEDICINE

## 2025-08-07 RX ORDER — FLUCONAZOLE 150 MG/1
150 TABLET ORAL
Qty: 2 TABLET | Refills: 0 | Status: SHIPPED | OUTPATIENT
Start: 2025-08-07 | End: 2025-08-13

## 2025-08-07 ASSESSMENT — ENCOUNTER SYMPTOMS
SINUS PAIN: 1
VOICE CHANGE: 0
SINUS PRESSURE: 1
WHEEZING: 0
NAUSEA: 0
VOMITING: 0
RHINORRHEA: 1
COUGH: 1
SORE THROAT: 0

## 2025-08-28 ENCOUNTER — OFFICE VISIT (OUTPATIENT)
Age: 40
End: 2025-08-28
Payer: MEDICARE

## 2025-08-28 VITALS
SYSTOLIC BLOOD PRESSURE: 180 MMHG | BODY MASS INDEX: 33.18 KG/M2 | HEART RATE: 78 BPM | HEIGHT: 69 IN | DIASTOLIC BLOOD PRESSURE: 112 MMHG | WEIGHT: 224 LBS

## 2025-08-28 DIAGNOSIS — I10 ESSENTIAL HYPERTENSION: ICD-10-CM

## 2025-08-28 DIAGNOSIS — R06.02 SHORTNESS OF BREATH: ICD-10-CM

## 2025-08-28 DIAGNOSIS — I47.10 SVT (SUPRAVENTRICULAR TACHYCARDIA): Primary | ICD-10-CM

## 2025-08-28 PROCEDURE — 1036F TOBACCO NON-USER: CPT | Performed by: INTERNAL MEDICINE

## 2025-08-28 PROCEDURE — 3080F DIAST BP >= 90 MM HG: CPT | Performed by: INTERNAL MEDICINE

## 2025-08-28 PROCEDURE — 99214 OFFICE O/P EST MOD 30 MIN: CPT | Performed by: INTERNAL MEDICINE

## 2025-08-28 PROCEDURE — G8417 CALC BMI ABV UP PARAM F/U: HCPCS | Performed by: INTERNAL MEDICINE

## 2025-08-28 PROCEDURE — 3077F SYST BP >= 140 MM HG: CPT | Performed by: INTERNAL MEDICINE

## 2025-08-28 PROCEDURE — G8428 CUR MEDS NOT DOCUMENT: HCPCS | Performed by: INTERNAL MEDICINE

## 2025-08-28 PROCEDURE — 93000 ELECTROCARDIOGRAM COMPLETE: CPT | Performed by: INTERNAL MEDICINE

## 2025-08-28 RX ORDER — FLECAINIDE ACETATE 100 MG/1
100 TABLET ORAL 2 TIMES DAILY
Qty: 180 TABLET | Refills: 3 | Status: SHIPPED | OUTPATIENT
Start: 2025-08-28

## (undated) DEVICE — TRAY PREP DRY W/ PREM GLV 2 APPL 6 SPNG 2 UNDPD 1 OVERWRAP

## (undated) DEVICE — CONNECTOR TBNG OD5-7MM O2 END DISP

## (undated) DEVICE — CANNULA NSL ORAL AD FOR CAPNOFLEX CO2 O2 AIRLFE

## (undated) DEVICE — SOLUTION IRRIG 3000ML H2O STRL BAG

## (undated) DEVICE — KENDALL RADIOLUCENT FOAM MONITORING ELECTRODE RECTANGULAR SHAPE: Brand: KENDALL

## (undated) DEVICE — SYR 3ML LL TIP 1/10ML GRAD --

## (undated) DEVICE — CONTAINER PREFIL FRMLN 40ML --

## (undated) DEVICE — CYSTO/BLADDER IRRIGATION SET, REGULATING CLAMP

## (undated) DEVICE — FORCEPS BX L240CM JAW DIA2.8MM L CAP W/ NDL MIC MESH TOOTH

## (undated) DEVICE — FORCEPS BX 240CM JAW 3.2MM L CAP NDL MIC MESH TTH M00513372

## (undated) DEVICE — GARMENT,MEDLINE,DVT,INT,CALF,MED, GEN2: Brand: MEDLINE

## (undated) DEVICE — SYR 5ML 1/5 GRAD LL NSAF LF --

## (undated) DEVICE — NDL PRT INJ NSAF BLNT 18GX1.5 --

## (undated) DEVICE — CYSTO: Brand: MEDLINE INDUSTRIES, INC.

## (undated) DEVICE — KENDALL SCD EXPRESS SLEEVES, KNEE LENGTH, MEDIUM: Brand: KENDALL SCD

## (undated) DEVICE — PACK SURGICAL PROCEDURE KIT CYSTOSCOPY TOTE